# Patient Record
Sex: MALE | Race: WHITE | Employment: OTHER | URBAN - NONMETROPOLITAN AREA
[De-identification: names, ages, dates, MRNs, and addresses within clinical notes are randomized per-mention and may not be internally consistent; named-entity substitution may affect disease eponyms.]

---

## 2017-02-06 ENCOUNTER — HOSPITAL ENCOUNTER (OUTPATIENT)
Dept: NON INVASIVE DIAGNOSTICS | Age: 67
Discharge: HOME | End: 2017-02-06
Payer: MEDICARE

## 2017-02-06 DIAGNOSIS — R07.9 CHEST PAIN, UNSPECIFIED TYPE: Primary | ICD-10-CM

## 2017-02-06 PROCEDURE — 93350 STRESS TTE ONLY: CPT

## 2017-07-25 ENCOUNTER — OFFICE VISIT (OUTPATIENT)
Dept: OTOLARYNGOLOGY | Facility: CLINIC | Age: 67
End: 2017-07-25

## 2017-07-25 VITALS
WEIGHT: 153 LBS | RESPIRATION RATE: 20 BRPM | HEART RATE: 74 BPM | BODY MASS INDEX: 25.49 KG/M2 | HEIGHT: 65 IN | DIASTOLIC BLOOD PRESSURE: 76 MMHG | TEMPERATURE: 98 F | SYSTOLIC BLOOD PRESSURE: 131 MMHG

## 2017-07-25 DIAGNOSIS — R04.0 RIGHT-SIDED EPISTAXIS: Primary | ICD-10-CM

## 2017-07-25 PROCEDURE — 99203 OFFICE O/P NEW LOW 30 MIN: CPT | Performed by: NURSE PRACTITIONER

## 2017-07-25 PROCEDURE — 31231 NASAL ENDOSCOPY DX: CPT | Performed by: NURSE PRACTITIONER

## 2017-07-25 RX ORDER — LOSARTAN POTASSIUM AND HYDROCHLOROTHIAZIDE 12.5; 5 MG/1; MG/1
1 TABLET ORAL DAILY
Refills: 3 | COMMUNITY
Start: 2017-05-09

## 2017-07-25 RX ORDER — GLYCOPYRROLATE 1 MG/1
TABLET ORAL
Refills: 0 | Status: ON HOLD | COMMUNITY
Start: 2017-05-09 | End: 2019-08-29

## 2017-07-25 NOTE — PROGRESS NOTES
PRIMARY CARE PROVIDER: Oneil Awad MD  REFERRING PROVIDER: No ref. provider found    Chief Complaint   Patient presents with   • Nose Bleed       Subjective   History of Present Illness:  Laureano Saunders is a  67 y.o. male who complains of epistaxis. The symptoms are localized to the right nasal cavity. The patient has had severe symptoms. The symptoms have been intermittant for the last 5 days. He states he has had approximately 5-6 episodes of epistaxis per day lasting anywhere from 10 minutes to an hour and a half.  He states the nosebleeds primarily run down the back of his throat.  The symptoms are aggravated by blowing nose, straining, bending over, and high blood pressure. The symptoms are improved by digital pressure.  He has not been taking his Hyzaar because he felt his blood pressure has been well controlled.  He was seen by his primary care doctor today who instructed him to restart his blood pressure medications.  He is also taking aspirin for history of stroke in 2004.  I spoke with JANUSZ Torre at Dr. Awad's office prior to the patient's arrival today.  She states his hemoglobin was 14 several days ago.  She has ordered another CBC today.    Review of Systems:  Review of Systems   Constitutional: Negative for chills and fever.   HENT: Positive for nosebleeds. Negative for congestion, ear discharge, ear pain, sore throat, tinnitus and voice change.    Musculoskeletal: Positive for back pain.   Neurological: Positive for weakness.   All other systems reviewed and are negative.      Past History:  Past Medical History:   Diagnosis Date   • Allergic rhinitis    • Ankylosis    • Michel's esophagus determined by endoscopy    • Bronchitis    • CVA (cerebral vascular accident)    • DDD (degenerative disc disease), lumbar    • GERD (gastroesophageal reflux disease)    • Hypercholesteremia    • Hypertension    • Radiculopathy, lumbar region    • Scalp psoriasis    • URI (upper respiratory  infection)    • Vertigo      Past Surgical History:   Procedure Laterality Date   • COLONOSCOPY     • ENDOSCOPY     • KNEE ARTHROSCOPY      right knee x 2   • NECK SURGERY     • ROTATOR CUFF REPAIR       Family History   Problem Relation Age of Onset   • Heart disease Mother    • Hypertension Mother    • Hypertension Father    • Heart disease Father    • Cancer Father    • Hypertension Sister    • Diabetes Sister    • Hypertension Brother    • Diabetes Paternal Grandmother      Social History   Substance Use Topics   • Smoking status: Former Smoker     Packs/day: 1.00     Years: 15.00     Types: Cigarettes     Quit date: 1994   • Smokeless tobacco: None   • Alcohol use No     Allergies:  Codeine; Influenza vaccines; Lipitor [atorvastatin]; Mycinaire nasal mist [sodium chloride]; and Penicillins    Current Outpatient Prescriptions:   •  aspirin 81 MG chewable tablet, Chew 81 mg Daily., Disp: , Rfl:   •  glycopyrrolate (ROBINUL) 1 MG tablet, TAKE ONE TABLET BY MOUTH TWICE DAILY, Disp: , Rfl: 0  •  losartan-hydrochlorothiazide (HYZAAR) 50-12.5 MG per tablet, TAKE ONE TABLET BY MOUTH DAILY, Disp: , Rfl: 3  •  NIFEdipine CC (ADALAT CC) 90 MG 24 hr tablet, Take 90 mg by mouth Daily., Disp: , Rfl:   •  omeprazole (PriLOSEC) 40 MG capsule, Take 40 mg by mouth Daily., Disp: , Rfl:   •  simvastatin (ZOCOR) 40 MG tablet, Take 40 mg by mouth Every Night., Disp: , Rfl:   •  mupirocin (BACTROBAN) 2 % ointment, Apply  topically 3 (Three) Times a Day for 14 days. Apply intranasally, Disp: 22 g, Rfl: 0      Objective     Vital Signs:  Temp:  [98 °F (36.7 °C)] 98 °F (36.7 °C)  Heart Rate:  [74] 74  Resp:  [20] 20  BP: (131)/(76) 131/76    Physical Exam:  Physical Exam  CONSTITUTIONAL: well nourished, well-developed, alert, oriented, in no acute distress   COMMUNICATION AND VOICE: able to communicate normally, normal voice quality  HEAD: normocephalic, no lesions, atraumatic, no tenderness, no masses   FACE: appearance normal, no  lesions, no tenderness, no deformities, facial motion symmetric  SALIVARY GLANDS: parotid glands with no tenderness, no swelling, no masses, submandibular glands with normal size, nontender  EYES: ocular motility normal, eyelids normal, orbits normal, no proptosis, conjunctiva normal , pupils equal, round   EARS:  Hearing: response to conversational voice normal bilaterally   External Ears: auricles without lesions  Otoscopic: tympanic membrane appearance normal, no lesions, no perforation, normal mobility, no fluid  NOSE:  External Nose: structure normal, no tenderness on palpation, no nasal discharge, no lesions, no evidence of trauma, nostrils patent   Intranasal Exam: nasal mucosa normal, vestibule within normal limits, inferior turbinate normal, nasal septum with mild leftward deviation, no evidence of epistaxis  ORAL:  Lips: upper and lower lips without lesion   Teeth: dentition within normal limits for age   Gums: gingivae healthy   Oral Mucosa: oral mucosa normal, no mucosal lesions   Floor of Mouth: Warthin’s duct patent, mucosa normal  Tongue: lingual mucosa normal without lesions, normal tongue mobility   Palate: soft and hard palates with normal mucosa and structure  Oropharynx: oropharyngeal mucosa normal, tonsils normal in appearance   NECK: neck appearance normal, no masses or tenderness  LYMPH NODES: no lymphadenopathy  CHEST/RESPIRATORY: respiratory effort normal, normal breath sounds   CARDIOVASCULAR: rate and rhythm normal, extremities without cyanosis or edema    NEUROLOGIC/PSYCHIATRIC: oriented to time, place and person, mood normal, affect appropriate, CN II-XII intact grossly      Assessment   Assessment:  1. Right-sided epistaxis        Plan   Plan:    New Medications Ordered This Visit   Medications   • mupirocin (BACTROBAN) 2 % ointment     Sig: Apply  topically 3 (Three) Times a Day for 14 days. Apply intranasally     Dispense:  22 g     Refill:  0     Saline nasal spray or saline gel to  nose three times a day and as needed. Use a bedside humidifier at night. Place Vasoline in nose in the morning and at night.  Use antibiotic ointment in the front of the nose twice a day for 2 weeks. Then, switch to vasoline in the nose twice a day to keep the lining moist.  NOSEBLEED INSTRUCTIONS: Use over the counter antibiotic ointment or Vasoline to anterior nares twice a day. Salt water spray or gel to nose every 2 hours and as needed. Hypertension control is important, keeping systolic pressures less than 140 is possible. If epistaxis present, hold all ASA or NSAIDs. Use Afrin or Neosynephrine spray if epistaxis returns, Hold direct pressure to the fleshy portion of the nose for five minutes. If epistaxis continues, repeat and hold pressure for another five minutes. If bleeding continues, call the office or go to the emergency room for evaluation.   Epistaxis handout given.    Return in about 3 months (around 10/25/2017), or if symptoms worsen or fail to improve, for Recheck.    My findings and recommendations were discussed and questions were answered.     Lilia Mera, APRN  07/25/17  5:25 PM

## 2017-07-25 NOTE — PROGRESS NOTES
Procedure   Procedures     Procedure Note      Preprocedure diagnosis: Epistaxis      Postprocedure diagnosis: Same      Procedure performed: Nasal endoscopy      Anesthesia: none      Details: After patient verification and verbal consent was obtained, the zero degree rigid nasal endoscope was passed into each nostril. The following is a summary of findings:      There is mild leftward septal deviation.  There are no areas of irritation or excoriation in the anterior nasal cavities.  There are no visible areas suspicious for causing epistaxis.   The remainder of the nasal cavities are healthy. There is no evidence of recurrent epistaxis.       The scope was withdrawn without difficulty without complication

## 2017-07-31 ENCOUNTER — OFFICE VISIT (OUTPATIENT)
Dept: OTOLARYNGOLOGY | Age: 67
End: 2017-07-31
Payer: MEDICARE

## 2017-07-31 VITALS
BODY MASS INDEX: 26.49 KG/M2 | SYSTOLIC BLOOD PRESSURE: 120 MMHG | RESPIRATION RATE: 20 BRPM | DIASTOLIC BLOOD PRESSURE: 64 MMHG | OXYGEN SATURATION: 97 % | HEIGHT: 65 IN | WEIGHT: 159 LBS | HEART RATE: 75 BPM

## 2017-07-31 DIAGNOSIS — R04.0 RIGHT-SIDED EPISTAXIS: Primary | ICD-10-CM

## 2017-07-31 PROCEDURE — 31231 NASAL ENDOSCOPY DX: CPT | Performed by: OTOLARYNGOLOGY

## 2017-07-31 RX ORDER — OMEPRAZOLE 40 MG/1
CAPSULE, DELAYED RELEASE ORAL
Refills: 2 | COMMUNITY
Start: 2017-07-28

## 2017-07-31 RX ORDER — LOSARTAN POTASSIUM AND HYDROCHLOROTHIAZIDE 12.5; 5 MG/1; MG/1
TABLET ORAL
Refills: 3 | COMMUNITY
Start: 2017-05-09

## 2017-07-31 RX ORDER — SIMVASTATIN 40 MG
TABLET ORAL
Refills: 2 | COMMUNITY
Start: 2017-05-03

## 2017-07-31 RX ORDER — NIFEDIPINE 90 MG/1
90 TABLET, FILM COATED, EXTENDED RELEASE ORAL
COMMUNITY

## 2017-07-31 RX ORDER — TRIAMCINOLONE ACETONIDE 55 UG/1
2 SPRAY, METERED NASAL DAILY
Status: ON HOLD | COMMUNITY
End: 2017-09-26

## 2017-07-31 RX ORDER — ASPIRIN 81 MG/1
81 TABLET, CHEWABLE ORAL
COMMUNITY

## 2017-09-26 ENCOUNTER — APPOINTMENT (OUTPATIENT)
Dept: CT IMAGING | Age: 67
End: 2017-09-26
Attending: FAMILY MEDICINE
Payer: MEDICARE

## 2017-09-26 ENCOUNTER — HOSPITAL ENCOUNTER (OUTPATIENT)
Age: 67
Setting detail: OBSERVATION
Discharge: HOME | End: 2017-09-27
Attending: FAMILY MEDICINE | Admitting: FAMILY MEDICINE
Payer: MEDICARE

## 2017-09-26 ENCOUNTER — APPOINTMENT (OUTPATIENT)
Dept: GENERAL RADIOLOGY | Age: 67
End: 2017-09-26
Attending: FAMILY MEDICINE
Payer: MEDICARE

## 2017-09-26 LAB
ALBUMIN SERPL-MCNC: 3.6 G/DL (ref 3.5–5.2)
ALP BLD-CCNC: 63 U/L (ref 40–130)
ALT SERPL-CCNC: 35 U/L (ref 5–41)
AMYLASE: 77 U/L (ref 28–100)
ANION GAP SERPL CALCULATED.3IONS-SCNC: 12 MMOL/L (ref 7–19)
AST SERPL-CCNC: 39 U/L (ref 5–40)
BACTERIA: NEGATIVE /HPF
BASOPHILS ABSOLUTE: 0 K/UL (ref 0–0.2)
BASOPHILS RELATIVE PERCENT: 0.3 % (ref 0–1)
BILIRUB SERPL-MCNC: 0.6 MG/DL (ref 0.2–1.2)
BILIRUBIN URINE: NEGATIVE
BLOOD, URINE: ABNORMAL
BUN BLDV-MCNC: 25 MG/DL (ref 8–23)
CALCIUM SERPL-MCNC: 8.2 MG/DL (ref 8.8–10.2)
CHLORIDE BLD-SCNC: 95 MMOL/L (ref 98–111)
CLARITY: CLEAR
CO2: 29 MMOL/L (ref 22–29)
COLOR: YELLOW
CREAT SERPL-MCNC: 2 MG/DL (ref 0.5–1.2)
EOSINOPHILS ABSOLUTE: 0 K/UL (ref 0–0.6)
EOSINOPHILS RELATIVE PERCENT: 0 % (ref 0–5)
EPITHELIAL CELLS, UA: 0 /HPF (ref 0–5)
GFR NON-AFRICAN AMERICAN: 34
GLUCOSE BLD-MCNC: 118 MG/DL (ref 74–109)
GLUCOSE URINE: NEGATIVE MG/DL
HCT VFR BLD CALC: 37.6 % (ref 42–52)
HEMOGLOBIN: 12.8 G/DL (ref 14–18)
HYALINE CASTS: 2 /HPF (ref 0–8)
INR BLD: 1.13 (ref 0.88–1.18)
KETONES, URINE: NEGATIVE MG/DL
LEUKOCYTE ESTERASE, URINE: NEGATIVE
LIPASE: 55 U/L (ref 13–60)
LYMPHOCYTES ABSOLUTE: 0.2 K/UL (ref 1.1–4.5)
LYMPHOCYTES RELATIVE PERCENT: 5 % (ref 20–40)
MCH RBC QN AUTO: 29.9 PG (ref 27–31)
MCHC RBC AUTO-ENTMCNC: 34 G/DL (ref 33–37)
MCV RBC AUTO: 87.9 FL (ref 80–94)
MONOCYTES ABSOLUTE: 0.3 K/UL (ref 0–0.9)
MONOCYTES RELATIVE PERCENT: 8.1 % (ref 0–10)
NEUTROPHILS ABSOLUTE: 3.1 K/UL (ref 1.5–7.5)
NEUTROPHILS RELATIVE PERCENT: 86 % (ref 50–65)
NITRITE, URINE: NEGATIVE
PDW BLD-RTO: 13 % (ref 11.5–14.5)
PH UA: 6
PLATELET # BLD: 102 K/UL (ref 130–400)
PMV BLD AUTO: 9.9 FL (ref 9.4–12.4)
POTASSIUM SERPL-SCNC: 3 MMOL/L (ref 3.5–5)
PROTEIN UA: ABNORMAL MG/DL
PROTHROMBIN TIME: 14.4 SEC (ref 12–14.6)
RBC # BLD: 4.28 M/UL (ref 4.7–6.1)
RBC UA: 1 /HPF (ref 0–4)
SODIUM BLD-SCNC: 136 MMOL/L (ref 136–145)
SPECIFIC GRAVITY UA: 1.01
TOTAL PROTEIN: 6.3 G/DL (ref 6.6–8.7)
UROBILINOGEN, URINE: 0.2 E.U./DL
WBC # BLD: 3.6 K/UL (ref 4.8–10.8)
WBC UA: 0 /HPF (ref 0–5)

## 2017-09-26 PROCEDURE — 96372 THER/PROPH/DIAG INJ SC/IM: CPT

## 2017-09-26 PROCEDURE — 80053 COMPREHEN METABOLIC PANEL: CPT

## 2017-09-26 PROCEDURE — 87040 BLOOD CULTURE FOR BACTERIA: CPT

## 2017-09-26 PROCEDURE — 6360000002 HC RX W HCPCS: Performed by: FAMILY MEDICINE

## 2017-09-26 PROCEDURE — G0378 HOSPITAL OBSERVATION PER HR: HCPCS

## 2017-09-26 PROCEDURE — 85610 PROTHROMBIN TIME: CPT

## 2017-09-26 PROCEDURE — 96365 THER/PROPH/DIAG IV INF INIT: CPT

## 2017-09-26 PROCEDURE — 6370000000 HC RX 637 (ALT 250 FOR IP): Performed by: FAMILY MEDICINE

## 2017-09-26 PROCEDURE — 83690 ASSAY OF LIPASE: CPT

## 2017-09-26 PROCEDURE — 36415 COLL VENOUS BLD VENIPUNCTURE: CPT

## 2017-09-26 PROCEDURE — 82150 ASSAY OF AMYLASE: CPT

## 2017-09-26 PROCEDURE — 85025 COMPLETE CBC W/AUTO DIFF WBC: CPT

## 2017-09-26 PROCEDURE — 81001 URINALYSIS AUTO W/SCOPE: CPT

## 2017-09-26 PROCEDURE — 2580000003 HC RX 258: Performed by: FAMILY MEDICINE

## 2017-09-26 PROCEDURE — 74176 CT ABD & PELVIS W/O CONTRAST: CPT

## 2017-09-26 PROCEDURE — 71020 XR CHEST STANDARD TWO VW: CPT

## 2017-09-26 RX ORDER — ONDANSETRON 2 MG/ML
4 INJECTION INTRAMUSCULAR; INTRAVENOUS EVERY 6 HOURS PRN
Status: DISCONTINUED | OUTPATIENT
Start: 2017-09-26 | End: 2017-09-27 | Stop reason: HOSPADM

## 2017-09-26 RX ORDER — POTASSIUM CHLORIDE 20 MEQ/1
40 TABLET, EXTENDED RELEASE ORAL PRN
Status: DISCONTINUED | OUTPATIENT
Start: 2017-09-26 | End: 2017-09-27 | Stop reason: HOSPADM

## 2017-09-26 RX ORDER — SODIUM CHLORIDE 0.9 % (FLUSH) 0.9 %
10 SYRINGE (ML) INJECTION PRN
Status: DISCONTINUED | OUTPATIENT
Start: 2017-09-26 | End: 2017-09-27 | Stop reason: HOSPADM

## 2017-09-26 RX ORDER — POTASSIUM CHLORIDE 20MEQ/15ML
40 LIQUID (ML) ORAL PRN
Status: DISCONTINUED | OUTPATIENT
Start: 2017-09-26 | End: 2017-09-27 | Stop reason: HOSPADM

## 2017-09-26 RX ORDER — ACETAMINOPHEN 325 MG/1
650 TABLET ORAL EVERY 4 HOURS PRN
Status: DISCONTINUED | OUTPATIENT
Start: 2017-09-26 | End: 2017-09-27 | Stop reason: HOSPADM

## 2017-09-26 RX ORDER — LEVOFLOXACIN 5 MG/ML
500 INJECTION, SOLUTION INTRAVENOUS EVERY 24 HOURS
Status: DISCONTINUED | OUTPATIENT
Start: 2017-09-26 | End: 2017-09-27 | Stop reason: HOSPADM

## 2017-09-26 RX ORDER — POTASSIUM CHLORIDE 7.45 MG/ML
10 INJECTION INTRAVENOUS PRN
Status: DISCONTINUED | OUTPATIENT
Start: 2017-09-26 | End: 2017-09-27 | Stop reason: HOSPADM

## 2017-09-26 RX ORDER — BISACODYL 10 MG
10 SUPPOSITORY, RECTAL RECTAL DAILY PRN
Status: DISCONTINUED | OUTPATIENT
Start: 2017-09-26 | End: 2017-09-27 | Stop reason: HOSPADM

## 2017-09-26 RX ORDER — SODIUM CHLORIDE 0.9 % (FLUSH) 0.9 %
10 SYRINGE (ML) INJECTION EVERY 12 HOURS SCHEDULED
Status: DISCONTINUED | OUTPATIENT
Start: 2017-09-26 | End: 2017-09-27 | Stop reason: HOSPADM

## 2017-09-26 RX ORDER — SODIUM CHLORIDE 9 MG/ML
INJECTION, SOLUTION INTRAVENOUS CONTINUOUS
Status: DISCONTINUED | OUTPATIENT
Start: 2017-09-26 | End: 2017-09-27 | Stop reason: HOSPADM

## 2017-09-26 RX ORDER — MAGNESIUM SULFATE 1 G/100ML
1 INJECTION INTRAVENOUS PRN
Status: DISCONTINUED | OUTPATIENT
Start: 2017-09-26 | End: 2017-09-27 | Stop reason: HOSPADM

## 2017-09-26 RX ADMIN — LEVOFLOXACIN 500 MG: 5 INJECTION, SOLUTION INTRAVENOUS at 15:39

## 2017-09-26 RX ADMIN — SODIUM CHLORIDE: 9 INJECTION, SOLUTION INTRAVENOUS at 15:35

## 2017-09-26 RX ADMIN — ENOXAPARIN SODIUM 40 MG: 40 INJECTION SUBCUTANEOUS at 15:39

## 2017-09-26 RX ADMIN — ACETAMINOPHEN 650 MG: 325 TABLET, FILM COATED ORAL at 14:56

## 2017-09-27 VITALS
HEART RATE: 76 BPM | HEIGHT: 65 IN | RESPIRATION RATE: 17 BRPM | DIASTOLIC BLOOD PRESSURE: 66 MMHG | OXYGEN SATURATION: 97 % | SYSTOLIC BLOOD PRESSURE: 108 MMHG | WEIGHT: 159.2 LBS | BODY MASS INDEX: 26.52 KG/M2 | TEMPERATURE: 98.7 F

## 2017-09-27 PROCEDURE — 96372 THER/PROPH/DIAG INJ SC/IM: CPT

## 2017-09-27 PROCEDURE — 96366 THER/PROPH/DIAG IV INF ADDON: CPT

## 2017-09-27 PROCEDURE — 6360000002 HC RX W HCPCS: Performed by: FAMILY MEDICINE

## 2017-09-27 PROCEDURE — 2580000003 HC RX 258: Performed by: FAMILY MEDICINE

## 2017-09-27 PROCEDURE — G8989 SELF CARE D/C STATUS: HCPCS

## 2017-09-27 PROCEDURE — G8988 SELF CARE GOAL STATUS: HCPCS

## 2017-09-27 PROCEDURE — 6370000000 HC RX 637 (ALT 250 FOR IP): Performed by: FAMILY MEDICINE

## 2017-09-27 PROCEDURE — G8987 SELF CARE CURRENT STATUS: HCPCS

## 2017-09-27 PROCEDURE — G0378 HOSPITAL OBSERVATION PER HR: HCPCS

## 2017-09-27 RX ADMIN — LEVOFLOXACIN 500 MG: 5 INJECTION, SOLUTION INTRAVENOUS at 14:59

## 2017-09-27 RX ADMIN — ACETAMINOPHEN 650 MG: 325 TABLET, FILM COATED ORAL at 04:39

## 2017-09-27 RX ADMIN — ENOXAPARIN SODIUM 40 MG: 40 INJECTION SUBCUTANEOUS at 14:59

## 2017-09-27 RX ADMIN — ACETAMINOPHEN 650 MG: 325 TABLET, FILM COATED ORAL at 16:28

## 2017-09-27 RX ADMIN — POTASSIUM CHLORIDE 40 MEQ: 20 TABLET, EXTENDED RELEASE ORAL at 08:08

## 2017-09-27 RX ADMIN — Medication 10 ML: at 10:16

## 2017-09-27 ASSESSMENT — ENCOUNTER SYMPTOMS
RESPIRATORY NEGATIVE: 1
NAUSEA: 1
COLOR CHANGE: 0
ABDOMINAL PAIN: 1
DIARRHEA: 1
CONSTIPATION: 0
ALLERGIC/IMMUNOLOGIC NEGATIVE: 1
RECTAL PAIN: 0
EYES NEGATIVE: 1
BLOOD IN STOOL: 0
ABDOMINAL DISTENTION: 1
VOMITING: 0

## 2017-09-27 ASSESSMENT — PAIN SCALES - GENERAL
PAINLEVEL_OUTOF10: 0
PAINLEVEL_OUTOF10: 5

## 2017-10-01 LAB
BLOOD CULTURE, ROUTINE: NORMAL
BLOOD CULTURE, ROUTINE: NORMAL

## 2019-08-16 ENCOUNTER — OFFICE VISIT (OUTPATIENT)
Dept: GASTROENTEROLOGY | Facility: CLINIC | Age: 69
End: 2019-08-16

## 2019-08-16 VITALS
OXYGEN SATURATION: 99 % | BODY MASS INDEX: 25.83 KG/M2 | DIASTOLIC BLOOD PRESSURE: 80 MMHG | WEIGHT: 155 LBS | SYSTOLIC BLOOD PRESSURE: 138 MMHG | TEMPERATURE: 98.5 F | HEART RATE: 77 BPM | HEIGHT: 65 IN

## 2019-08-16 DIAGNOSIS — Z80.0 FAMILY HISTORY OF COLON CANCER: ICD-10-CM

## 2019-08-16 DIAGNOSIS — K22.70 BARRETT'S ESOPHAGUS DETERMINED BY BIOPSY: ICD-10-CM

## 2019-08-16 DIAGNOSIS — K21.9 GASTROESOPHAGEAL REFLUX DISEASE, ESOPHAGITIS PRESENCE NOT SPECIFIED: Primary | ICD-10-CM

## 2019-08-16 PROCEDURE — 99213 OFFICE O/P EST LOW 20 MIN: CPT | Performed by: NURSE PRACTITIONER

## 2019-08-16 NOTE — H&P (VIEW-ONLY)
Chief Complaint   Patient presents with   • Colon Cancer Screening     colon-9/1/11 normal       PCP: Oneil Awad MD  REFER: Oneil Awad MD    Subjective     HPI    Laureano Saunders is a 69 y.o. male who presents to office for preventative maintenance.  There is not  a personal history of colon polyps.  There is not a history of colon cancer.  He does not have complaints of nausea/vomiting, change in bowels, weight loss, no BRBPR, no melena.  There is not a family history of colon cancer.  There is not a family history of colon polyps.  He last colonoscopy-2011 .  Bowels do move on regular basis.    History of Dos Santos's Esophagus determined by biopsy over 6 years ago. Coarse is constant. Long history of GERD related symptoms.  Currently maintained on Omeprazole daily  Denies heartburn, no indigestion, no dysphagia.  EGD 2015 reviewed.    Endoscopy (Dr Gruber) 2015-stable Dos Santos's Esophagus, negative biopsy   CScope (Dr Gruber) 2011-normal       Past Medical History:   Diagnosis Date   • Allergic rhinitis    • Ankylosis    • Dos Santos's esophagus determined by endoscopy    • Bronchitis    • CVA (cerebral vascular accident) (CMS/HCC)    • DDD (degenerative disc disease), lumbar    • GERD (gastroesophageal reflux disease)    • Hypercholesteremia    • Hypertension    • Radiculopathy, lumbar region    • Scalp psoriasis    • URI (upper respiratory infection)    • Vertigo      Past Surgical History:   Procedure Laterality Date   • COLONOSCOPY  09/01/2011    normal   • ENDOSCOPY  06/19/2015    stable dos santos esophagus   • KNEE ARTHROSCOPY      right knee x 2   • NECK SURGERY     • ROTATOR CUFF REPAIR       Outpatient Medications Marked as Taking for the 8/16/19 encounter (Office Visit) with Zoran Perez APRN   Medication Sig Dispense Refill   • aspirin 81 MG chewable tablet Chew 81 mg Daily.     • losartan-hydrochlorothiazide (HYZAAR) 50-12.5 MG per tablet TAKE ONE TABLET BY MOUTH DAILY  3   • NIFEdipine  CC (ADALAT CC) 90 MG 24 hr tablet Take 90 mg by mouth Daily.     • omeprazole (PriLOSEC) 40 MG capsule Take 40 mg by mouth Daily.     • simvastatin (ZOCOR) 40 MG tablet Take 40 mg by mouth Every Night.       Allergies   Allergen Reactions   • Codeine    • Influenza Vaccines    • Lipitor [Atorvastatin] Other (See Comments)   • Mycinaire Nasal Mist [Sodium Chloride]    • Penicillins Hives   • Terramycin [Oxytetracycline] Hives     Social History     Socioeconomic History   • Marital status:      Spouse name: Not on file   • Number of children: Not on file   • Years of education: Not on file   • Highest education level: Not on file   Tobacco Use   • Smoking status: Former Smoker     Packs/day: 1.00     Years: 15.00     Pack years: 15.00     Types: Cigarettes     Last attempt to quit:      Years since quittin.6   • Smokeless tobacco: Current User     Types: Chew   Substance and Sexual Activity   • Alcohol use: No   • Drug use: No   • Sexual activity: Defer     Family History   Problem Relation Age of Onset   • Heart disease Mother    • Hypertension Mother    • Hypertension Father    • Heart disease Father    • Cancer Father    • Hypertension Sister    • Diabetes Sister    • Hypertension Brother    • Diabetes Paternal Grandmother      Review of Systems   Constitutional: Negative for fatigue, fever and unexpected weight change.   HENT: Negative for hearing loss, sore throat and voice change.    Eyes: Negative for visual disturbance.   Respiratory: Negative for cough, shortness of breath and wheezing.    Cardiovascular: Negative for chest pain and palpitations.   Gastrointestinal: Negative for abdominal pain, blood in stool and vomiting.   Endocrine: Negative for polydipsia and polyuria.   Genitourinary: Negative for difficulty urinating, dysuria, hematuria and urgency.   Musculoskeletal: Negative for joint swelling and myalgias.   Skin: Negative for color change, rash and wound.   Neurological: Negative  for dizziness, tremors, seizures and syncope.   Hematological: Does not bruise/bleed easily.   Psychiatric/Behavioral: Negative for agitation and confusion. The patient is not nervous/anxious.      Objective   Vitals:    08/16/19 0938   BP: 138/80   Pulse: 77   Temp: 98.5 °F (36.9 °C)   SpO2: 99%     Physical Exam   Constitutional: He is oriented to person, place, and time. He appears well-developed and well-nourished. He is cooperative.   HENT:   Head: Normocephalic and atraumatic.   Eyes: Conjunctivae are normal. Pupils are equal, round, and reactive to light. No scleral icterus.   Neck: Normal range of motion. Neck supple. No JVD present. No thyroid mass and no thyromegaly present.   Cardiovascular: Normal rate, regular rhythm and normal heart sounds. Exam reveals no gallop and no friction rub.   No murmur heard.  Pulmonary/Chest: Effort normal and breath sounds normal. No accessory muscle usage. No respiratory distress. He has no wheezes. He has no rales.   Abdominal: Soft. Normal appearance and bowel sounds are normal. He exhibits no distension, no ascites and no mass. There is no hepatosplenomegaly. There is no tenderness. There is no rebound and no guarding.   Musculoskeletal: Normal range of motion. He exhibits no edema or tenderness.     Vascular Status -  His right foot exhibits normal foot vasculature  and no edema. His left foot exhibits normal foot vasculature  and no edema.  Lymphadenopathy:     He has no cervical adenopathy.   Neurological: He is alert and oriented to person, place, and time. He has normal strength. Gait normal.   Skin: Skin is warm, dry and intact. No rash noted.     Imaging Results (most recent)     None        Body mass index is 25.79 kg/m².    Assessment/Plan   Laureano was seen today for colon cancer screening.    Diagnoses and all orders for this visit:    Gastroesophageal reflux disease, esophagitis presence not specified    Michel's esophagus determined by biopsy  -     Case  Request; Standing  -     Implement Anesthesia Orders Day of Procedure; Standing  -     Obtain Informed Consent; Standing  -     Case Request    Family history of colon cancer  -     Case Request; Standing  -     Implement Anesthesia Orders Day of Procedure; Standing  -     Obtain Informed Consent; Standing  -     Case Request    Other orders  -     Cancel: polyethylene glycol (GoLYTELY) 236 g solution; Take 3,785 mL by mouth 1 (One) Time for 1 dose. Take as directed      ESOPHAGOGASTRODUODENOSCOPY WITH ANESTHESIA (N/A)   2. COLONOSCOPY WITH ANESTHESIA  clenpiq    Take PPI 30 min prior to breakfast     All risks, benefits, alternatives, and indications of colonoscopy procedure have been discussed with the patient. Risks to include perforation of the colon requiring possible surgery or colostomy, risk of bleeding from biopsies or removal of colon tissue, possibility of missing a colon polyp or cancer, or adverse drug reaction.  Benefits to include the diagnosis and management of disease of the colon and rectum. Alternatives to include barium enema, radiographic evaluation, lab testing or no intervention. He verbalizes understanding and agrees.     Patient's Body mass index is 25.79 kg/m². BMI is above normal parameters. Recommendations include: no follow up.      There are no Patient Instructions on file for this visit.

## 2019-08-27 ENCOUNTER — ANESTHESIA EVENT (OUTPATIENT)
Dept: GASTROENTEROLOGY | Facility: HOSPITAL | Age: 69
End: 2019-08-27

## 2019-08-27 ENCOUNTER — TELEPHONE (OUTPATIENT)
Dept: GASTROENTEROLOGY | Facility: CLINIC | Age: 69
End: 2019-08-27

## 2019-08-27 ENCOUNTER — ANESTHESIA (OUTPATIENT)
Dept: GASTROENTEROLOGY | Facility: HOSPITAL | Age: 69
End: 2019-08-27

## 2019-08-27 ENCOUNTER — HOSPITAL ENCOUNTER (OUTPATIENT)
Facility: HOSPITAL | Age: 69
Setting detail: HOSPITAL OUTPATIENT SURGERY
Discharge: HOME | End: 2019-08-27
Attending: INTERNAL MEDICINE | Admitting: INTERNAL MEDICINE

## 2019-08-27 VITALS
HEART RATE: 68 BPM | HEIGHT: 65 IN | BODY MASS INDEX: 25.99 KG/M2 | SYSTOLIC BLOOD PRESSURE: 130 MMHG | WEIGHT: 156 LBS | RESPIRATION RATE: 16 BRPM | TEMPERATURE: 97.8 F | DIASTOLIC BLOOD PRESSURE: 90 MMHG | OXYGEN SATURATION: 96 %

## 2019-08-27 DIAGNOSIS — Z80.0 FAMILY HISTORY OF COLON CANCER: ICD-10-CM

## 2019-08-27 PROCEDURE — 25010000002 PROPOFOL 10 MG/ML EMULSION: Performed by: NURSE ANESTHETIST, CERTIFIED REGISTERED

## 2019-08-27 PROCEDURE — 45385 COLONOSCOPY W/LESION REMOVAL: CPT | Performed by: INTERNAL MEDICINE

## 2019-08-27 PROCEDURE — 88305 TISSUE EXAM BY PATHOLOGIST: CPT | Performed by: INTERNAL MEDICINE

## 2019-08-27 RX ORDER — SODIUM CHLORIDE 0.9 % (FLUSH) 0.9 %
10 SYRINGE (ML) INJECTION AS NEEDED
Status: DISCONTINUED | OUTPATIENT
Start: 2019-08-27 | End: 2019-08-27 | Stop reason: HOSPADM

## 2019-08-27 RX ORDER — SODIUM CHLORIDE 9 MG/ML
500 INJECTION, SOLUTION INTRAVENOUS CONTINUOUS PRN
Status: DISCONTINUED | OUTPATIENT
Start: 2019-08-27 | End: 2019-08-27 | Stop reason: HOSPADM

## 2019-08-27 RX ORDER — PROPOFOL 10 MG/ML
VIAL (ML) INTRAVENOUS AS NEEDED
Status: DISCONTINUED | OUTPATIENT
Start: 2019-08-27 | End: 2019-08-27 | Stop reason: SURG

## 2019-08-27 RX ORDER — LIDOCAINE HYDROCHLORIDE 20 MG/ML
INJECTION, SOLUTION INFILTRATION; PERINEURAL AS NEEDED
Status: DISCONTINUED | OUTPATIENT
Start: 2019-08-27 | End: 2019-08-27 | Stop reason: SURG

## 2019-08-27 RX ADMIN — PROPOFOL 160 MG: 10 INJECTION, EMULSION INTRAVENOUS at 08:24

## 2019-08-27 RX ADMIN — SODIUM CHLORIDE 500 ML: 9 INJECTION, SOLUTION INTRAVENOUS at 07:26

## 2019-08-27 RX ADMIN — LIDOCAINE HYDROCHLORIDE 50 MG: 20 INJECTION, SOLUTION INFILTRATION; PERINEURAL at 08:24

## 2019-08-27 NOTE — ANESTHESIA POSTPROCEDURE EVALUATION
"Patient: Laureano Lai    Procedure Summary     Date:  08/27/19 Room / Location:  [facility] ENDOSCOPY 6 / [facility]    Anesthesia Start:  0820 Anesthesia Stop:  0836    Procedure:  COLONOSCOPY WITH ANESTHESIA (N/A ) Diagnosis:       Family history of colon cancer      (Family history of colon cancer [Z80.0])    Surgeon:  Reuben Gruber DO Provider:  Alejandra Long CRNA    Anesthesia Type:  MAC ASA Status:  2          Anesthesia Type: MAC  Last vitals  BP   138/84 (08/27/19 0840)   Temp   97.8 °F (36.6 °C) (08/27/19 0702)   Pulse   69 (08/27/19 0840)   Resp   16 (08/27/19 0840)     SpO2   96 % (08/27/19 0840)     Post Anesthesia Care and Evaluation    Patient location during evaluation: PACU  Patient participation: complete - patient participated  Level of consciousness: awake and alert  Pain management: adequate  Airway patency: patent  Anesthetic complications: No anesthetic complications    Cardiovascular status: acceptable  Respiratory status: acceptable  Hydration status: acceptable    Comments: Blood pressure 138/84, pulse 69, temperature 97.8 °F (36.6 °C), temperature source Temporal, resp. rate 16, height 165.1 cm (65\"), weight 70.8 kg (156 lb), SpO2 96 %.    Pt discharged from PACU based on michelle score >8      "

## 2019-08-27 NOTE — ANESTHESIA PREPROCEDURE EVALUATION
Anesthesia Evaluation     Patient summary reviewed   history of anesthetic complications: PONV  NPO Solid Status: > 8 hours             Airway   Mallampati: II  TM distance: >3 FB  Neck ROM: full  Dental      Pulmonary - negative pulmonary ROS   Cardiovascular   Exercise tolerance: excellent (>7 METS)    (+) hypertension, hyperlipidemia,       Neuro/Psych  (+) CVA,     GI/Hepatic/Renal/Endo    (+)  GERD,      Musculoskeletal     Abdominal    Substance History      OB/GYN          Other                        Anesthesia Plan    ASA 2     MAC     Anesthetic plan, all risks, benefits, and alternatives have been provided, discussed and informed consent has been obtained with: patient.

## 2019-08-28 LAB
CYTO UR: NORMAL
LAB AP CASE REPORT: NORMAL
LAB AP CLINICAL INFORMATION: NORMAL
PATH REPORT.FINAL DX SPEC: NORMAL
PATH REPORT.GROSS SPEC: NORMAL

## 2019-08-29 ENCOUNTER — ANESTHESIA EVENT (OUTPATIENT)
Dept: GASTROENTEROLOGY | Facility: HOSPITAL | Age: 69
End: 2019-08-29

## 2019-08-29 ENCOUNTER — ANESTHESIA (OUTPATIENT)
Dept: GASTROENTEROLOGY | Facility: HOSPITAL | Age: 69
End: 2019-08-29

## 2019-08-29 ENCOUNTER — TELEPHONE (OUTPATIENT)
Dept: GASTROENTEROLOGY | Facility: CLINIC | Age: 69
End: 2019-08-29

## 2019-08-29 ENCOUNTER — HOSPITAL ENCOUNTER (OUTPATIENT)
Facility: HOSPITAL | Age: 69
Setting detail: HOSPITAL OUTPATIENT SURGERY
Discharge: HOME | End: 2019-08-29
Attending: INTERNAL MEDICINE | Admitting: INTERNAL MEDICINE

## 2019-08-29 VITALS
BODY MASS INDEX: 25.99 KG/M2 | HEART RATE: 67 BPM | TEMPERATURE: 98.1 F | DIASTOLIC BLOOD PRESSURE: 74 MMHG | HEIGHT: 65 IN | SYSTOLIC BLOOD PRESSURE: 128 MMHG | RESPIRATION RATE: 18 BRPM | WEIGHT: 156 LBS | OXYGEN SATURATION: 96 %

## 2019-08-29 DIAGNOSIS — K22.70 BARRETT'S ESOPHAGUS DETERMINED BY BIOPSY: ICD-10-CM

## 2019-08-29 PROCEDURE — 43239 EGD BIOPSY SINGLE/MULTIPLE: CPT | Performed by: INTERNAL MEDICINE

## 2019-08-29 PROCEDURE — 88305 TISSUE EXAM BY PATHOLOGIST: CPT | Performed by: INTERNAL MEDICINE

## 2019-08-29 PROCEDURE — 25010000002 PROPOFOL 10 MG/ML EMULSION: Performed by: NURSE ANESTHETIST, CERTIFIED REGISTERED

## 2019-08-29 RX ORDER — SODIUM CHLORIDE 0.9 % (FLUSH) 0.9 %
3 SYRINGE (ML) INJECTION EVERY 12 HOURS SCHEDULED
Status: CANCELLED | OUTPATIENT
Start: 2019-08-29

## 2019-08-29 RX ORDER — SODIUM CHLORIDE 9 MG/ML
500 INJECTION, SOLUTION INTRAVENOUS CONTINUOUS PRN
Status: DISCONTINUED | OUTPATIENT
Start: 2019-08-29 | End: 2019-08-29 | Stop reason: HOSPADM

## 2019-08-29 RX ORDER — PROPOFOL 10 MG/ML
VIAL (ML) INTRAVENOUS AS NEEDED
Status: DISCONTINUED | OUTPATIENT
Start: 2019-08-29 | End: 2019-08-29 | Stop reason: SURG

## 2019-08-29 RX ORDER — SODIUM CHLORIDE 0.9 % (FLUSH) 0.9 %
10 SYRINGE (ML) INJECTION AS NEEDED
Status: DISCONTINUED | OUTPATIENT
Start: 2019-08-29 | End: 2019-08-29 | Stop reason: HOSPADM

## 2019-08-29 RX ORDER — LIDOCAINE HYDROCHLORIDE 20 MG/ML
INJECTION, SOLUTION INFILTRATION; PERINEURAL AS NEEDED
Status: DISCONTINUED | OUTPATIENT
Start: 2019-08-29 | End: 2019-08-29 | Stop reason: SURG

## 2019-08-29 RX ORDER — SODIUM CHLORIDE 0.9 % (FLUSH) 0.9 %
3-10 SYRINGE (ML) INJECTION AS NEEDED
Status: CANCELLED | OUTPATIENT
Start: 2019-08-29

## 2019-08-29 RX ORDER — SODIUM CHLORIDE 9 MG/ML
100 INJECTION, SOLUTION INTRAVENOUS CONTINUOUS
Status: CANCELLED | OUTPATIENT
Start: 2019-08-29

## 2019-08-29 RX ADMIN — LIDOCAINE HYDROCHLORIDE 100 MG: 20 INJECTION, SOLUTION INFILTRATION; PERINEURAL at 08:50

## 2019-08-29 RX ADMIN — SODIUM CHLORIDE 500 ML: 9 INJECTION, SOLUTION INTRAVENOUS at 07:41

## 2019-08-29 RX ADMIN — PROPOFOL 100 MG: 10 INJECTION, EMULSION INTRAVENOUS at 08:50

## 2019-08-29 NOTE — ANESTHESIA POSTPROCEDURE EVALUATION
Patient: Laureano Lai    Procedure Summary     Date:  08/29/19 Room / Location:  [facility] ENDOSCOPY 5 / [facility]    Anesthesia Start:  0849 Anesthesia Stop:  0858    Procedure:  ESOPHAGOGASTRODUODENOSCOPY WITH ANESTHESIA (N/A ) Diagnosis:       Michel's esophagus determined by biopsy      (Michel's esophagus determined by biopsy [K22.70])    Surgeon:  Reuben Gruber DO Provider:  Oleg Collazo MD    Anesthesia Type:  MAC ASA Status:  3          Anesthesia Type: MAC  Last vitals  BP   128/74 (08/29/19 0914)   Temp   98.1 °F (36.7 °C) (08/29/19 0720)   Pulse   67 (08/29/19 0914)   Resp   18 (08/29/19 0914)     SpO2   96 % (08/29/19 0914)     Post Anesthesia Care and Evaluation    Patient location during evaluation: PHASE II  Patient participation: complete - patient participated  Level of consciousness: awake and alert  Pain score: 0  Pain management: adequate  Airway patency: patent  Anesthetic complications: No anesthetic complications  PONV Status: none  Cardiovascular status: acceptable  Respiratory status: acceptable  Hydration status: acceptable  No anesthesia care post op

## 2019-08-29 NOTE — ANESTHESIA PREPROCEDURE EVALUATION
Anesthesia Evaluation     history of anesthetic complications: PONV  NPO Solid Status: > 8 hours  NPO Liquid Status: > 2 hours           Airway   Mallampati: II  TM distance: >3 FB  Neck ROM: full  Dental          Pulmonary - normal exam    breath sounds clear to auscultation  (-) asthma, recent URI, sleep apnea, not a smoker  Cardiovascular - normal exam  Exercise tolerance: good (4-7 METS)    Rhythm: regular  Rate: normal    (+) hypertension, hyperlipidemia,   (-) pacemaker, past MI, angina, cardiac stents, CABG      Neuro/Psych  (+) CVA (2004, no residual symptoms),     (-) seizures, TIA  GI/Hepatic/Renal/Endo    (+)  GERD,    (-) liver disease, no renal disease, diabetes, hypothyroidism, hyperthyroidism    ROS Comment: Michel's Esophagus    Musculoskeletal     Abdominal    Substance History      OB/GYN          Other                        Anesthesia Plan    ASA 3     MAC     intravenous induction   Anesthetic plan, all risks, benefits, and alternatives have been provided, discussed and informed consent has been obtained with: patient.

## 2019-08-30 LAB
CYTO UR: NORMAL
LAB AP CASE REPORT: NORMAL
PATH REPORT.FINAL DX SPEC: NORMAL
PATH REPORT.GROSS SPEC: NORMAL

## 2019-09-05 ENCOUNTER — TELEPHONE (OUTPATIENT)
Dept: GASTROENTEROLOGY | Facility: CLINIC | Age: 69
End: 2019-09-05

## 2019-09-05 NOTE — TELEPHONE ENCOUNTER
----- Message from Reuben Gruber DO sent at 8/30/2019  4:16 PM CDT -----  Regarding: esoph bx  Can u let him know his esoph bx were stable  egd in  3yrs      ----- Message -----  From: Lab, Background User  Sent: 8/30/2019   3:54 PM  To: Reuben Gruber DO      patient wanted to find out his results of his colon.

## 2019-09-05 NOTE — TELEPHONE ENCOUNTER
----- Message from Reuben Gruber DO sent at 8/30/2019  4:16 PM CDT -----  Regarding: esoph bx  Can u let him know his esoph bx were stable  egd in  3yrs      ----- Message -----  From: Lab, Background User  Sent: 8/30/2019   3:54 PM  To: Reuben Gruber DO      Called patient gave results.  3 year endo in chart

## 2020-06-12 ENCOUNTER — OFFICE VISIT (OUTPATIENT)
Dept: OTOLARYNGOLOGY | Facility: CLINIC | Age: 70
End: 2020-06-12

## 2020-06-12 VITALS
SYSTOLIC BLOOD PRESSURE: 155 MMHG | BODY MASS INDEX: 26.62 KG/M2 | DIASTOLIC BLOOD PRESSURE: 94 MMHG | HEIGHT: 65 IN | HEART RATE: 93 BPM | TEMPERATURE: 97.9 F | WEIGHT: 159.8 LBS

## 2020-06-12 DIAGNOSIS — H90.A21 SENSORINEURAL HEARING LOSS (SNHL) OF RIGHT EAR WITH RESTRICTED HEARING OF LEFT EAR: Primary | ICD-10-CM

## 2020-06-12 DIAGNOSIS — H93.13 TINNITUS OF BOTH EARS: ICD-10-CM

## 2020-06-12 DIAGNOSIS — H90.A32 MIXED CONDUCTIVE AND SENSORINEURAL HEARING LOSS OF LEFT EAR WITH RESTRICTED HEARING OF RIGHT EAR: ICD-10-CM

## 2020-06-12 DIAGNOSIS — H61.22 IMPACTED CERUMEN OF LEFT EAR: ICD-10-CM

## 2020-06-12 PROCEDURE — 69210 REMOVE IMPACTED EAR WAX UNI: CPT | Performed by: PHYSICIAN ASSISTANT

## 2020-06-12 PROCEDURE — 99203 OFFICE O/P NEW LOW 30 MIN: CPT | Performed by: PHYSICIAN ASSISTANT

## 2020-06-12 NOTE — PROGRESS NOTES
JANUSZ Wells     Chief Complaint   Patient presents with   • Hearing Loss     L ear - around March        HISTORY OF PRESENT ILLNESS:     Laureano Lai is a  70 y.o.  male who complains of ear fullness, ear pressure and decreased hearing. The symptoms are localized to the left ear. The patient has had moderate to severe symptoms. The symptoms have been relatively constant for the last several months. The symptoms are aggravated by  no identifiable factors. The symptoms are improved by no identifiable factors.          Review of Systems   Constitutional: Negative for activity change, appetite change, chills, diaphoresis, fatigue, fever and unexpected weight change.   HENT: Positive for hearing loss and tinnitus. Negative for congestion, ear discharge, ear pain, facial swelling, mouth sores, nosebleeds, postnasal drip, rhinorrhea, sinus pressure, sneezing, sore throat, trouble swallowing and voice change.    Eyes: Negative for pain, discharge, redness, itching and visual disturbance.   Respiratory: Negative for apnea, cough, choking, chest tightness, shortness of breath, wheezing and stridor.    Gastrointestinal: Negative for nausea and vomiting.   Endocrine: Negative for cold intolerance and heat intolerance.   Musculoskeletal: Negative for arthralgias, back pain, gait problem, neck pain and neck stiffness.   Skin: Negative for rash.   Allergic/Immunologic: Negative for environmental allergies and food allergies.   Neurological: Negative for dizziness, tremors, seizures, syncope, facial asymmetry, speech difficulty, weakness, light-headedness, numbness and headaches.   Hematological: Negative for adenopathy. Does not bruise/bleed easily.   Psychiatric/Behavioral: Negative for behavioral problems, sleep disturbance and suicidal ideas. The patient is not nervous/anxious and is not hyperactive.    :    Past History:  Past Medical History:   Diagnosis Date   • Allergic rhinitis    • Ankylosis    •  Dos Santos's esophagus determined by endoscopy    • Bronchitis    • CVA (cerebral vascular accident) (CMS/[facility])    • DDD (degenerative disc disease), lumbar    • GERD (gastroesophageal reflux disease)    • Hypercholesteremia    • Hypertension    • PONV (postoperative nausea and vomiting)    • Radiculopathy, lumbar region    • Scalp psoriasis    • URI (upper respiratory infection)    • Vertigo      Past Surgical History:   Procedure Laterality Date   • COLONOSCOPY  2011    normal   • COLONOSCOPY N/A 2019    Procedure: COLONOSCOPY WITH ANESTHESIA;  Surgeon: Reuben Gruber DO;  Location: [facility] ENDOSCOPY;  Service: Gastroenterology   • ENDOSCOPY  2015    stable dos santos esophagus   • ENDOSCOPY N/A 2019    Procedure: ESOPHAGOGASTRODUODENOSCOPY WITH ANESTHESIA;  Surgeon: Reuben Gruber DO;  Location: [facility] ENDOSCOPY;  Service: Gastroenterology   • EYE SURGERY     • KNEE ARTHROSCOPY      right knee x 2   • NECK SURGERY     • ROTATOR CUFF REPAIR       Family History   Problem Relation Age of Onset   • Heart disease Mother    • Hypertension Mother    • Hypertension Father    • Heart disease Father    • Cancer Father    • Hypertension Sister    • Diabetes Sister    • Hypertension Brother    • Diabetes Paternal Grandmother      Social History     Tobacco Use   • Smoking status: Former Smoker     Packs/day: 1.00     Years: 15.00     Pack years: 15.00     Types: Cigarettes     Last attempt to quit:      Years since quittin.4   • Smokeless tobacco: Current User     Types: Chew   Substance Use Topics   • Alcohol use: No   • Drug use: No     Outpatient Medications Marked as Taking for the 20 encounter (Office Visit) with Richar Bee PA   Medication Sig Dispense Refill   • aspirin 81 MG chewable tablet Chew 81 mg Daily.     • losartan-hydrochlorothiazide (HYZAAR) 50-12.5 MG per tablet TAKE ONE TABLET BY MOUTH DAILY  3   • omeprazole (PriLOSEC) 40 MG capsule Take 40 mg by mouth  Daily.     • simvastatin (ZOCOR) 40 MG tablet Take 40 mg by mouth Every Night.       Allergies:  Penicillins; Terramycin [oxytetracycline]; Codeine; Influenza vaccines; Lipitor [atorvastatin]; and Mycinaire nasal mist [sodium chloride]          Vital Signs:   Vitals:    06/12/20 1318   BP: 155/94   Pulse: 93   Temp: 97.9 °F (36.6 °C)         EXAMINATION:   CONSTITUTIONAL: well nourished, alert, oriented, in no acute distress     COMMUNICATION AND VOICE: able to communicate normally, normal voice quality    HEAD: normocephalic, no lesions, atraumatic, no tenderness, no masses     FACE: appearance normal, no lesions, no tenderness, no deformities, facial motion symmetric    SALIVARY GLANDS: parotid glands with no tenderness, no swelling, no masses, submandibular glands with normal size, nontender    EYES: ocular motility normal, eyelids normal, orbits normal, no proptosis, conjunctiva normal , pupils equal, round     EARS:  Hearing: response to conversational voice normal bilaterally   External Ears: auricles without lesions  Otoscopic: cerumen removed from left ear canal for exam, see procedure note; tympanic membrane appearance normal, no lesions, no perforation, normal mobility, no fluid    NOSE:  External Nose: structure normal, no tenderness on palpation, no nasal discharge, no lesions, no evidence of trauma, nostrils patent   Intranasal Exam: nasal mucosa normal, vestibule within normal limits, inferior turbinate normal, nasal septum midline     ORAL:  Lips: upper and lower lips without lesion   Teeth: dentition within normal limits for age   Gums: gingivae healthy   Oral Mucosa: oral mucosa normal, no mucosal lesions   Floor of Mouth: Warthin’s duct patent, mucosa normal  Tongue: lingual mucosa normal without lesions, normal tongue mobility   Palate: soft and hard palates with normal mucosa and structure  Oropharynx: oropharyngeal mucosa normal    NECK: neck appearance normal, no mass,  noted without erythema  or tenderness    THYROID: no overt thyromegaly, no tenderness, nodules or mass present on palpation, position midline     LYMPH NODES: no lymphadenopathy    CHEST/RESPIRATORY: respiratory effort normal, normal breath sounds     CARDIOVASCULAR: rate and rhythm normal, extremities without cyanosis or edema      NEUROLOGIC/PSYCHIATRIC: oriented to time, place and person, mood normal, affect appropriate, CN II-XII intact grossly    RESULTS REVIEW:    I have reviewed the patients old records in the chart.       Assessment    Diagnosis Plan   1. Sensorineural hearing loss (SNHL) of right ear with restricted hearing of left ear     2. 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 Tinnitus of both ears     4. Impacted cerumen of left ear         Plan    Patient Instructions   Cerumen/fungal accumulation removed from left ear and ACHS powder placed, recheck in 1 week.               Return in about 1 week (around 6/19/2020) for Recheck left ear.    JANUSZ Wells  06/12/20  15:23

## 2020-06-12 NOTE — PROGRESS NOTES
Procedure Note    Pre-operative Diagnosis: Cerumen impaction/left    Post-operative Diagnosis: same    Anesthesia: none    Procedure:  Binocular ear microscopy with cerumen removal    Procedure Details:    The patient was placed supine on the procedure table. Using a speculum, the ear was examined with a microscope. Cerumen and fungal accumulation removed from left ear with suction, ACHS powder placed in left ear canal.    Condition:  Stable.  Patient tolerated procedure well.    Complications:  None

## 2020-06-19 ENCOUNTER — OFFICE VISIT (OUTPATIENT)
Dept: OTOLARYNGOLOGY | Facility: CLINIC | Age: 70
End: 2020-06-19

## 2020-06-19 VITALS
WEIGHT: 159 LBS | HEIGHT: 65 IN | SYSTOLIC BLOOD PRESSURE: 155 MMHG | DIASTOLIC BLOOD PRESSURE: 95 MMHG | BODY MASS INDEX: 26.49 KG/M2 | TEMPERATURE: 98.3 F | HEART RATE: 84 BPM

## 2020-06-19 DIAGNOSIS — H90.A32 MIXED CONDUCTIVE AND SENSORINEURAL HEARING LOSS OF LEFT EAR WITH RESTRICTED HEARING OF RIGHT EAR: Primary | ICD-10-CM

## 2020-06-19 DIAGNOSIS — H90.A21 SENSORINEURAL HEARING LOSS (SNHL) OF RIGHT EAR WITH RESTRICTED HEARING OF LEFT EAR: ICD-10-CM

## 2020-06-19 DIAGNOSIS — H93.13 TINNITUS OF BOTH EARS: ICD-10-CM

## 2020-06-19 PROCEDURE — 99213 OFFICE O/P EST LOW 20 MIN: CPT | Performed by: PHYSICIAN ASSISTANT

## 2020-06-19 NOTE — PROGRESS NOTES
JANUSZ Wells     Chief Complaint   Patient presents with   • Ear Problem        HISTORY OF PRESENT ILLNESS:     Laureano Lai is a  70 y.o.  male who presents for follow-up evaluation of complaints of ear fullness, ear pressure and decreased hearing. The symptoms are localized to the left ear. The patient has had improved symptoms. The symptoms have been decreasing in amount for the last several days. The symptoms are aggravated by  no identifiable factors. The symptoms are improved by ear cleaning and ACHS powder.          Review of Systems   Constitutional: Negative for activity change, appetite change, chills, diaphoresis, fatigue, fever and unexpected weight change.   HENT: Positive for hearing loss and tinnitus. Negative for congestion, ear discharge, ear pain, facial swelling, mouth sores, nosebleeds, postnasal drip, rhinorrhea, sinus pressure, sneezing, sore throat, trouble swallowing and voice change.    Eyes: Negative for pain, discharge, redness, itching and visual disturbance.   Respiratory: Negative for apnea, cough, choking, chest tightness, shortness of breath, wheezing and stridor.    Gastrointestinal: Negative for nausea and vomiting.   Endocrine: Negative for cold intolerance and heat intolerance.   Musculoskeletal: Negative for arthralgias, back pain, gait problem, neck pain and neck stiffness.   Skin: Negative for rash.   Allergic/Immunologic: Negative for environmental allergies and food allergies.   Neurological: Negative for dizziness, tremors, seizures, syncope, facial asymmetry, speech difficulty, weakness, light-headedness, numbness and headaches.   Hematological: Negative for adenopathy. Does not bruise/bleed easily.   Psychiatric/Behavioral: Negative for behavioral problems, sleep disturbance and suicidal ideas. The patient is not nervous/anxious and is not hyperactive.    :    Past History:  Past Medical History:   Diagnosis Date   • Allergic rhinitis    • Ankylosis    •  Dos Santos's esophagus determined by endoscopy    • Bronchitis    • CVA (cerebral vascular accident) (CMS/[facility])    • DDD (degenerative disc disease), lumbar    • GERD (gastroesophageal reflux disease)    • Hypercholesteremia    • Hypertension    • PONV (postoperative nausea and vomiting)    • Radiculopathy, lumbar region    • Scalp psoriasis    • URI (upper respiratory infection)    • Vertigo      Past Surgical History:   Procedure Laterality Date   • COLONOSCOPY  2011    normal   • COLONOSCOPY N/A 2019    Procedure: COLONOSCOPY WITH ANESTHESIA;  Surgeon: Reuben Gruber DO;  Location: [facility] ENDOSCOPY;  Service: Gastroenterology   • ENDOSCOPY  2015    stable dos santos esophagus   • ENDOSCOPY N/A 2019    Procedure: ESOPHAGOGASTRODUODENOSCOPY WITH ANESTHESIA;  Surgeon: Reuben Gruber DO;  Location: [facility] ENDOSCOPY;  Service: Gastroenterology   • EYE SURGERY     • KNEE ARTHROSCOPY      right knee x 2   • NECK SURGERY     • ROTATOR CUFF REPAIR       Family History   Problem Relation Age of Onset   • Heart disease Mother    • Hypertension Mother    • Hypertension Father    • Heart disease Father    • Cancer Father    • Hypertension Sister    • Diabetes Sister    • Hypertension Brother    • Diabetes Paternal Grandmother      Social History     Tobacco Use   • Smoking status: Former Smoker     Packs/day: 1.00     Years: 15.00     Pack years: 15.00     Types: Cigarettes     Last attempt to quit:      Years since quittin.4   • Smokeless tobacco: Current User     Types: Chew   Substance Use Topics   • Alcohol use: No   • Drug use: No     Outpatient Medications Marked as Taking for the 20 encounter (Office Visit) with Richar Bee PA   Medication Sig Dispense Refill   • aspirin 81 MG chewable tablet Chew 81 mg Daily.     • losartan-hydrochlorothiazide (HYZAAR) 50-12.5 MG per tablet TAKE ONE TABLET BY MOUTH DAILY  3   • omeprazole (PriLOSEC) 40 MG capsule Take 40 mg by mouth  Daily.     • simvastatin (ZOCOR) 40 MG tablet Take 40 mg by mouth Every Night.       Allergies:  Penicillins; Terramycin [oxytetracycline]; Codeine; Influenza vaccines; Lipitor [atorvastatin]; and Mycinaire nasal mist [sodium chloride]          Vital Signs:   Vitals:    06/19/20 0943   BP: 155/95   Pulse: 84   Temp: 98.3 °F (36.8 °C)         EXAMINATION:   CONSTITUTIONAL: well nourished, alert, oriented, in no acute distress     COMMUNICATION AND VOICE: able to communicate normally, normal voice quality    HEAD: normocephalic, no lesions, atraumatic, no tenderness, no masses     FACE: appearance normal, no lesions, no tenderness, no deformities, facial motion symmetric    EYES: ocular motility normal, eyelids normal, orbits normal, no proptosis, conjunctiva normal , pupils equal, round     EARS:  Hearing: response to conversational voice normal bilaterally   External Ears: auricles without lesions  Otoscopic: bilateral tympanic membrane appearance normal, no lesions, no perforation, normal mobility, no fluid    NOSE:  External Nose: structure normal, no tenderness on palpation, no nasal discharge, no lesions, no evidence of trauma, nostrils patent      ORAL:  Lips: upper and lower lips without lesion     NECK: neck appearance normal    CHEST/RESPIRATORY: respiratory effort normal, normal breath sounds     CARDIOVASCULAR: rate and rhythm normal, extremities without cyanosis or edema      NEUROLOGIC/PSYCHIATRIC: oriented to time, place and person, mood normal, affect appropriate, CN II-XII intact grossly    RESULTS REVIEW:    I have reviewed the patients old records in the chart.       Assessment    Diagnosis Plan   1. 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 Sensorineural hearing loss (SNHL) of right ear with restricted hearing of left ear     3. Tinnitus of both ears         Plan    Patient Instructions   Will recheck in 3 months, if symptoms develop  call for sooner appointment.               Return in about 3 months (around 9/19/2020) for Recheck ears.    JANUSZ Wells  06/19/20  09:50

## 2020-09-21 ENCOUNTER — OFFICE VISIT (OUTPATIENT)
Dept: OTOLARYNGOLOGY | Facility: CLINIC | Age: 70
End: 2020-09-21

## 2020-09-21 VITALS
HEART RATE: 78 BPM | WEIGHT: 159.4 LBS | BODY MASS INDEX: 26.56 KG/M2 | HEIGHT: 65 IN | SYSTOLIC BLOOD PRESSURE: 157 MMHG | DIASTOLIC BLOOD PRESSURE: 77 MMHG | TEMPERATURE: 98.2 F

## 2020-09-21 DIAGNOSIS — H90.A21 SENSORINEURAL HEARING LOSS (SNHL) OF RIGHT EAR WITH RESTRICTED HEARING OF LEFT EAR: ICD-10-CM

## 2020-09-21 DIAGNOSIS — H90.A32 MIXED CONDUCTIVE AND SENSORINEURAL HEARING LOSS OF LEFT EAR WITH RESTRICTED HEARING OF RIGHT EAR: Primary | ICD-10-CM

## 2020-09-21 DIAGNOSIS — H93.13 TINNITUS OF BOTH EARS: ICD-10-CM

## 2020-09-21 PROCEDURE — 99213 OFFICE O/P EST LOW 20 MIN: CPT | Performed by: PHYSICIAN ASSISTANT

## 2020-09-21 RX ORDER — NIFEDIPINE 90 MG/1
90 TABLET, EXTENDED RELEASE ORAL DAILY
COMMUNITY
Start: 2020-09-01

## 2020-09-21 NOTE — PROGRESS NOTES
JANUSZ Wells     Chief Complaint   Patient presents with   • Follow-up        HISTORY OF PRESENT ILLNESS:     Laureano Lai is a  70 y.o.  male who presents for follow-up evaluation of complaints of ear fullness, ear pressure and decreased hearing. The symptoms are localized to the left ear. The patient has had resolved symptoms. The symptoms have been resolved for the last several months. The symptoms are aggravated by  no identifiable factors. The symptoms are resolved by ear cleaning and ACHS powder.          Review of Systems   Constitutional: Negative for activity change, appetite change, chills, diaphoresis, fatigue, fever and unexpected weight change.   HENT: Positive for hearing loss and tinnitus. Negative for congestion, ear discharge, ear pain, facial swelling, mouth sores, nosebleeds, postnasal drip, rhinorrhea, sinus pressure, sneezing, sore throat, trouble swallowing and voice change.         History of otitis externa and cerumen impaction on the left   Eyes: Negative for pain, discharge, redness, itching and visual disturbance.   Respiratory: Negative for apnea, cough, choking, chest tightness, shortness of breath, wheezing and stridor.    Gastrointestinal: Negative for nausea and vomiting.   Endocrine: Negative for cold intolerance and heat intolerance.   Musculoskeletal: Negative for arthralgias, back pain, gait problem, neck pain and neck stiffness.   Skin: Negative for rash.   Allergic/Immunologic: Negative for environmental allergies and food allergies.   Neurological: Negative for dizziness, tremors, seizures, syncope, facial asymmetry, speech difficulty, weakness, light-headedness, numbness and headaches.   Hematological: Negative for adenopathy. Does not bruise/bleed easily.   Psychiatric/Behavioral: Negative for behavioral problems, sleep disturbance and suicidal ideas. The patient is not nervous/anxious and is not hyperactive.    :    Past History:  Past Medical History:    Diagnosis Date   • Allergic rhinitis    • Ankylosis    • Dos Santos's esophagus determined by endoscopy    • Bronchitis    • CVA (cerebral vascular accident) (CMS/[facility])    • DDD (degenerative disc disease), lumbar    • GERD (gastroesophageal reflux disease)    • Hypercholesteremia    • Hypertension    • PONV (postoperative nausea and vomiting)    • Radiculopathy, lumbar region    • Scalp psoriasis    • URI (upper respiratory infection)    • Vertigo      Past Surgical History:   Procedure Laterality Date   • COLONOSCOPY  2011    normal   • COLONOSCOPY N/A 2019    Procedure: COLONOSCOPY WITH ANESTHESIA;  Surgeon: Reuben Gruber DO;  Location: [facility] ENDOSCOPY;  Service: Gastroenterology   • ENDOSCOPY  2015    stable dos santos esophagus   • ENDOSCOPY N/A 2019    Procedure: ESOPHAGOGASTRODUODENOSCOPY WITH ANESTHESIA;  Surgeon: Reuben Gruber DO;  Location: [facility] ENDOSCOPY;  Service: Gastroenterology   • EYE SURGERY     • KNEE ARTHROSCOPY      right knee x 2   • NECK SURGERY     • ROTATOR CUFF REPAIR       Family History   Problem Relation Age of Onset   • Heart disease Mother    • Hypertension Mother    • Hypertension Father    • Heart disease Father    • Cancer Father    • Hypertension Sister    • Diabetes Sister    • Hypertension Brother    • Diabetes Paternal Grandmother      Social History     Tobacco Use   • Smoking status: Former Smoker     Packs/day: 1.00     Years: 15.00     Pack years: 15.00     Types: Cigarettes     Quit date:      Years since quittin.7   • Smokeless tobacco: Current User     Types: Chew   Substance Use Topics   • Alcohol use: No   • Drug use: No     Outpatient Medications Marked as Taking for the 20 encounter (Office Visit) with Richar Bee PA   Medication Sig Dispense Refill   • aspirin 81 MG chewable tablet Chew 81 mg Daily.     • losartan-hydrochlorothiazide (HYZAAR) 50-12.5 MG per tablet TAKE ONE TABLET BY MOUTH DAILY  3   • NIFEdipine  XL (PROCARDIA XL) 90 MG 24 hr tablet Take 90 mg by mouth Daily.     • omeprazole (PriLOSEC) 40 MG capsule Take 40 mg by mouth Daily.     • simvastatin (ZOCOR) 40 MG tablet Take 40 mg by mouth Every Night.       Allergies:  Penicillins, Terramycin [oxytetracycline], Codeine, Influenza vaccines, Lipitor [atorvastatin], and Mycinaire nasal mist [sodium chloride]          Vital Signs:   Vitals:    09/21/20 0943   BP: 157/77   Pulse: 78   Temp: 98.2 °F (36.8 °C)         EXAMINATION:   Physical Exam  CONSTITUTIONAL: well nourished, alert, oriented, in no acute distress      COMMUNICATION AND VOICE: able to communicate normally, normal voice quality     HEAD: normocephalic, no lesions, atraumatic, no tenderness, no masses      FACE: appearance normal, no lesions, no tenderness, no deformities, facial motion symmetric     EYES: ocular motility normal, eyelids normal, orbits normal, no proptosis, conjunctiva normal , pupils equal, round      EARS:  Hearing: response to conversational voice normal bilaterally   External Ears: auricles without lesions  Otoscopic: tympanic membrane appearance normal, no lesions, no perforation, normal mobility, no fluid     NOSE:  External Nose: structure normal, no tenderness on palpation, no nasal discharge, no lesions, no evidence of trauma, nostrils patent   Intranasal Exam: nasal mucosa normal, vestibule within normal limits, inferior turbinate normal, nasal septum midline      ORAL:  Lips: upper and lower lips without lesion   Teeth: dentition within normal limits for age   Gums: gingivae healthy   Oral Mucosa: oral mucosa normal, no mucosal lesions   Floor of Mouth: Warthin’s duct patent, mucosa normal  Tongue: lingual mucosa normal without lesions, normal tongue mobility   Palate: soft and hard palates with normal mucosa and structure  Oropharynx: oropharyngeal mucosa normal     NECK: neck appearance normal     CHEST/RESPIRATORY: respiratory effort normaL     CARDIOVASCULAR: extremities  without cyanosis or edema       NEUROLOGIC/PSYCHIATRIC: oriented to time, place and person, mood normal, affect appropriate, CN II-XII intact grossly      RESULTS REVIEW:    I have reviewed the patients old records in the chart.       Assessment    Diagnosis Plan   1. 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 Sensorineural hearing loss (SNHL) of right ear with restricted hearing of left ear     3. Tinnitus of both ears         Plan    Patient Instructions   Call for follow-up appointment if symptoms return/develop.               Return if symptoms worsen or fail to improve.    JANUSZ Wells  09/21/20  10:02 CDT

## 2020-11-03 ENCOUNTER — TRANSCRIBE ORDERS (OUTPATIENT)
Dept: ADMINISTRATIVE | Facility: HOSPITAL | Age: 70
End: 2020-11-03

## 2020-11-03 DIAGNOSIS — M51.37 OTHER INTERVERTEBRAL DISC DEGENERATION, LUMBOSACRAL REGION: Primary | ICD-10-CM

## 2020-11-03 DIAGNOSIS — M54.16 LUMBAR RADICULOPATHY, CHRONIC: ICD-10-CM

## 2020-11-05 ENCOUNTER — HOSPITAL ENCOUNTER (OUTPATIENT)
Dept: MRI IMAGING | Facility: HOSPITAL | Age: 70
Discharge: HOME | End: 2020-11-05
Admitting: FAMILY MEDICINE

## 2020-11-05 DIAGNOSIS — M54.16 LUMBAR RADICULOPATHY, CHRONIC: ICD-10-CM

## 2020-11-05 DIAGNOSIS — M51.37 OTHER INTERVERTEBRAL DISC DEGENERATION, LUMBOSACRAL REGION: ICD-10-CM

## 2020-11-05 PROCEDURE — 72148 MRI LUMBAR SPINE W/O DYE: CPT

## 2020-11-10 RX ORDER — MEPERIDINE HYDROCHLORIDE 50 MG/ML
INJECTION INTRAMUSCULAR; INTRAVENOUS; SUBCUTANEOUS
Status: DISPENSED
Start: 2020-11-10 | End: 2020-11-11

## 2021-02-01 ENCOUNTER — HOSPITAL ENCOUNTER (OUTPATIENT)
Dept: GENERAL RADIOLOGY | Facility: HOSPITAL | Age: 71
Discharge: HOME | End: 2021-02-01

## 2021-02-01 ENCOUNTER — LAB (OUTPATIENT)
Dept: LAB | Facility: HOSPITAL | Age: 71
End: 2021-02-01

## 2021-02-01 ENCOUNTER — OFFICE VISIT (OUTPATIENT)
Dept: NEUROSURGERY | Facility: CLINIC | Age: 71
End: 2021-02-01

## 2021-02-01 VITALS — HEIGHT: 65 IN | BODY MASS INDEX: 26.66 KG/M2 | WEIGHT: 160 LBS

## 2021-02-01 DIAGNOSIS — M50.30 DEGENERATION OF CERVICAL INTERVERTEBRAL DISC: ICD-10-CM

## 2021-02-01 DIAGNOSIS — E66.3 OVERWEIGHT (BMI 25.0-29.9): ICD-10-CM

## 2021-02-01 DIAGNOSIS — M51.36 DEGENERATION OF LUMBAR INTERVERTEBRAL DISC: Primary | ICD-10-CM

## 2021-02-01 DIAGNOSIS — M51.36 DEGENERATION OF LUMBAR INTERVERTEBRAL DISC: ICD-10-CM

## 2021-02-01 DIAGNOSIS — Z72.0 CHEWING TOBACCO USE: ICD-10-CM

## 2021-02-01 PROBLEM — M51.369 DEGENERATION OF LUMBAR INTERVERTEBRAL DISC: Status: ACTIVE | Noted: 2021-02-01

## 2021-02-01 LAB — AST SERPL-CCNC: 22 U/L (ref 1–40)

## 2021-02-01 PROCEDURE — 86225 DNA ANTIBODY NATIVE: CPT

## 2021-02-01 PROCEDURE — 82550 ASSAY OF CK (CPK): CPT

## 2021-02-01 PROCEDURE — 84450 TRANSFERASE (AST) (SGOT): CPT

## 2021-02-01 PROCEDURE — 86038 ANTINUCLEAR ANTIBODIES: CPT

## 2021-02-01 PROCEDURE — 86235 NUCLEAR ANTIGEN ANTIBODY: CPT

## 2021-02-01 PROCEDURE — 36415 COLL VENOUS BLD VENIPUNCTURE: CPT

## 2021-02-01 PROCEDURE — 99204 OFFICE O/P NEW MOD 45 MIN: CPT | Performed by: NEUROLOGICAL SURGERY

## 2021-02-01 PROCEDURE — 83615 LACTATE (LD) (LDH) ENZYME: CPT

## 2021-02-01 PROCEDURE — 82085 ASSAY OF ALDOLASE: CPT

## 2021-02-01 PROCEDURE — 72040 X-RAY EXAM NECK SPINE 2-3 VW: CPT

## 2021-02-01 PROCEDURE — 86431 RHEUMATOID FACTOR QUANT: CPT

## 2021-02-01 PROCEDURE — 82784 ASSAY IGA/IGD/IGG/IGM EACH: CPT

## 2021-02-01 RX ORDER — DICLOFENAC SODIUM 75 MG/1
75 TABLET, DELAYED RELEASE ORAL 2 TIMES DAILY
Qty: 60 TABLET | Refills: 3 | Status: SHIPPED | OUTPATIENT
Start: 2021-02-01 | End: 2021-03-03

## 2021-02-01 NOTE — PROGRESS NOTES
Primary Care Provider: Oneil Awad MD    Chief Complaint:   Chief Complaint   Patient presents with   • Back Pain     Chronic low back pain x 10+ years, recent worsening over past 8-12 months. Minimal leg pain, occasional episodes of pain shooting into B buttock. No prior conservative care. Had steroid injection with PCP today, pain may be slightly improved. Recently had to rehome his hunting dogs as he is not able to walk (due to pain) to hunt them.       History of Present Illness  Laureano Lai is a 70 y.o. male is being seen for consultation today at the request of Oneil Awad MD    Laureano symptom originally began 10 to 12 years ago he has had a gradual progressive worsening over the last year.    2004 had a stroke and is currently on aspirin 81 mg    2012 he underwent anterior cervical fusion with Dr. Spring in which she had a C2-C5 anterior cervical discectomy and fusion.  He was told at this time that he would be back for both his neck and low back.    June 2019 the patient had a significant worsening his low back pain.  He went to see a chiropractor in [facility].  After several visits he found that this improved his mobility quite significantly.  However this is now returned.  He had an MRI performed which was suggestive of some foraminal stenosis.  He saw his primary care physician as of this morning which she received a cortisol injection and he is beginning to have some significant improvement from this.    Of note the patient had to get rid of his scrotal dogs due to inability to walk them.  Additionally the patient had a son was involved in a motor vehicle accident became addicted to narcotic medications and therefore refuses any form of opioids.    Currently Laureano's pain is located in low back pain  Severity: 12/10 before the cortisol shot from his primary care physician.  He has had some improvement since then  Percentage pain: 90% back pain and 10% buttock  pain  Radiation: Radiates into his bilateral buttocks right greater than left.  This is been going approximately 2 years and comes and goes.  This is worsened with rolling over.  Numbness: Occasional numbness and tingling in his toes but no consistent numbness.  Fine Motor Skills: Decreased fine motor skills in his bilateral hands.  He feels as if he has hand weakness.  He has more severe joint pain in his right hand and has some difficulty making fist in the morning, but this improves throughout the day.  Gait: Painful antalgic gait with some kyphosis.  He also notices a slight limp.  He can only walk approximately quarter of a mile on level ground and only 100 yards uphill.  Bowel and Bladder Function: None    Exacerbating factors: Turning over in bed, tying his shoes, or anything that involves bending or twisting at the hip  Ameliorating factors: Remaining more or less motionless    Alternative therapies: Previously tried chiropractic.  Some improvement from steroid shot.  Refuses narcotics due to his history of signs of addiction, has not tried injections or other prescription nonsteroidal anti-inflammatories    Oswestry Disability Index Lumbar = 62%   Score   Pain Intensity Moderate pain-2   Personal Care I can look after myself but it is slow and painful-2   Lifting I can not carry anything-5   Walking Pain prevents > 1/4 mile-2   Sitting Pain prevents sitting > 10 min-4   Standing Pain limits standing to < 10 min-4   Sleeping Can only sleep < 4 hrs-3   Sex Life (if applicable) Not applicable-0   Social Life No social life because of pain-5   Traveling Pain restricts to <30 min-4   (Evan et al, 1980)    SCORE INTERPRETATION OF THE OSWESTRY LBP DISABILITY QUESTIONNAIRE   60-80% Crippled Back pain impinges on all aspects of these patients' lives both at home and at work. Positive intervention is required.     Review of Systems   Constitutional: Positive for activity change and chills.   HENT: Positive for  dental problem and hearing loss.    Eyes: Positive for itching.   Respiratory: Negative.    Cardiovascular: Negative.    Gastrointestinal: Negative.    Endocrine: Negative.    Genitourinary: Negative.    Musculoskeletal: Positive for back pain, gait problem, joint swelling, neck pain and neck stiffness.   Skin: Negative.    Allergic/Immunologic: Negative.    Hematological: Bruises/bleeds easily.   Psychiatric/Behavioral: Positive for behavioral problems, confusion, decreased concentration, sleep disturbance and suicidal ideas. The patient is nervous/anxious.        Past Medical History:   Diagnosis Date   • Allergic rhinitis    • Ankylosis    • CVA (cerebral vascular accident) (CMS/HCC)    • DDD (degenerative disc disease), lumbar    • GERD (gastroesophageal reflux disease)    • Hypercholesteremia    • Hypertension    • Radiculopathy, lumbar region    • Scalp psoriasis    • Vertigo        Past Surgical History:   Procedure Laterality Date   • COLONOSCOPY  09/01/2011    normal   • COLONOSCOPY N/A 8/27/2019    Procedure: COLONOSCOPY WITH ANESTHESIA;  Surgeon: Reuben Gruber DO;  Location: [facility] ENDOSCOPY;  Service: Gastroenterology   • ENDOSCOPY  06/19/2015    stable dos santos esophagus   • ENDOSCOPY N/A 8/29/2019    Procedure: ESOPHAGOGASTRODUODENOSCOPY WITH ANESTHESIA;  Surgeon: Reuben Gruber DO;  Location: [facility] ENDOSCOPY;  Service: Gastroenterology   • EYE SURGERY     • KNEE ARTHROSCOPY      right knee x 2   • NECK SURGERY     • ROTATOR CUFF REPAIR         Family History: family history includes Cancer in his father; Diabetes in his paternal grandmother and sister; Heart disease in his father and mother; Hypertension in his brother, father, mother, and sister.    Social History:  reports that he has quit smoking. His smoking use included cigarettes. His smokeless tobacco use includes chew. He reports that he does not drink alcohol or use drugs.    Medications:    Current Outpatient Medications:   •   aspirin 81 MG chewable tablet, Chew 81 mg Daily., Disp: , Rfl:   •  losartan-hydrochlorothiazide (HYZAAR) 50-12.5 MG per tablet, Take 1 tablet by mouth Daily., Disp: , Rfl: 3  •  NIFEdipine XL (PROCARDIA XL) 90 MG 24 hr tablet, Take 90 mg by mouth Daily., Disp: , Rfl:   •  omeprazole (PriLOSEC) 40 MG capsule, Take 40 mg by mouth Daily., Disp: , Rfl:   •  simvastatin (ZOCOR) 40 MG tablet, Take 40 mg by mouth Every Night., Disp: , Rfl:   •  diclofenac (VOLTAREN) 75 MG EC tablet, Take 1 tablet by mouth 2 (Two) Times a Day for 30 days., Disp: 60 tablet, Rfl: 3    Allergies:  Penicillins, Terramycin [oxytetracycline], Codeine, Influenza vaccines, Lipitor [atorvastatin], and Mycinaire nasal mist [sodium chloride]    Objective   Physical Exam  Constitutional:       Appearance: He is well-developed.   HENT:      Head: Normocephalic and atraumatic.   Eyes:      Extraocular Movements: EOM normal.      Conjunctiva/sclera: Conjunctivae normal.      Pupils: Pupils are equal, round, and reactive to light.      Funduscopic exam:     Right eye: No hemorrhage, exudate or papilledema.         Left eye: No hemorrhage, exudate or papilledema.   Neck:      Musculoskeletal: Normal range of motion and neck supple.   Cardiovascular:      Pulses:           Dorsalis pedis pulses are 2+ on the right side and 2+ on the left side.        Posterior tibial pulses are 2+ on the right side and 2+ on the left side.   Pulmonary:      Effort: Pulmonary effort is normal. No respiratory distress.   Skin:     General: Skin is warm.      Findings: No erythema or rash.   Neurological:      Mental Status: He is oriented to person, place, and time.      Coordination: Finger-Nose-Finger Test and Heel to Shin Test normal.      Gait: Tandem walk normal.      Deep Tendon Reflexes:      Reflex Scores:       Tricep reflexes are 1+ on the right side and 1+ on the left side.       Bicep reflexes are 1+ on the right side and 1+ on the left side.       Brachioradialis  reflexes are 1+ on the right side and 1+ on the left side.       Patellar reflexes are 3+ on the right side and 3+ on the left side.       Achilles reflexes are 2+ on the right side and 2+ on the left side.  Psychiatric:         Speech: Speech normal.       Neurologic Exam     Mental Status   Oriented to person, place, and time.   Registration: recalls 3 of 3 objects. Recall at 5 minutes: recalls 3 of 3 objects.   Attention: normal. Concentration: normal.   Speech: speech is normal   Level of consciousness: alert  Knowledge: consistent with education.     Cranial Nerves     CN II   Visual acuity: normal    CN III, IV, VI   Pupils are equal, round, and reactive to light.  Extraocular motions are normal.   Diplopia: none    CN V   Facial sensation intact.   Right corneal reflex: normal  Left corneal reflex: normal    CN VII   Right facial weakness: none  Left facial weakness: none    CN VIII   Hearing: intact    CN IX, X   Palate: symmetric  Right gag reflex: normal  Left gag reflex: normal    CN XI   Right trapezius strength: normal  Left trapezius strength: normal    CN XII   Tongue deviation: none    Motor Exam   Right arm tone: increased  Left arm tone: increased  Right arm pronator drift: absent  Left arm pronator drift: absent  Right leg tone: increased  Left leg tone: increased    Strength   Right deltoid: 5/5  Left deltoid: 5/5  Right biceps: 5/5  Left biceps: 5/5  Right triceps: 5/5  Left triceps: 5/5  Right interossei: 5/5  Left interossei: 5/5  Right iliopsoas: 5/5  Left iliopsoas: 5/5  Right quadriceps: 5/5  Left quadriceps: 5/5  Right anterior tibial: 5/5  Left anterior tibial: 5/5  Right gastroc: 5/5  Left gastroc: 5/5Right EHL 5/5   Left EHL 5/5     Sensory Exam   Right arm light touch: decreased from wrist  Left arm light touch: decreased from wrist  Right leg light touch: decreased from toes  Left leg light touch: decreased from toes  Proprioception normal.     Gait, Coordination, and Reflexes      Gait  Gait: spastic and wide-based    Coordination   Finger to nose coordination: normal  Heel to shin coordination: normal  Tandem walking coordination: normal    Reflexes   Right brachioradialis: 1+  Left brachioradialis: 1+  Right biceps: 1+  Left biceps: 1+  Right triceps: 1+  Left triceps: 1+  Right patellar: 3+  Left patellar: 3+  Right achilles: 2+  Left achilles: 2+  Right : 0  Left : 0  Right plantar: upgoing  Left plantar: upgoing  Right Storey: absent  Left Storey: absent  Right ankle clonus: absent  Left ankle clonus: absent      Gait:  Able to heel and toe walk without difficulty    Back exam:   No point tenderness over spine   No point tenderness over SI joint right and left   No pain with later loading of hip    Hip exam:   Negative RUY right and left    Negative straight leg Raise bilaterally.    Male  strength (pounds)  AGE Right Hand RH Norms Left Hand LH Norms   20-24  121+20.6  104+21.8   25-29  120+23.0  110+16.2   30-34  121+22.4  110+21.7   35-39  119+24  113+21.7   40-44  117+20.7  112+18.7   45-49  110+23.0  101+22.8   50-54  113+18.1  102+17   55-59  101+26.7  83+23.4   60-64  90+20.4  77+20.3   65-69 79 91+20.6 98 76.8+19.8   70-74  75+21.5  65+18.1   75+  66+21.0  55+17.0   (RUDOLPH Bender et al; Hand Dynometer: Effects of trials and sessions.  Perpetual and Motor Skills 61:195-8, 1985)  Key  * = Dominant hand  > = Intervention        Imaging: (independent review and interpretation)  No radiology results for the last 30 days.    EXAM: MRI LUMBAR SPINE WO CONTRAST- - 11/5/2020 10:40 AM CST     HISTORY: M51.37; M51.37-Other intervertebral disc degeneration,  lumbosacral region; M54.16-Radiculopathy, lumbar region       COMPARISON: No existing relevant imaging studies available.      TECHNIQUE: Routine MR of the lumbar spine was performed without  intravenous contrast.     FINDINGS:  Five nonrib-bearing, lumbar-type vertebral bodies.  Normal vertebral  body alignment.  Normal  vertebral body height. No evidence of  compression fracture.  Bone marrow signal within normal limits.  Normal  intervertebral disc heights.  Conus terminates at L1, within normal  limits. Normal appearance of the distal thoracic cord and cauda equina  nerve roots.      L1-2: No disc bulge or spinal canal stenosis. Mild bilateral foraminal  stenosis  L2-3: Minimal disc bulge. No spinal canal stenosis. Mild bilateral  foraminal stenosis.  L3-4: Minimal disc bulge. No spinal canal stenosis. Mild right and  moderate left foraminal stenosis. Facet hypertrophy.  L4-5: Minimal disc bulge with superimposed central disc protrusion. No  spinal canal stenosis. Mild bilateral foraminal stenosis. Facet  hypertrophy.  L5-S1: Minimal disc bulge. No spinal canal stenosis. Severe right and no  left foraminal stenosis. Facet hypertrophy.     Extraspinal soft tissues within normal limits.        IMPRESSION:  1. No significant spinal canal stenosis.  2. Greatest foraminal stenosis appears severe at right L5-S1. Other  level by level findings as above.     This report was finalized on 11/05/2020 13:12 by Dr Karen Webster MD.        Noncontrast MRI of the lumbar spine is reviewed.  No evidence of significant central stenosis.  Disc height preserved.  Bridging osteophytes are noted anterior at all levels of the lumbar spine.  Report reviewed and suggestive of severe L5/S1 foraminal stenosis right greater than left.  Patient's complaints are more significant for left greater than right    ASSESSMENT and PLAN  Laureano Lai is a 70 y.o. male with a significant comorbidity of ankylosis, CVA, degenerative joint disease, hypertension, vertigo, and prior history of C2-C5 anterior cervical discectomy and fusion by Dr. Spring 2012. He presents with a new problem of worsening mechanical low back pain. RALPH = 62.  Physical exam findings of decreased range of motion in most joints in his back.  Reproduction of his pain with palpation of his  bilateral SI joints.  His imaging shows no clinically significant central or foraminal stenosis of bridging osteophytes throughout all level of his lumbar spine.    Cervical pain  Decreased fine motor function in his right greater than left hand  Status post C2-5 ACDF (Arrendal '12)  My general impression that this is most likely due to worsening arthropathy in his hands.  However he does maintain  strength in his right hand that are lower than expected compared to his left.  Given his prior history of anterior cervical discectomy and fusion we will obtain cervical x-rays today.  Currently at this time I see no evidence of myelopathy.      And mechanical axial Lumbar Pain:    Define his back pain without significant radiculopathy or weakness.  This can include referred pain radiating down posterior thighs without decent below the knees.     Differential diagnosis: Acute (<6 weeks) most typically trauma related, compression fracture, myofascial pain, drug induced myalgias (statins, Neulasta, or phosphodiesterase type V inhibitors such as tadalafil). Subacute (6 weeks - 3 months)  Infectious discitis, vertebral osteomyelitis, spinal epidural abscess, spinal tumor (intradural or extradural), multiple myeloma, sacroiliitis. Chronic (>3 months)  lumbar stenosis, spondylolisthesis with mechanical instability, degenerative facet arthropathy, coronal or sagittal spinal malalignment, failed back syndrome after surgery, flat back syndrome. Spondyloarthropathies including ankylosis spondylitis (HLA-B27), Paget's disease.  Fibromyalgia or other rheumatological disease. Malingering, conversion disorder and secondary gain are diagnoses of exclusion.    Laureano's signs and symptoms are most concerning for ankylosing spondylitis versus facet mediated arthropathy given his synptoms of back pain worsened with movement and signs of intact neurological exam.  Furthermore his imaging shows no evidence of convincing central or  foraminal stenosis but evidence of bridging osteophytes on his MRI.      Laureano has no radiographic pathology to explain his pain syndrome. In the absence of pain in a dermatomal distribution, true neurological deficit, spinal instability, or sagittal or coronal spinal imbalance the indication for spine surgery are few.  Several large studies have shown that spinal surgery for isolated axial back pain without radiculopathy has no significant effect on objective outcomes measures. This discussed this in detail with Laureano and he voiced understanding    Lumbar Injections  In patients with degenerative disease of the lumbar spine with chronic low back pain without radiculopathy ...  1. Epidural steroid injections (ESIs) are an option for the short-term relief (Level III evidence; C).  2. Caudal ESIs are an option for decreasing low-back pain > 6 weeks,  (Level III evidence; C).  3. Lumbar Facet Injections are NOT suggested for the treatment of facet-mediated pain (single Level II study and single Level III study; B).  4. Lumbar medial nerve blocks are suggested for the short-term relief of facet-mediated pain (single Level II study and single Level III study; B).  5. Lumbar medial nerve ablation is suggested for the short-term (3- to 6-month) relief of facet-mediated pain (4 Level II studies)  6. Trigger point injections (TPIs) performed as dry needling, with anesthetics alone or with steroids, are NOT recommended because a long-lasting benefit has not been demonstrated (Level II evidence; B).  7. Diagnostic facet blocks by the double-injection technique with an improvement threshold of 80% are an option for predicting a favorable response to facet medial nerve ablation (single Level II study).    TREATMENT RECOMMENDATIONS ...  Lumbar x-ray with flexion-extension to evaluate for instability.  PT/OT, prescription provided to patient.  We will refer this to [facility].  Particularly patient would appreciate assistive devices  with placing socks or shoes  Chiropractic as tolerated, referral to [facility]  Nonsteroidal anti-inflammatories, begin with diclofenac 75 mg twice daily  Referral to pain management, epidural injection ([facility]).  Of note patient does not desire narcotics given the history with his son.    Inflammatory workup: HLA-B27, Rheumatoid Factor, CCP, ABIMBOLA, anti-ds DNA, Anti-Porter, antiphospholipid antibodies, Anti-SSA, anti-SSB.  Myopathy evaluation: CK, aldolase, SGOT, SGPT, LDH and consider EMG and biopsy.  Particularly given statin use.    Obesity Counseling  We discussed Laureano's current weight and the effect on his spine, including premature dis degeneration and increased anterior force on the lumbar spine resulting in worsening facet arthropathy.  Laureano has a BMI 25.0-29.9        Classification: overweight.  We spent 5 minutes in weight management counseling including discussing current weight, current diet, and exercise patterns.  Additional we set goals for weight reduction.  Therefore above normal parameters. Recommendations include: educational material and exercise counseling.             Diagnoses and all orders for this visit:    1. Degeneration of lumbar intervertebral disc (Primary)  -     diclofenac (VOLTAREN) 75 MG EC tablet; Take 1 tablet by mouth 2 (Two) Times a Day for 30 days.  Dispense: 60 tablet; Refill: 3  -     Ambulatory Referral to Physical Therapy Evaluate and treat (3 days a week x 4-6 weeks); Stretching, Strengthening, ROM; Full weight bearing  -     Ambulatory Referral to Occupational Therapy  -     Ambulatory Referral to Pain Management  -     Aldolase; Future  -     ABIMBOLA; Future  -     Anti-DNA Antibody, Double-stranded; Future  -     Anti-Smith Antibody; Future  -     AST; Future  -     CK; Future  -     IgA; Future  -     HLA-B27 Antigen; Future  -     Lactate Dehydrogenase; Future  -     RHEUMATOID FACTOR; Future  -     Sjogrens Syndrome-A Extractable Nuclear Antibody; Future  -      Sjogrens Syndrome-B Extractable Nuclear Antibody; Future    2. Degeneration of cervical intervertebral disc  -     diclofenac (VOLTAREN) 75 MG EC tablet; Take 1 tablet by mouth 2 (Two) Times a Day for 30 days.  Dispense: 60 tablet; Refill: 3  -     Ambulatory Referral to Physical Therapy Evaluate and treat (3 days a week x 4-6 weeks); Stretching, Strengthening, ROM; Full weight bearing  -     Ambulatory Referral to Occupational Therapy  -     Ambulatory Referral to Pain Management  -     Aldolase; Future  -     ABIMBOLA; Future  -     Anti-DNA Antibody, Double-stranded; Future  -     Anti-Smith Antibody; Future  -     AST; Future  -     CK; Future  -     IgA; Future  -     HLA-B27 Antigen; Future  -     Lactate Dehydrogenase; Future  -     RHEUMATOID FACTOR; Future  -     Sjogrens Syndrome-A Extractable Nuclear Antibody; Future  -     Sjogrens Syndrome-B Extractable Nuclear Antibody; Future  -     XR Spine Cervical 2 View; Future    3. Overweight (BMI 25.0-29.9)    4. Chewing tobacco use    Other orders  -     Cancel: Antiphospholipid Syndrome; Future        Return for 6-8 weeks/after PT with [facility], with [facility] on day Dr. Bolivar in office.    Thank you for this Consultation and the opportunity to participate in Elmer's care.    Sincerely,  Alli Bolivar MD    Level of Risk: Moderate due to: undiagnosed new problem  MDM: Moderate Complexity  (Mod = 00390, High = 94699)

## 2021-02-01 NOTE — PATIENT INSTRUCTIONS
"PATIENT TO CONTINUE TO FOLLOW UP WITH HIS/HER PRIMARY CARE PROVIDER FOR YEARLY PHYSICAL EXAMS TO ENSURE COMPLETE HEALTH MAINTENANCE    DASH Eating Plan  DASH stands for \"Dietary Approaches to Stop Hypertension.\" The DASH eating plan is a healthy eating plan that has been shown to reduce high blood pressure (hypertension). It may also reduce your risk for type 2 diabetes, heart disease, and stroke. The DASH eating plan may also help with weight loss.  What are tips for following this plan?    General guidelines  · Avoid eating more than 2,300 mg (milligrams) of salt (sodium) a day. If you have hypertension, you may need to reduce your sodium intake to 1,500 mg a day.  · Limit alcohol intake to no more than 1 drink a day for nonpregnant women and 2 drinks a day for men. One drink equals 12 oz of beer, 5 oz of wine, or 1½ oz of hard liquor.  · Work with your health care provider to maintain a healthy body weight or to lose weight. Ask what an ideal weight is for you.  · Get at least 30 minutes of exercise that causes your heart to beat faster (aerobic exercise) most days of the week. Activities may include walking, swimming, or biking.  · Work with your health care provider or diet and nutrition specialist (dietitian) to adjust your eating plan to your individual calorie needs.  Reading food labels    · Check food labels for the amount of sodium per serving. Choose foods with less than 5 percent of the Daily Value of sodium. Generally, foods with less than 300 mg of sodium per serving fit into this eating plan.  · To find whole grains, look for the word \"whole\" as the first word in the ingredient list.  Shopping  · Buy products labeled as \"low-sodium\" or \"no salt added.\"  · Buy fresh foods. Avoid canned foods and premade or frozen meals.  Cooking  · Avoid adding salt when cooking. Use salt-free seasonings or herbs instead of table salt or sea salt. Check with your health care provider or pharmacist before using salt " substitutes.  · Do not acharya foods. Cook foods using healthy methods such as baking, boiling, grilling, and broiling instead.  · Cook with heart-healthy oils, such as olive, canola, soybean, or sunflower oil.  Meal planning  · Eat a balanced diet that includes:  ? 5 or more servings of fruits and vegetables each day. At each meal, try to fill half of your plate with fruits and vegetables.  ? Up to 6-8 servings of whole grains each day.  ? Less than 6 oz of lean meat, poultry, or fish each day. A 3-oz serving of meat is about the same size as a deck of cards. One egg equals 1 oz.  ? 2 servings of low-fat dairy each day.  ? A serving of nuts, seeds, or beans 5 times each week.  ? Heart-healthy fats. Healthy fats called Omega-3 fatty acids are found in foods such as flaxseeds and coldwater fish, like sardines, salmon, and mackerel.  · Limit how much you eat of the following:  ? Canned or prepackaged foods.  ? Food that is high in trans fat, such as fried foods.  ? Food that is high in saturated fat, such as fatty meat.  ? Sweets, desserts, sugary drinks, and other foods with added sugar.  ? Full-fat dairy products.  · Do not salt foods before eating.  · Try to eat at least 2 vegetarian meals each week.  · Eat more home-cooked food and less restaurant, buffet, and fast food.  · When eating at a restaurant, ask that your food be prepared with less salt or no salt, if possible.  What foods are recommended?  The items listed may not be a complete list. Talk with your dietitian about what dietary choices are best for you.  Grains  Whole-grain or whole-wheat bread. Whole-grain or whole-wheat pasta. Brown rice. Oatmeal. Quinoa. Bulgur. Whole-grain and low-sodium cereals. Leona bread. Low-fat, low-sodium crackers. Whole-wheat flour tortillas.  Vegetables  Fresh or frozen vegetables (raw, steamed, roasted, or grilled). Low-sodium or reduced-sodium tomato and vegetable juice. Low-sodium or reduced-sodium tomato sauce and tomato  paste. Low-sodium or reduced-sodium canned vegetables.  Fruits  All fresh, dried, or frozen fruit. Canned fruit in natural juice (without added sugar).  Meat and other protein foods  Skinless chicken or turkey. Ground chicken or turkey. Pork with fat trimmed off. Fish and seafood. Egg whites. Dried beans, peas, or lentils. Unsalted nuts, nut butters, and seeds. Unsalted canned beans. Lean cuts of beef with fat trimmed off. Low-sodium, lean deli meat.  Dairy  Low-fat (1%) or fat-free (skim) milk. Fat-free, low-fat, or reduced-fat cheeses. Nonfat, low-sodium ricotta or cottage cheese. Low-fat or nonfat yogurt. Low-fat, low-sodium cheese.  Fats and oils  Soft margarine without trans fats. Vegetable oil. Low-fat, reduced-fat, or light mayonnaise and salad dressings (reduced-sodium). Canola, safflower, olive, soybean, and sunflower oils. Avocado.  Seasoning and other foods  Herbs. Spices. Seasoning mixes without salt. Unsalted popcorn and pretzels. Fat-free sweets.  What foods are not recommended?  The items listed may not be a complete list. Talk with your dietitian about what dietary choices are best for you.  Grains  Baked goods made with fat, such as croissants, muffins, or some breads. Dry pasta or rice meal packs.  Vegetables  Creamed or fried vegetables. Vegetables in a cheese sauce. Regular canned vegetables (not low-sodium or reduced-sodium). Regular canned tomato sauce and paste (not low-sodium or reduced-sodium). Regular tomato and vegetable juice (not low-sodium or reduced-sodium). Pickles. Olives.  Fruits  Canned fruit in a light or heavy syrup. Fried fruit. Fruit in cream or butter sauce.  Meat and other protein foods  Fatty cuts of meat. Ribs. Fried meat. Nye. Sausage. Bologna and other processed lunch meats. Salami. Fatback. Hotdogs. Bratwurst. Salted nuts and seeds. Canned beans with added salt. Canned or smoked fish. Whole eggs or egg yolks. Chicken or turkey with skin.  Dairy  Whole or 2% milk,  cream, and half-and-half. Whole or full-fat cream cheese. Whole-fat or sweetened yogurt. Full-fat cheese. Nondairy creamers. Whipped toppings. Processed cheese and cheese spreads.  Fats and oils  Butter. Stick margarine. Lard. Shortening. Ghee. Nye fat. Tropical oils, such as coconut, palm kernel, or palm oil.  Seasoning and other foods  Salted popcorn and pretzels. Onion salt, garlic salt, seasoned salt, table salt, and sea salt. Worcestershire sauce. Tartar sauce. Barbecue sauce. Teriyaki sauce. Soy sauce, including reduced-sodium. Steak sauce. Canned and packaged gravies. Fish sauce. Oyster sauce. Cocktail sauce. Horseradish that you find on the shelf. Ketchup. Mustard. Meat flavorings and tenderizers. Bouillon cubes. Hot sauce and Tabasco sauce. Premade or packaged marinades. Premade or packaged taco seasonings. Relishes. Regular salad dressings.  Where to find more information:  · National Heart, Lung, and Blood [place]: www.nhlbi.nih.gov  · American Heart Association: www.heart.org  Summary  · The DASH eating plan is a healthy eating plan that has been shown to reduce high blood pressure (hypertension). It may also reduce your risk for type 2 diabetes, heart disease, and stroke.  · With the DASH eating plan, you should limit salt (sodium) intake to 2,300 mg a day. If you have hypertension, you may need to reduce your sodium intake to 1,500 mg a day.  · When on the DASH eating plan, aim to eat more fresh fruits and vegetables, whole grains, lean proteins, low-fat dairy, and heart-healthy fats.  · Work with your health care provider or diet and nutrition specialist (dietitian) to adjust your eating plan to your individual calorie needs.  This information is not intended to replace advice given to you by your health care provider. Make sure you discuss any questions you have with your health care provider.  Document Revised: 11/30/2018 Document Reviewed: 12/11/2017  Elsevier Patient Education © 2020 Elsevier  Inc[employer]      Steps to Quit Smoking  Smoking tobacco is the leading cause of preventable death. It can affect almost every organ in the body. Smoking puts you and those around you at risk for developing many serious chronic diseases. Quitting smoking can be difficult, but it is one of the best things that you can do for your health. It is never too late to quit.  How do I get ready to quit?  When you decide to quit smoking, create a plan to help you succeed. Before you quit:  · Pick a date to quit. Set a date within the next 2 weeks to give you time to prepare.  · Write down the reasons why you are quitting. Keep this list in places where you will see it often.  · Tell your family, friends, and co-workers that you are quitting. Support from your loved ones can make quitting easier.  · Talk with your health care provider about your options for quitting smoking.  · Find out what treatment options are covered by your health insurance.  · Identify people, places, things, and activities that make you want to smoke (triggers). Avoid them.  What first steps can I take to quit smoking?  · Throw away all cigarettes at home, at work, and in your car.  · Throw away smoking accessories, such as ashtrays and lighters.  · Clean your car. Make sure to empty the ashtray.  · Clean your home, including curtains and carpets.  What strategies can I use to quit smoking?  Talk with your health care provider about combining strategies, such as taking medicines while you are also receiving in-person counseling. Using these two strategies together makes you more likely to succeed in quitting than if you used either strategy on its own.  · If you are pregnant or breastfeeding, talk with your health care provider about finding counseling or other support strategies to quit smoking. Do not take medicine to help you quit smoking unless your health care provider tells you to do so.  To quit smoking:  Quit right away  · Quit smoking completely,  instead of gradually reducing how much you smoke over a period of time. Research shows that stopping smoking right away is more successful than gradually quitting.  · Attend in-person counseling to help you build problem-solving skills. You are more likely to succeed in quitting if you attend counseling sessions regularly. Even short sessions of 10 minutes can be effective.  Take medicine  You may take medicines to help you quit smoking. Some medicines require a prescription and some you can purchase over-the-counter. Medicines may have nicotine in them to replace the nicotine in cigarettes. Medicines may:  · Help to stop cravings.  · Help to relieve withdrawal symptoms.  Your health care provider may recommend:  · Nicotine patches, gum, or lozenges.  · Nicotine inhalers or sprays.  · Non-nicotine medicine that is taken by mouth.  Find resources  Find resources and support systems that can help you to quit smoking and remain smoke-free after you quit. These resources are most helpful when you use them often. They include:  · Online chats with a counselor.  · Telephone quitlines.  · Printed self-help materials.  · Support groups or group counseling.  · Text messaging programs.  · Mobile phone apps or applications. Use apps that can help you stick to your quit plan by providing reminders, tips, and encouragement. There are many free apps for mobile devices as well as websites. Examples include Quit Guide from the CDC and smokefree.gov  What things can I do to make it easier to quit?    · Reach out to your family and friends for support and encouragement. Call telephone quitlines (1-550-QUIT-NOW), reach out to support groups, or work with a counselor for support.  · Ask people who smoke to avoid smoking around you.  · Avoid places that trigger you to smoke, such as bars, parties, or smoke-break areas at work.  · Spend time with people who do not smoke.  · Lessen the stress in your life. Stress can be a smoking trigger  for some people. To lessen stress, try:  ? Exercising regularly.  ? Doing deep-breathing exercises.  ? Doing yoga.  ? Meditating.  ? Performing a body scan. This involves closing your eyes, scanning your body from head to toe, and noticing which parts of your body are particularly tense. Try to relax the muscles in those areas.  How will I feel when I quit smoking?  Day 1 to 3 weeks  Within the first 24 hours of quitting smoking, you may start to feel withdrawal symptoms. These symptoms are usually most noticeable 2-3 days after quitting, but they usually do not last for more than 2-3 weeks. You may experience these symptoms:  · Mood swings.  · Restlessness, anxiety, or irritability.  · Trouble concentrating.  · Dizziness.  · Strong cravings for sugary foods and nicotine.  · Mild weight gain.  · Constipation.  · Nausea.  · Coughing or a sore throat.  · Changes in how the medicines that you take for unrelated issues work in your body.  · Depression.  · Trouble sleeping (insomnia).  Week 3 and afterward  After the first 2-3 weeks of quitting, you may start to notice more positive results, such as:  · Improved sense of smell and taste.  · Decreased coughing and sore throat.  · Slower heart rate.  · Lower blood pressure.  · Clearer skin.  · The ability to breathe more easily.  · Fewer sick days.  Quitting smoking can be very challenging. Do not get discouraged if you are not successful the first time. Some people need to make many attempts to quit before they achieve long-term success. Do your best to stick to your quit plan, and talk with your health care provider if you have any questions or concerns.  Summary  · Smoking tobacco is the leading cause of preventable death. Quitting smoking is one of the best things that you can do for your health.  · When you decide to quit smoking, create a plan to help you succeed.  · Quit smoking right away, not slowly over a period of time.  · When you start quitting, seek help from  your health care provider, family, or friends.  This information is not intended to replace advice given to you by your health care provider. Make sure you discuss any questions you have with your health care provider.  Document Revised: 09/11/2020 Document Reviewed: 03/07/2020  Elsevier Patient Education © 2020 [employer]vier Inc.

## 2021-02-02 ENCOUNTER — TELEPHONE (OUTPATIENT)
Dept: NEUROSURGERY | Facility: CLINIC | Age: 71
End: 2021-02-02

## 2021-02-02 LAB
ALDOLASE SERPL-CCNC: 4 U/L (ref 3.3–10.3)
ANA SER QL: NEGATIVE
CHROMATIN AB SERPL-ACNC: <10 IU/ML (ref 0–14)
CK SERPL-CCNC: 106 U/L (ref 20–200)
DSDNA AB SER-ACNC: 3 IU/ML (ref 0–9)
ENA SM AB SER-ACNC: <0.2 AI (ref 0–0.9)
ENA SS-A AB SER-ACNC: <0.2 AI (ref 0–0.9)
ENA SS-B AB SER-ACNC: <0.2 AI (ref 0–0.9)
IGA1 MFR SER: 134 MG/DL (ref 70–400)
LDH SERPL-CCNC: 176 U/L (ref 135–225)

## 2021-02-05 LAB — HLA-B27 QL NAA+PROBE: NEGATIVE

## 2021-02-09 ENCOUNTER — TELEPHONE (OUTPATIENT)
Dept: NEUROSURGERY | Facility: CLINIC | Age: 71
End: 2021-02-09

## 2021-02-09 NOTE — TELEPHONE ENCOUNTER
----- Message from Alli Bolivar MD sent at 2/8/2021  7:55 AM CST -----  Let him know his rheumatology panel is negative.

## 2021-03-15 ENCOUNTER — OFFICE VISIT (OUTPATIENT)
Dept: NEUROSURGERY | Facility: CLINIC | Age: 71
End: 2021-03-15

## 2021-03-15 VITALS — WEIGHT: 167.4 LBS | BODY MASS INDEX: 27.86 KG/M2

## 2021-03-15 DIAGNOSIS — M50.30 DEGENERATION OF CERVICAL INTERVERTEBRAL DISC: ICD-10-CM

## 2021-03-15 DIAGNOSIS — M51.36 DEGENERATION OF LUMBAR INTERVERTEBRAL DISC: Primary | ICD-10-CM

## 2021-03-15 DIAGNOSIS — E66.3 OVERWEIGHT (BMI 25.0-29.9): ICD-10-CM

## 2021-03-15 DIAGNOSIS — Z72.0 CHEWING TOBACCO USE: ICD-10-CM

## 2021-03-15 PROCEDURE — 99213 OFFICE O/P EST LOW 20 MIN: CPT | Performed by: NURSE PRACTITIONER

## 2021-03-15 RX ORDER — DICLOFENAC SODIUM 75 MG/1
75 TABLET, DELAYED RELEASE ORAL 2 TIMES DAILY
COMMUNITY
Start: 2021-03-06 | End: 2021-04-12

## 2021-03-15 NOTE — PATIENT INSTRUCTIONS
Tobacco Use Disorder  Tobacco use disorder (TUD) occurs when a person craves, seeks, and uses tobacco, regardless of the consequences. This disorder can cause problems with mental and physical health. It can affect your ability to have healthy relationships, and it can keep you from meeting your responsibilities at work, home, or school.  Tobacco may be:  · Smoked as a cigarette or cigar.  · Inhaled using e-cigarettes.  · Smoked in a pipe or hookah.  · Chewed as smokeless tobacco.  · Inhaled into the nostrils as snuff.  Tobacco products contain a dangerous chemical called nicotine, which is very addictive. Nicotine triggers hormones that make the body feel stimulated and works on areas of the brain that make you feel good. These effects can make it hard for people to quit nicotine.  Tobacco contains many other unsafe chemicals that can damage almost every organ in the body. Smoking tobacco also puts others in danger due to fire risk and possible health problems caused by breathing in secondhand smoke.  What are the signs or symptoms?  Symptoms of TUD may include:  · Being unable to slow down or stop your tobacco use.  · Spending an abnormal amount of time getting or using tobacco.  · Craving tobacco. Cravings may last for up to 6 months after quitting.  · Tobacco use that:  ? Interferes with your work, school, or home life.  ? Interferes with your personal and social relationships.  ? Makes you give up activities that you once enjoyed or found important.  · Using tobacco even though you know that it is:  ? Dangerous or bad for your health or someone else's health.  ? Causing problems in your life.  · Needing more and more of the substance to get the same effect (developing tolerance).  · Experiencing unpleasant symptoms if you do not use the substance (withdrawal). Withdrawal symptoms may include:  ? Depressed, anxious, or irritable mood.  ? Difficulty concentrating.  ? Increased appetite.  ? Restlessness or trouble  sleeping.  · Using the substance to avoid withdrawal.  How is this diagnosed?  This condition may be diagnosed based on:  · Your current and past tobacco use. Your health care provider may ask questions about how your tobacco use affects your life.  · A physical exam.  You may be diagnosed with TUD if you have at least two symptoms within a 12-month period.  How is this treated?  This condition is treated by stopping tobacco use. Many people are unable to quit on their own and need help. Treatment may include:  · Nicotine replacement therapy (NRT). NRT provides nicotine without the other harmful chemicals in tobacco. NRT gradually lowers the dosage of nicotine in the body and reduces withdrawal symptoms. NRT is available as:  ? Over-the-counter gums, lozenges, and skin patches.  ? Prescription mouth inhalers and nasal sprays.  · Medicine that acts on the brain to reduce cravings and withdrawal symptoms.  · A type of talk therapy that examines your triggers for tobacco use, how to avoid them, and how to cope with cravings (behavioral therapy).  · Hypnosis. This may help with withdrawal symptoms.  · Joining a support group for others coping with TUD.  The best treatment for TUD is usually a combination of medicine, talk therapy, and support groups. Recovery can be a long process. Many people start using tobacco again after stopping (relapse). If you relapse, it does not mean that treatment will not work.  Follow these instructions at home:    Lifestyle  · Do not use any products that contain nicotine or tobacco, such as cigarettes and e-cigarettes.  · Avoid things that trigger tobacco use as much as you can. Triggers include people and situations that usually cause you to use tobacco.  · Avoid drinks that contain caffeine, including coffee. These may worsen some withdrawal symptoms.  · Find ways to manage stress. Wanting to smoke may cause stress, and stress can make you want to smoke. Relaxation techniques such as  deep breathing, meditation, and yoga may help.  · Attend support groups as needed. These groups are an important part of long-term recovery for many people.  General instructions  · Take over-the-counter and prescription medicines only as told by your health care provider.  · Check with your health care provider before taking any new prescription or over-the-counter medicines.  · Decide on a friend, family member, or smoking quit-line (such as 1-800-QUIT-NOW in the [place] that you can call or text when you feel the urge to smoke or when you need help coping with cravings.  · Keep all follow-up visits as told by your health care provider and therapist. This is important.  Contact a health care provider if:  · You are not able to take your medicines as prescribed.  · Your symptoms get worse, even with treatment.  Summary  · Tobacco use disorder (TUD) occurs when a person craves, seeks, and uses tobacco regardless of the consequences.  · This condition may be diagnosed based on your current and past tobacco use and a physical exam.  · Many people are unable to quit on their own and need help. Recovery can be a long process.  · The most effective treatment for TUD is usually a combination of medicine, talk therapy, and support groups.  This information is not intended to replace advice given to you by your health care provider. Make sure you discuss any questions you have with your health care provider.  Document Revised: 12/05/2018 Document Reviewed: 12/05/2018  Elsevier Patient Education © 2020 Elsevier Inc.      Advance Care Planning and Advance Directives     You make decisions on a daily basis - decisions about where you want to live, your career, your home, your life. Perhaps one of the most important decisions you face is your choice for future medical care. Take time to talk with your family and your healthcare team and start planning today.  Advance Care Planning is a process that can help you:  · Understand  possible future healthcare decisions in light of your own experiences  · Reflect on those decision in light of your goals and values  · Discuss your decisions with those closest to you and the healthcare professionals that care for you  · Make a plan by creating a document that reflects your wishes    Surrogate Decision Maker  In the event of a medical emergency, which has left you unable to communicate or to make your own decisions, you would need someone to make decisions for you.  It is important to discuss your preferences for medical treatment with this person while you are in good health.     Qualities of a surrogate decision maker:  • Willing to take on this role and responsibility  • Knows what you want for future medical care  • Willing to follow your wishes even if they don't agree with them  • Able to make difficult medical decisions under stressful circumstances    Advance Directives  These are legal documents you can create that will guide your healthcare team and decision maker(s) when needed. These documents can be stored in the electronic medical record.    · Living Will - a legal document to guide your care if you have a terminal condition or a serious illness and are unable to communicate. States vary by statute in document names/types, but most forms may include one or more of the following:        -  Directions regarding life-prolonging treatments        -  Directions regarding artificially provided nutrition/hydration        -  Choosing a healthcare decision maker        -  Direction regarding organ/tissue donation    · Durable Power of  for Healthcare - this document names an -in-fact to make medical decisions for you, but it may also allow this person to make personal and financial decisions for you. Please seek the advice of an  if you need this type of document.    **Advance Directives are not required and no one may discriminate against you if you do not sign  one.    Medical Orders  Many states allow specific forms/orders signed by your physician to record your wishes for medical treatment in your current state of health. This form, signed in personal communication with your physician, addresses resuscitation and other medical interventions that you may or may not want.      For more information or to schedule a time with a [facility] Advance Care Planning Facilitator contact: [email or web address] or call [phone] and someone will contact you directly.

## 2021-03-15 NOTE — PROGRESS NOTES
"Chief complaint:   Chief Complaint   Patient presents with   • Back Pain     Subjective     HPI: Interval History: Laureano Lai is a 70 y.o.  male who presents today for follow-up of lumbar back pain.  Mr. Saunders has done extremely well since we last saw him.  With a combination of physical therapy, injections which she received from his PCP, Dr. Awad, and use of diclofenac and gabapentin his lumbar back pain has resolved.  He denies lower extremity radicular pain, weakness, numbness, or tingling.  He additionally denies fevers, chills, night sweats, unexplained weight loss, saddle anesthesia, or bowel bladder dysfunction.  Overall, Mr. Saunders states, \"I'm doing great!\".  He rates the severity of his symptoms 0/10.  No additional concerns at this time.    Oswestry Disability Index = 8%   Score   Pain Intensity No pain-0   Personal Care Look after myself without pain-0   Lifting Only very light weights-4   Walking Walk any distance-0   Sitting Sit as long as I like-0   Standing Stand as long as I like-0   Sleeping Sleep is not disturbed-0   Sex Life (if applicable) My sex life is normal-1   Social Life Social life is normal-0   Traveling Travel without pain-0   (Evan et al, 1980)    SCORE INTERPRETATION OF THE OSWESTRY LBP DISABILITY QUESTIONNAIRE     0-20% Minimal disability.  Can cope with most ADLs. Usually no treatment is needed, apart from advice on lifting, sitting, posture, physical fitness, and diet.  In this group, some patients have particular difficulty with sitting and this may be important if their occupation is sedentary (, , etc.).    ROS  Review of Systems   Constitutional: Negative.    HENT: Negative.    Eyes: Negative.    Respiratory: Negative.    Cardiovascular: Negative.    Gastrointestinal: Negative.    Endocrine: Negative.    Genitourinary: Negative.    Musculoskeletal: Negative for back pain.   Skin: Negative.    Allergic/Immunologic: Negative.    Neurological: " Negative.    Hematological: Negative.    Psychiatric/Behavioral: Negative.    All other systems reviewed and are negative.    PFSH:  Past Medical History:   Diagnosis Date   • Allergic rhinitis    • Ankylosis    • CVA (cerebral vascular accident) (CMS/HCC)    • DDD (degenerative disc disease), lumbar    • GERD (gastroesophageal reflux disease)    • Hypercholesteremia    • Hypertension    • Radiculopathy, lumbar region    • Scalp psoriasis    • Vertigo      Past Surgical History:   Procedure Laterality Date   • COLONOSCOPY  09/01/2011    normal   • COLONOSCOPY N/A 8/27/2019    Procedure: COLONOSCOPY WITH ANESTHESIA;  Surgeon: Reuben Gruber DO;  Location: [facility] ENDOSCOPY;  Service: Gastroenterology   • ENDOSCOPY  06/19/2015    stable dos santos esophagus   • ENDOSCOPY N/A 8/29/2019    Procedure: ESOPHAGOGASTRODUODENOSCOPY WITH ANESTHESIA;  Surgeon: Reuben Gruber DO;  Location: [facility] ENDOSCOPY;  Service: Gastroenterology   • EYE SURGERY     • KNEE ARTHROSCOPY      right knee x 2   • NECK SURGERY     • ROTATOR CUFF REPAIR       Objective      Current Outpatient Medications   Medication Sig Dispense Refill   • aspirin 81 MG chewable tablet Chew 81 mg Daily.     • diclofenac (VOLTAREN) 75 MG EC tablet Take 75 mg by mouth 2 (Two) Times a Day.     • losartan-hydrochlorothiazide (HYZAAR) 50-12.5 MG per tablet Take 1 tablet by mouth Daily.  3   • NIFEdipine XL (PROCARDIA XL) 90 MG 24 hr tablet Take 90 mg by mouth Daily.     • omeprazole (PriLOSEC) 40 MG capsule Take 40 mg by mouth Daily.     • simvastatin (ZOCOR) 40 MG tablet Take 40 mg by mouth Every Night.       No current facility-administered medications for this visit.     Vital Signs  Wt 75.9 kg (167 lb 6.4 oz)   BMI 27.86 kg/m²   Physical Exam  Vitals and nursing note reviewed.   Constitutional:       General: He is not in acute distress.     Appearance: Normal appearance. He is well-developed, well-groomed and overweight. He is not ill-appearing,  toxic-appearing or diaphoretic.      Comments: BMI 27.86   HENT:      Head: Normocephalic and atraumatic.      Right Ear: Hearing normal.      Left Ear: Hearing normal.   Eyes:      Extraocular Movements: EOM normal.      Conjunctiva/sclera: Conjunctivae normal.      Pupils: Pupils are equal, round, and reactive to light.   Neck:      Trachea: Trachea normal.   Cardiovascular:      Rate and Rhythm: Normal rate and regular rhythm.   Pulmonary:      Effort: Pulmonary effort is normal. No tachypnea, bradypnea, accessory muscle usage or respiratory distress.   Abdominal:      Palpations: Abdomen is soft.   Musculoskeletal:      Cervical back: Full passive range of motion without pain and neck supple.   Skin:     General: Skin is warm and dry.   Neurological:      Mental Status: He is alert and oriented to person, place, and time.      GCS: GCS eye subscore is 4. GCS verbal subscore is 5. GCS motor subscore is 6.      Gait: Gait is intact.      Deep Tendon Reflexes:      Reflex Scores:       Tricep reflexes are 1+ on the right side and 1+ on the left side.       Bicep reflexes are 1+ on the right side and 1+ on the left side.       Brachioradialis reflexes are 1+ on the right side and 1+ on the left side.       Patellar reflexes are 2+ on the right side and 2+ on the left side.       Achilles reflexes are 2+ on the right side and 2+ on the left side.  Psychiatric:         Speech: Speech normal.         Behavior: Behavior normal. Behavior is cooperative.       Neurologic Exam     Mental Status   Oriented to person, place, and time.   Attention: normal. Concentration: normal.   Speech: speech is normal   Level of consciousness: alert    Cranial Nerves     CN II   Visual fields full to confrontation.     CN III, IV, VI   Pupils are equal, round, and reactive to light.  Extraocular motions are normal.     CN V   Facial sensation intact.     CN VII   Facial expression full, symmetric.     CN VIII   CN VIII normal.     CN IX,  X   CN IX normal.     CN XI   CN XI normal.     Motor Exam   Right arm tone: normal  Left arm tone: normal  Right leg tone: normal  Left leg tone: normal    Strength   Right deltoid: 5/5  Left deltoid: 5/5  Right biceps: 5/5  Left biceps: 5/5  Right triceps: 5/5  Left triceps: 5/5  Right wrist extension: 5/5  Left wrist extension: 5/5  Right iliopsoas: 5/5  Left iliopsoas: 5/5  Right quadriceps: 5/5  Left quadriceps: 5/5  Right anterior tibial: 5/5  Left anterior tibial: 5/5  Right gastroc: 5/5  Left gastroc: 5/5  Right EHL 5/5  Left EHL 5/5       Sensory Exam   Right arm light touch: normal  Left arm light touch: normal  Right leg light touch: normal  Left leg light touch: normal    Gait, Coordination, and Reflexes     Gait  Gait: normal    Tremor   Resting tremor: absent  Intention tremor: absent  Action tremor: absent    Reflexes   Right brachioradialis: 1+  Left brachioradialis: 1+  Right biceps: 1+  Left biceps: 1+  Right triceps: 1+  Left triceps: 1+  Right patellar: 2+  Left patellar: 2+  Right achilles: 2+  Left achilles: 2+  Right Storey: absent  Left Storey: absent  Right ankle clonus: absent  Left ankle clonus: absent  Right pendular knee jerk: absent  Left pendular knee jerk: absent   (12 bullet pts)    Male  strength (pounds)  AGE Right Hand RH Norms Left Hand LH Norms   20-24  121+20.6  104+21.8   25-29  120+23.0  110+16.2   30-34  121+22.4  110+21.7   35-39  119+24  113+21.7   40-44  117+20.7  112+18.7   45-49  110+23.0  101+22.8   50-54  113+18.1  102+17   55-59  101+26.7  83+23.4   60-64  90+20.4  77+20.3   65-69 79, 90 91+20.6 98, 89 76.8+19.8   70-74  75+21.5  65+18.1   75+  66+21.0  55+17.0   (RUDOLPH Bender et al; Hand Dynometer: Effects of trials and sessions.  Perpetual and Motor Skills 61:195-8, 1985)  * = Dominant hand  > = Intervention    Results Review:        Noncontrast MRI of the lumbar spine is reviewed.  No evidence of significant central stenosis.  Disc height preserved.  Bridging  osteophytes are noted anterior at all levels of the lumbar spine.  Report reviewed and suggestive of severe L5/S1 foraminal stenosis right greater than left.  Patient's complaints are more significant for left greater than right     Assessment/Plan: Laureano Lai is a 70 y.o. male with a significant comorbidity of ankylosis, CVA, degenerative joint disease, hypertension, vertigo, and prior history of C2-C5 anterior cervical discectomy and fusion by Dr. Spring 2012.  He presents today for follow-up of mechanical low back pain.  RALPH: 62, 8.   Physical exam findings of improved right  strength, currently within age-matched controls, normal lower extremity reflexes, and a normal gait.  His  prior imaging shows no clinically significant central or foraminal stenosis of bridging osteophytes throughout all level of his lumbar spine.    Recommendations:  Status post C2-5 ACDF (Saw '12)  Mr. Saunders currently denies neck or upper extremity radicular pain, weakness, numbness, or tingling.  His right  strength is improved from his previous encounter from 79-90, and currently within age-matched controls.  X-rays of the cervical spine obtained on 2/1/2021 show a stable appearing ACDF from C3-C6, multilevel facet arthropathy without signs of adjacent segment disease.  Currently, no evidence of myelopathy or pathologic reflexes.    Mechanical axial Lumbar Pain:    Define his back pain without significant radiculopathy or weakness.  This can include referred pain radiating down posterior thighs without decent below the knees.      Differential diagnosis: Acute (<6 weeks) most typically trauma related, compression fracture, myofascial pain, drug induced myalgias (statins, Neulasta, or phosphodiesterase type V inhibitors such as tadalafil). Subacute (6 weeks - 3 months):  Infectious discitis, vertebral osteomyelitis, spinal epidural abscess, spinal tumor (intradural or extradural), multiple myeloma, sacroiliitis.  Chronic (>3 months):  lumbar stenosis, spondylolisthesis with mechanical instability, degenerative facet arthropathy, coronal or sagittal spinal malalignment, failed back syndrome after surgery, flat back syndrome. Spondyloarthropathies including ankylosis spondylitis (HLA-B27), Paget's disease.  Fibromyalgia or other rheumatological disease. Malingering, conversion disorder and secondary gain are diagnoses of exclusion.     Per Dr. Bolivar's previous note:  Laureano's signs and symptoms are most concerning for ankylosing spondylitis versus facet mediated arthropathy given his symptoms of back pain worsened with movement and signs of intact neurological exam.  Furthermore his imaging shows no evidence of convincing central or foraminal stenosis but evidence of bridging osteophytes on his MRI.      Mr. Saunders has done extremely well since we last saw him.  He currently denies lumbar back or lower extremity radicular pain, weakness, numbness, or tingling.  Per his most recent counter, his rheumatology panel was normal.  Additionally, per Dr. Bolivar, his MRI showed degenerative changes without evidence of convincing central or foraminal stenosis.  Given his resolution of lumbar back pain, I recommended Mr. Saunders either return in 6 months for reassessment or to call to return for any new or additional concerns.  Mr. Saunders has elected to call to return as needed for new or worsening complaints of weakness, paresthesias, gait disturbances, or any additional concerns.  Mr. Saunders agrees with this plan of care.    Tobacco abuse  The patient understands the many dangers of continuing to use tobacco. Despite this, Mr. Saunders states quitting is not an immediate priority at this time and declines to discuss tobacco cessation.  I reminded the patient that if quitting becomes an increased priority to contact us for help with quitting.       Overweight: 25.0-29.9kg/m2   Body mass index is 27.86 kg/m².  Information on the DASH diet  provided in the AVS.  We will continue to provided diet and exercise information with the goal of weight loss at each scheduled appointment.     Diagnoses and all orders for this visit:    1. Degeneration of lumbar intervertebral disc (Primary)    2. Degeneration of cervical intervertebral disc    3. Chewing tobacco use    4. Overweight (BMI 25.0-29.9)      Return if symptoms worsen or fail to improve.    Level of Risk: Low due to:  one stable chronic illnesss  MDM: Low  (Mod = 38759, High = 32743)    Thank you, for allowing me to continue to participate in the care of this patient.    Sincerely,  ALY Gleason

## 2021-04-12 RX ORDER — DICLOFENAC SODIUM 75 MG/1
TABLET, DELAYED RELEASE ORAL
Qty: 60 TABLET | Refills: 3 | Status: SHIPPED | OUTPATIENT
Start: 2021-04-12 | End: 2021-08-18

## 2021-08-18 DIAGNOSIS — M51.36 DEGENERATION OF LUMBAR INTERVERTEBRAL DISC: Primary | ICD-10-CM

## 2021-08-18 DIAGNOSIS — M50.30 DEGENERATION OF CERVICAL INTERVERTEBRAL DISC: ICD-10-CM

## 2021-08-18 RX ORDER — DICLOFENAC SODIUM 75 MG/1
TABLET, DELAYED RELEASE ORAL
Qty: 60 TABLET | Refills: 0 | Status: SHIPPED | OUTPATIENT
Start: 2021-08-18

## 2022-08-26 NOTE — PROGRESS NOTES
Chief Complaint   Patient presents with   • Endoscopy     8-29-19 endo dos santos;s       PCP: Oneil Awad MD  REFER: No ref. provider found    Subjective     HPI    Laureano Lai is a 72 y.o. male who presents with known history of  Dos Santos's Esophagus diagnosed over 8 years ago and GERD.   Heartburn and indigestion described as stable.   Experiences heartburn during night occasionally.   He is maintained on Omeprazole daily (at ).  Last EGD from 2019 .  He does not have complaints of :N/V, no changes in bowels, no wt loss, no BRBPR.  No melena.  No abdominal pain.   No dysphagia.        PREVIOUS PROCEDURES:    UPPER GI ENDOSCOPY (08/29/2019 08:09)  Tissue Pathology Exam (08/29/2019 08:53)-positive intestinal metaplasia, no dysplasia    ENDOSCOPY, INT (06/19/2015 00:00)    COLONOSCOPY (08/27/2019 08:21)  Tissue Pathology Exam (08/29/2019 08:53)- (5 years)    SCANNED - COLONOSCOPY (09/01/2011 00:00)        Past Medical History:   Diagnosis Date   • Allergic rhinitis    • Ankylosis    • CVA (cerebral vascular accident) (HCC)    • DDD (degenerative disc disease), lumbar    • GERD (gastroesophageal reflux disease)    • Hypercholesteremia    • Hypertension    • Radiculopathy, lumbar region    • Scalp psoriasis    • Vertigo      Past Surgical History:   Procedure Laterality Date   • COLONOSCOPY  09/01/2011    normal   • COLONOSCOPY N/A 8/27/2019    Procedure: COLONOSCOPY WITH ANESTHESIA;  Surgeon: Reuben Gruber DO;  Location: [facility] ENDOSCOPY;  Service: Gastroenterology   • ENDOSCOPY  06/19/2015    stable dos santos esophagus   • ENDOSCOPY N/A 8/29/2019    Procedure: ESOPHAGOGASTRODUODENOSCOPY WITH ANESTHESIA;  Surgeon: Reuben Gruber DO;  Location: [facility] ENDOSCOPY;  Service: Gastroenterology   • EYE SURGERY     • KNEE ARTHROSCOPY      right knee x 2   • NECK SURGERY     • ROTATOR CUFF REPAIR       Outpatient Medications Marked as Taking for the 8/29/22 encounter (Office Visit) with Zoran Perez  "ALY Sheffield   Medication Sig Dispense Refill   • aspirin 81 MG chewable tablet Chew 81 mg Daily.     • diclofenac (VOLTAREN) 75 MG EC tablet TAKE ONE TABLET BY MOUTH TWICE DAILY 60 tablet 0   • losartan-hydrochlorothiazide (HYZAAR) 50-12.5 MG per tablet Take 1 tablet by mouth Daily.  3   • NIFEdipine XL (PROCARDIA XL) 90 MG 24 hr tablet Take 90 mg by mouth Daily.     • omeprazole (PriLOSEC) 40 MG capsule Take 40 mg by mouth Daily.     • simvastatin (ZOCOR) 40 MG tablet Take 40 mg by mouth Every Night.       Allergies   Allergen Reactions   • Penicillins Hives   • Terramycin [Oxytetracycline] Hives   • Codeine Irritability   • Influenza Vaccines GI Intolerance   • Lipitor [Atorvastatin] Myalgia   • Mycinaire Nasal Mist [Sodium Chloride] Unknown - Low Severity     Social History     Socioeconomic History   • Marital status:    Tobacco Use   • Smoking status: Former Smoker     Types: Cigarettes   • Smokeless tobacco: Current User     Types: Chew   Vaping Use   • Vaping Use: Never used   Substance and Sexual Activity   • Alcohol use: No   • Drug use: No   • Sexual activity: Defer     Review of Systems   Constitutional: Negative for unexpected weight change.   Respiratory: Negative for shortness of breath.    Cardiovascular: Negative for chest pain.   Gastrointestinal: Negative for abdominal pain and anal bleeding.     Objective   Vitals:    08/29/22 0844   BP: 124/80   Pulse: 70   Temp: 98 °F (36.7 °C)   SpO2: 98%   Weight: 73 kg (161 lb)   Height: 165.1 cm (65\")     Physical Exam  Constitutional:       Appearance: Normal appearance. He is well-developed.   Eyes:      General: No scleral icterus.  Cardiovascular:      Rate and Rhythm: Regular rhythm.      Heart sounds: Normal heart sounds. No murmur heard.  Pulmonary:      Effort: Pulmonary effort is normal. No accessory muscle usage.      Breath sounds: Normal breath sounds.   Abdominal:      General: Bowel sounds are normal. There is no distension.      " "Palpations: Abdomen is soft. There is no mass.      Tenderness: There is no abdominal tenderness. There is no guarding or rebound.   Skin:     General: Skin is warm and dry.      Coloration: Skin is not jaundiced.   Neurological:      Mental Status: He is alert.   Psychiatric:         Behavior: Behavior is cooperative.       Imaging Results (Most Recent)     None        Body mass index is 26.79 kg/m².  Assessment & Plan   Diagnoses and all orders for this visit:    1. Gastroesophageal reflux disease, unspecified whether esophagitis present (Primary)  -     Case Request; Standing  -     Case Request    2. Michel's esophagus determined by biopsy        ESOPHAGOGASTRODUODENOSCOPY WITH ANESTHESIA (N/A)    Recommend he consistently take PPI medication prior to evening meal.  Also recommend no eating or drinking within 2-3 hours of lying down.  EGD in 4 weeks to allow Laureano Lai to consistently take PPI prior to evening  Meal.  If biopsy are unremarkable he can resume PPI medication as he has been taking (in evening).  However, if there are signs of acid reflux per EGD or biopsy would recommend changing timing of PPI to morning.   I explained we recommend PPI be taken prior to eating or drinking in the morning, since Laureano Lai reports he has taken his PPI in the evening for \"as long as he can remember\" we will leave medication as is until EGD assessment      Take PPI 30 min prior to evening meal  Decrease caffeine, nicotine, etoh- all contribute to acid reflux  Do not eat 2-3 hours within lying down, elevated HOB 4-6 inches      Laureano Lai is tolerating omeprazole therapy without difficulty.  Symptoms are controlled with current PPI medication and dose.  I recommend he continue omeprazole at current dose.     Colonoscopy is up to date, procedure can be performed earlier if clinically indicated     The risks of the endoscopy were discussed in detail.  We discussed the risks of perforation (one out of " [id number], riskier with dilation), bleeding (one out of 500), and the rare risks of infection, adverse reaction to anesthesia, respiratory failure, cardiac failure including MI and adverse reaction to medications, etc.  We discussed consequences that could occur if a risk were to develop such as the need for hospitalization, blood transfusion, surgical intervention, medications, pain and disability and death.  Alternatives include not doing anything, or pursuing an UGI series which only offers a diagnosis with potential less accuracy compared to EGD.  The patient verbalizes understanding and agrees to proceed.  Precautions are currently being put in place due to COVID-19.  I have explained to Laureano Lai they will be required to undergo COVID testing prior to their EGD.  Laureano Lai verbalized understanding and was willing to proceed.         Zoran Perez, APRN  08/29/22      Gastroesophageal Reflux Disease, Adult    Gastroesophageal reflux disease (GERD) happens when acid from your stomach flows up into the esophagus. When acid comes in contact with the esophagus, the acid causes soreness (inflammation) in the esophagus. Over time, GERD may create small holes (ulcers) in the lining of the esophagus.  CAUSES    · Increased body weight. This puts pressure on the stomach, making acid rise from the stomach into the esophagus.  · Smoking. This increases acid production in the stomach.  · Drinking alcohol. This causes decreased pressure in the lower esophageal sphincter (valve or ring of muscle between the esophagus and stomach), allowing acid from the stomach into the esophagus.  · Late evening meals and a full stomach. This increases pressure and acid production in the stomach.  · A malformed lower esophageal sphincter.  Sometimes, no cause is found.  SYMPTOMS    · Burning pain in the lower part of the mid-chest behind the breastbone and in the mid-stomach area. This may occur twice a week or more  often.  · Trouble swallowing.  · Sore throat.  · Dry cough.  · Asthma-like symptoms including chest tightness, shortness of breath, or wheezing.  DIAGNOSIS    Your caregiver may be able to diagnose GERD based on your symptoms. In some cases, X-rays and other tests may be done to check for complications or to check the condition of your stomach and esophagus.  TREATMENT    Your caregiver may recommend over-the-counter or prescription medicines to help decrease acid production. Ask your caregiver before starting or adding any new medicines.    HOME CARE INSTRUCTIONS    · Change the factors that you can control. Ask your caregiver for guidance concerning weight loss, quitting smoking, and alcohol consumption.  · Avoid foods and drinks that make your symptoms worse, such as:  ¨ Caffeine or alcoholic drinks.  ¨ Chocolate.  ¨ Peppermint or mint flavorings.  ¨ Garlic and onions.  ¨ Spicy foods.  ¨ Citrus fruits, such as oranges, paulino, or limes.  ¨ Tomato-based foods such as sauce, chili, salsa, and pizza.  ¨ Fried and fatty foods.  · Avoid lying down for the 3 hours prior to your bedtime or prior to taking a nap.  · Eat small, frequent meals instead of large meals.  · Wear loose-fitting clothing. Do not wear anything tight around your waist that causes pressure on your stomach.  · Raise the head of your bed 6 to 8 inches with wood blocks to help you sleep. Extra pillows will not help.  · Only take over-the-counter or prescription medicines for pain, discomfort, or fever as directed by your caregiver.  · Do not take aspirin, ibuprofen, or other nonsteroidal anti-inflammatory drugs (NSAIDs).  SEEK IMMEDIATE MEDICAL CARE IF:    · You have pain in your arms, neck, jaw, teeth, or back.  · Your pain increases or changes in intensity or duration.  · You develop nausea, vomiting, or sweating (diaphoresis).  · You develop shortness of breath, or you faint.  · Your vomit is green, yellow, black, or looks like coffee grounds or  blood.  · Your stool is red, bloody, or black.  These symptoms could be signs of other problems, such as heart disease, gastric bleeding, or esophageal bleeding.  MAKE SURE YOU:    · Understand these instructions.  · Will watch your condition.  · Will get help right away if you are not doing well or get worse.     This information is not intended to replace advice given to you by your health care provider. Make sure you discuss any questions you have with your health care provider.     Document Released: 09/27/2006 Document Revised: 01/08/2016 Document Reviewed: 04/13/2016  [employer] Interactive Patient Education ©2016 [employer]

## 2022-08-29 ENCOUNTER — OFFICE VISIT (OUTPATIENT)
Dept: GASTROENTEROLOGY | Facility: CLINIC | Age: 72
End: 2022-08-29

## 2022-08-29 VITALS
DIASTOLIC BLOOD PRESSURE: 80 MMHG | WEIGHT: 161 LBS | HEART RATE: 70 BPM | BODY MASS INDEX: 26.82 KG/M2 | HEIGHT: 65 IN | SYSTOLIC BLOOD PRESSURE: 124 MMHG | TEMPERATURE: 98 F | OXYGEN SATURATION: 98 %

## 2022-08-29 DIAGNOSIS — K22.70 BARRETT'S ESOPHAGUS DETERMINED BY BIOPSY: ICD-10-CM

## 2022-08-29 DIAGNOSIS — K21.9 GASTROESOPHAGEAL REFLUX DISEASE, UNSPECIFIED WHETHER ESOPHAGITIS PRESENT: Primary | ICD-10-CM

## 2022-08-29 PROCEDURE — 99214 OFFICE O/P EST MOD 30 MIN: CPT | Performed by: NURSE PRACTITIONER

## 2022-08-29 NOTE — PATIENT INSTRUCTIONS
Gastroesophageal Reflux Disease, Adult    Gastroesophageal reflux disease (GERD) happens when acid from your stomach flows up into the esophagus. When acid comes in contact with the esophagus, the acid causes soreness (inflammation) in the esophagus. Over time, GERD may create small holes (ulcers) in the lining of the esophagus.  CAUSES    Increased body weight. This puts pressure on the stomach, making acid rise from the stomach into the esophagus.  Smoking. This increases acid production in the stomach.  Drinking alcohol. This causes decreased pressure in the lower esophageal sphincter (valve or ring of muscle between the esophagus and stomach), allowing acid from the stomach into the esophagus.  Late evening meals and a full stomach. This increases pressure and acid production in the stomach.  A malformed lower esophageal sphincter.  Sometimes, no cause is found.  SYMPTOMS    Burning pain in the lower part of the mid-chest behind the breastbone and in the mid-stomach area. This may occur twice a week or more often.  Trouble swallowing.  Sore throat.  Dry cough.  Asthma-like symptoms including chest tightness, shortness of breath, or wheezing.  DIAGNOSIS    Your caregiver may be able to diagnose GERD based on your symptoms. In some cases, X-rays and other tests may be done to check for complications or to check the condition of your stomach and esophagus.  TREATMENT    Your caregiver may recommend over-the-counter or prescription medicines to help decrease acid production. Ask your caregiver before starting or adding any new medicines.    HOME CARE INSTRUCTIONS    Change the factors that you can control. Ask your caregiver for guidance concerning weight loss, quitting smoking, and alcohol consumption.  Avoid foods and drinks that make your symptoms worse, such as:  Caffeine or alcoholic drinks.  Chocolate.  Peppermint or mint flavorings.  Garlic and onions.  Spicy foods.  Citrus fruits, such as oranges, paulino, or  limes.  Tomato-based foods such as sauce, chili, salsa, and pizza.  Fried and fatty foods.  Avoid lying down for the 3 hours prior to your bedtime or prior to taking a nap.  Eat small, frequent meals instead of large meals.  Wear loose-fitting clothing. Do not wear anything tight around your waist that causes pressure on your stomach.  Raise the head of your bed 6 to 8 inches with wood blocks to help you sleep. Extra pillows will not help.  Only take over-the-counter or prescription medicines for pain, discomfort, or fever as directed by your caregiver.  Do not take aspirin, ibuprofen, or other nonsteroidal anti-inflammatory drugs (NSAIDs).  SEEK IMMEDIATE MEDICAL CARE IF:    You have pain in your arms, neck, jaw, teeth, or back.  Your pain increases or changes in intensity or duration.  You develop nausea, vomiting, or sweating (diaphoresis).  You develop shortness of breath, or you faint.  Your vomit is green, yellow, black, or looks like coffee grounds or blood.  Your stool is red, bloody, or black.  These symptoms could be signs of other problems, such as heart disease, gastric bleeding, or esophageal bleeding.  MAKE SURE YOU:    Understand these instructions.  Will watch your condition.  Will get help right away if you are not doing well or get worse.     This information is not intended to replace advice given to you by your health care provider. Make sure you discuss any questions you have with your health care provider.     Document Released: 09/27/2006 Document Revised: 01/08/2016 Document Reviewed: 04/13/2016  [employer] Interactive Patient Education ©2016 [employer]

## 2022-08-30 PROBLEM — K21.9 GASTROESOPHAGEAL REFLUX DISEASE: Status: ACTIVE | Noted: 2022-08-30

## 2022-09-26 ENCOUNTER — ANESTHESIA (OUTPATIENT)
Dept: GASTROENTEROLOGY | Facility: HOSPITAL | Age: 72
End: 2022-09-26

## 2022-09-26 ENCOUNTER — ANESTHESIA EVENT (OUTPATIENT)
Dept: GASTROENTEROLOGY | Facility: HOSPITAL | Age: 72
End: 2022-09-26

## 2022-09-26 ENCOUNTER — HOSPITAL ENCOUNTER (OUTPATIENT)
Facility: HOSPITAL | Age: 72
Setting detail: HOSPITAL OUTPATIENT SURGERY
Discharge: HOME | End: 2022-09-26
Attending: INTERNAL MEDICINE | Admitting: INTERNAL MEDICINE

## 2022-09-26 VITALS
TEMPERATURE: 97.5 F | BODY MASS INDEX: 26.66 KG/M2 | DIASTOLIC BLOOD PRESSURE: 88 MMHG | RESPIRATION RATE: 16 BRPM | HEART RATE: 69 BPM | WEIGHT: 160 LBS | OXYGEN SATURATION: 95 % | SYSTOLIC BLOOD PRESSURE: 138 MMHG | HEIGHT: 65 IN

## 2022-09-26 DIAGNOSIS — K21.9 GASTROESOPHAGEAL REFLUX DISEASE, UNSPECIFIED WHETHER ESOPHAGITIS PRESENT: ICD-10-CM

## 2022-09-26 PROCEDURE — 88305 TISSUE EXAM BY PATHOLOGIST: CPT | Performed by: INTERNAL MEDICINE

## 2022-09-26 PROCEDURE — 25010000002 PROPOFOL 10 MG/ML EMULSION: Performed by: NURSE ANESTHETIST, CERTIFIED REGISTERED

## 2022-09-26 PROCEDURE — 43239 EGD BIOPSY SINGLE/MULTIPLE: CPT | Performed by: INTERNAL MEDICINE

## 2022-09-26 RX ORDER — SODIUM CHLORIDE 0.9 % (FLUSH) 0.9 %
10 SYRINGE (ML) INJECTION AS NEEDED
Status: DISCONTINUED | OUTPATIENT
Start: 2022-09-26 | End: 2022-09-26 | Stop reason: HOSPADM

## 2022-09-26 RX ORDER — LIDOCAINE HYDROCHLORIDE 20 MG/ML
INJECTION, SOLUTION EPIDURAL; INFILTRATION; INTRACAUDAL; PERINEURAL AS NEEDED
Status: DISCONTINUED | OUTPATIENT
Start: 2022-09-26 | End: 2022-09-26 | Stop reason: SURG

## 2022-09-26 RX ORDER — PROPOFOL 10 MG/ML
VIAL (ML) INTRAVENOUS AS NEEDED
Status: DISCONTINUED | OUTPATIENT
Start: 2022-09-26 | End: 2022-09-26 | Stop reason: SURG

## 2022-09-26 RX ORDER — SODIUM CHLORIDE 9 MG/ML
500 INJECTION, SOLUTION INTRAVENOUS CONTINUOUS PRN
Status: DISCONTINUED | OUTPATIENT
Start: 2022-09-26 | End: 2022-09-26 | Stop reason: HOSPADM

## 2022-09-26 RX ORDER — LIDOCAINE HYDROCHLORIDE 10 MG/ML
0.5 INJECTION, SOLUTION EPIDURAL; INFILTRATION; INTRACAUDAL; PERINEURAL ONCE AS NEEDED
Status: CANCELLED | OUTPATIENT
Start: 2022-09-26

## 2022-09-26 RX ADMIN — PROPOFOL 100 MG: 10 INJECTION, EMULSION INTRAVENOUS at 07:38

## 2022-09-26 RX ADMIN — LIDOCAINE HYDROCHLORIDE 100 MG: 20 INJECTION, SOLUTION EPIDURAL; INFILTRATION; INTRACAUDAL; PERINEURAL at 07:38

## 2022-09-26 RX ADMIN — SODIUM CHLORIDE 500 ML: 9 INJECTION, SOLUTION INTRAVENOUS at 07:16

## 2022-09-26 NOTE — ANESTHESIA POSTPROCEDURE EVALUATION
Patient: Laureano Howard    Procedure Summary     Date: 09/26/22 Room / Location: [facility] ENDOSCOPY 4 / [facility] ENDOSCOPY    Anesthesia Start: 0737 Anesthesia Stop: 0746    Procedure: ESOPHAGOGASTRODUODENOSCOPY WITH ANESTHESIA (N/A ) Diagnosis:       Gastroesophageal reflux disease, unspecified whether esophagitis present      (Gastroesophageal reflux disease, unspecified whether esophagitis present [K21.9])    Surgeons: Reuben Gruber DO Provider: Augustin Still CRNA    Anesthesia Type: MAC ASA Status: 2          Anesthesia Type: MAC    Vitals  Vitals Value Taken Time   BP     Temp     Pulse 70 09/26/22 0746   Resp     SpO2 93 % 09/26/22 0746   Vitals shown include unvalidated device data.        Post Anesthesia Care and Evaluation    Patient location during evaluation: PHASE II  Patient participation: complete - patient participated  Level of consciousness: awake and alert  Pain score: 0  Pain management: adequate    Airway patency: patent  Anesthetic complications: No anesthetic complications  PONV Status: none  Cardiovascular status: acceptable and stable  Respiratory status: acceptable  Hydration status: acceptable

## 2022-09-26 NOTE — ANESTHESIA PREPROCEDURE EVALUATION
Anesthesia Evaluation     Patient summary reviewed   history of anesthetic complications: PONV    NPO Liquid Status: > 8 hours           Airway   Mallampati: I  TM distance: >3 FB  Neck ROM: full  No difficulty expected  Dental - normal exam     Pulmonary - normal exam   (-) pneumonia  Cardiovascular - normal exam  Exercise tolerance: poor (<4 METS)    (+) hypertension well controlled 2 medications or greater, hyperlipidemia,       Neuro/Psych  (+) CVA, dizziness/light headedness, syncope, numbness,    GI/Hepatic/Renal/Endo    (+)  GERD well controlled,      Musculoskeletal     Abdominal  - normal exam   Substance History - negative use     OB/GYN          Other   arthritis,                      Anesthesia Plan    ASA 2     MAC     intravenous induction     Anesthetic plan, risks, benefits, and alternatives have been provided, discussed and informed consent has been obtained with: patient.        CODE STATUS:

## 2022-09-27 ENCOUNTER — TELEPHONE (OUTPATIENT)
Dept: GASTROENTEROLOGY | Facility: CLINIC | Age: 72
End: 2022-09-27

## 2022-09-27 LAB
CYTO UR: NORMAL
LAB AP CASE REPORT: NORMAL
Lab: NORMAL
PATH REPORT.FINAL DX SPEC: NORMAL
PATH REPORT.GROSS SPEC: NORMAL

## 2022-09-28 ENCOUNTER — TELEPHONE (OUTPATIENT)
Dept: GASTROENTEROLOGY | Facility: CLINIC | Age: 72
End: 2022-09-28

## 2022-09-28 NOTE — TELEPHONE ENCOUNTER
----- Message from Reuben Gruber DO sent at 9/28/2022 12:22 PM CDT -----  Please send letter stating biopsy for Michel's esophagus was stable, EGD recommended for 3 years    Thank you        ----- Message -----  From: Lab, Background User  Sent: 9/27/2022   2:00 PM CDT  To: Reuben Gruber DO

## 2023-02-16 ENCOUNTER — TRANSCRIBE ORDERS (OUTPATIENT)
Dept: ADMINISTRATIVE | Facility: HOSPITAL | Age: 73
End: 2023-02-16
Payer: MEDICARE

## 2023-02-16 DIAGNOSIS — R07.2 PRECORDIAL PAIN: Primary | ICD-10-CM

## 2023-03-02 ENCOUNTER — HOSPITAL ENCOUNTER (OUTPATIENT)
Dept: CARDIOLOGY | Facility: HOSPITAL | Age: 73
Discharge: HOME | End: 2023-03-02
Admitting: FAMILY MEDICINE
Payer: MEDICARE

## 2023-03-02 VITALS
HEART RATE: 65 BPM | HEIGHT: 65 IN | DIASTOLIC BLOOD PRESSURE: 79 MMHG | SYSTOLIC BLOOD PRESSURE: 127 MMHG | BODY MASS INDEX: 26.66 KG/M2 | WEIGHT: 160 LBS

## 2023-03-02 DIAGNOSIS — R07.2 PRECORDIAL PAIN: ICD-10-CM

## 2023-03-02 PROCEDURE — 93352 ADMIN ECG CONTRAST AGENT: CPT | Performed by: INTERNAL MEDICINE

## 2023-03-02 PROCEDURE — 25510000001 PERFLUTREN 6.52 MG/ML SUSPENSION: Performed by: INTERNAL MEDICINE

## 2023-03-02 PROCEDURE — 93017 CV STRESS TEST TRACING ONLY: CPT

## 2023-03-02 PROCEDURE — 93350 STRESS TTE ONLY: CPT | Performed by: INTERNAL MEDICINE

## 2023-03-02 PROCEDURE — 93350 STRESS TTE ONLY: CPT

## 2023-03-02 PROCEDURE — 93018 CV STRESS TEST I&R ONLY: CPT | Performed by: INTERNAL MEDICINE

## 2023-03-02 RX ADMIN — PERFLUTREN 8.48 MG: 6.52 INJECTION, SUSPENSION INTRAVENOUS at 09:06

## 2023-03-10 LAB
BH CV STRESS BP STAGE 1: NORMAL
BH CV STRESS BP STAGE 2: NORMAL
BH CV STRESS BP STAGE 3: NORMAL
BH CV STRESS DURATION MIN STAGE 1: 3
BH CV STRESS DURATION MIN STAGE 2: 3
BH CV STRESS DURATION MIN STAGE 3: 0
BH CV STRESS DURATION SEC STAGE 1: 0
BH CV STRESS DURATION SEC STAGE 2: 0
BH CV STRESS DURATION SEC STAGE 3: 55
BH CV STRESS GRADE STAGE 1: 10
BH CV STRESS GRADE STAGE 2: 12
BH CV STRESS GRADE STAGE 3: 14
BH CV STRESS HR STAGE 1: 98
BH CV STRESS HR STAGE 2: 113
BH CV STRESS HR STAGE 3: 126
BH CV STRESS METS STAGE 1: 5
BH CV STRESS METS STAGE 2: 7.5
BH CV STRESS METS STAGE 3: 10
BH CV STRESS PROTOCOL 1: NORMAL
BH CV STRESS RECOVERY BP: NORMAL MMHG
BH CV STRESS RECOVERY HR: 74 BPM
BH CV STRESS SPEED STAGE 1: 1.7
BH CV STRESS SPEED STAGE 2: 2.5
BH CV STRESS SPEED STAGE 3: 3.4
BH CV STRESS STAGE 1: 1
BH CV STRESS STAGE 2: 2
BH CV STRESS STAGE 3: 3
MAXIMAL PREDICTED HEART RATE: 150 BPM
PERCENT MAX PREDICTED HR: 84 %
STRESS BASELINE BP: NORMAL MMHG
STRESS BASELINE HR: 61 BPM
STRESS PERCENT HR: 99 %
STRESS POST ESTIMATED WORKLOAD: 10 METS
STRESS POST EXERCISE DUR MIN: 6 MIN
STRESS POST EXERCISE DUR SEC: 55 SEC
STRESS POST PEAK BP: NORMAL MMHG
STRESS POST PEAK HR: 126 BPM
STRESS TARGET HR: 128 BPM

## 2023-10-27 ENCOUNTER — APPOINTMENT (OUTPATIENT)
Dept: CT IMAGING | Age: 73
End: 2023-10-27
Payer: MEDICARE

## 2023-10-27 ENCOUNTER — HOSPITAL ENCOUNTER (OUTPATIENT)
Age: 73
Setting detail: OBSERVATION
Discharge: HOME | End: 2023-10-30
Attending: EMERGENCY MEDICINE | Admitting: FAMILY MEDICINE
Payer: MEDICARE

## 2023-10-27 DIAGNOSIS — E16.2 HYPOGLYCEMIA: Primary | ICD-10-CM

## 2023-10-27 LAB
ALBUMIN SERPL-MCNC: 4.3 G/DL (ref 3.5–5.2)
ALP SERPL-CCNC: 100 U/L (ref 40–130)
ALT SERPL-CCNC: 22 U/L (ref 5–41)
ANION GAP SERPL CALCULATED.3IONS-SCNC: 14 MMOL/L (ref 7–19)
AST SERPL-CCNC: 19 U/L (ref 5–40)
B PARAP IS1001 DNA NPH QL NAA+NON-PROBE: NOT DETECTED
B PERT.PT PRMT NPH QL NAA+NON-PROBE: NOT DETECTED
BASOPHILS # BLD: 0 K/UL (ref 0–0.2)
BASOPHILS NFR BLD: 0.2 % (ref 0–1)
BILIRUB SERPL-MCNC: 0.4 MG/DL (ref 0.2–1.2)
BILIRUB UR QL STRIP: NEGATIVE
BUN SERPL-MCNC: 12 MG/DL (ref 8–23)
C PNEUM DNA NPH QL NAA+NON-PROBE: NOT DETECTED
CALCIUM SERPL-MCNC: 8.6 MG/DL (ref 8.8–10.2)
CHLORIDE SERPL-SCNC: 105 MMOL/L (ref 98–111)
CHP ED QC CHECK: YES
CHP ED QC CHECK: YES
CLARITY UR: ABNORMAL
CO2 SERPL-SCNC: 24 MMOL/L (ref 22–29)
COLOR UR: YELLOW
CREAT SERPL-MCNC: 1 MG/DL (ref 0.5–1.2)
EOSINOPHIL # BLD: 0 K/UL (ref 0–0.6)
EOSINOPHIL NFR BLD: 0.1 % (ref 0–5)
ERYTHROCYTE [DISTWIDTH] IN BLOOD BY AUTOMATED COUNT: 13.4 % (ref 11.5–14.5)
FLUAV RNA NPH QL NAA+NON-PROBE: NOT DETECTED
FLUBV RNA NPH QL NAA+NON-PROBE: NOT DETECTED
GLUCOSE BLD-MCNC: 39 MG/DL (ref 70–99)
GLUCOSE BLD-MCNC: 50 MG/DL (ref 70–99)
GLUCOSE BLD-MCNC: 56 MG/DL
GLUCOSE BLD-MCNC: 78 MG/DL (ref 70–99)
GLUCOSE BLD-MCNC: 78 MG/DL (ref 70–99)
GLUCOSE BLD-MCNC: 87 MG/DL
GLUCOSE BLD-MCNC: 87 MG/DL (ref 70–99)
GLUCOSE SERPL-MCNC: 86 MG/DL (ref 74–109)
GLUCOSE UR STRIP.AUTO-MCNC: NEGATIVE MG/DL
HADV DNA NPH QL NAA+NON-PROBE: NOT DETECTED
HCOV 229E RNA NPH QL NAA+NON-PROBE: NOT DETECTED
HCOV HKU1 RNA NPH QL NAA+NON-PROBE: NOT DETECTED
HCOV NL63 RNA NPH QL NAA+NON-PROBE: NOT DETECTED
HCOV OC43 RNA NPH QL NAA+NON-PROBE: NOT DETECTED
HCT VFR BLD AUTO: 45.3 % (ref 42–52)
HGB BLD-MCNC: 15.5 G/DL (ref 14–18)
HGB UR STRIP.AUTO-MCNC: NEGATIVE MG/L
HMPV RNA NPH QL NAA+NON-PROBE: NOT DETECTED
HPIV1 RNA NPH QL NAA+NON-PROBE: NOT DETECTED
HPIV2 RNA NPH QL NAA+NON-PROBE: NOT DETECTED
HPIV3 RNA NPH QL NAA+NON-PROBE: NOT DETECTED
HPIV4 RNA NPH QL NAA+NON-PROBE: NOT DETECTED
IMM GRANULOCYTES # BLD: 0 K/UL
KETONES UR STRIP.AUTO-MCNC: NEGATIVE MG/DL
LEUKOCYTE ESTERASE UR QL STRIP.AUTO: NEGATIVE
LYMPHOCYTES # BLD: 0.8 K/UL (ref 1.1–4.5)
LYMPHOCYTES NFR BLD: 10.1 % (ref 20–40)
M PNEUMO DNA NPH QL NAA+NON-PROBE: NOT DETECTED
MCH RBC QN AUTO: 30.6 PG (ref 27–31)
MCHC RBC AUTO-ENTMCNC: 34.2 G/DL (ref 33–37)
MCV RBC AUTO: 89.3 FL (ref 80–94)
MONOCYTES # BLD: 0.3 K/UL (ref 0–0.9)
MONOCYTES NFR BLD: 4 % (ref 0–10)
NEUTROPHILS # BLD: 7 K/UL (ref 1.5–7.5)
NEUTS SEG NFR BLD: 85.2 % (ref 50–65)
NITRITE UR QL STRIP.AUTO: NEGATIVE
PERFORMED ON: ABNORMAL
PERFORMED ON: ABNORMAL
PERFORMED ON: NORMAL
PH UR STRIP.AUTO: 6 [PH] (ref 5–8)
PLATELET # BLD AUTO: 217 K/UL (ref 130–400)
PMV BLD AUTO: 9.4 FL (ref 9.4–12.4)
POTASSIUM SERPL-SCNC: 3.4 MMOL/L (ref 3.5–5)
PROT SERPL-MCNC: 6.9 G/DL (ref 6.6–8.7)
PROT UR STRIP.AUTO-MCNC: NEGATIVE MG/DL
RBC # BLD AUTO: 5.07 M/UL (ref 4.7–6.1)
RSV RNA NPH QL NAA+NON-PROBE: NOT DETECTED
RV+EV RNA NPH QL NAA+NON-PROBE: NOT DETECTED
SARS-COV-2 RNA NPH QL NAA+NON-PROBE: NOT DETECTED
SODIUM SERPL-SCNC: 143 MMOL/L (ref 136–145)
SP GR UR STRIP.AUTO: 1.01 (ref 1–1.03)
TROPONIN T SERPL-MCNC: <0.01 NG/ML (ref 0–0.03)
UROBILINOGEN UR STRIP.AUTO-MCNC: 1 E.U./DL
WBC # BLD AUTO: 8.2 K/UL (ref 4.8–10.8)

## 2023-10-27 PROCEDURE — G0378 HOSPITAL OBSERVATION PER HR: HCPCS

## 2023-10-27 PROCEDURE — 2580000003 HC RX 258: Performed by: EMERGENCY MEDICINE

## 2023-10-27 PROCEDURE — 93005 ELECTROCARDIOGRAM TRACING: CPT | Performed by: EMERGENCY MEDICINE

## 2023-10-27 PROCEDURE — 96365 THER/PROPH/DIAG IV INF INIT: CPT

## 2023-10-27 PROCEDURE — 99285 EMERGENCY DEPT VISIT HI MDM: CPT

## 2023-10-27 PROCEDURE — 96360 HYDRATION IV INFUSION INIT: CPT

## 2023-10-27 PROCEDURE — 96372 THER/PROPH/DIAG INJ SC/IM: CPT

## 2023-10-27 PROCEDURE — 85025 COMPLETE CBC W/AUTO DIFF WBC: CPT

## 2023-10-27 PROCEDURE — 6360000002 HC RX W HCPCS: Performed by: EMERGENCY MEDICINE

## 2023-10-27 PROCEDURE — 80053 COMPREHEN METABOLIC PANEL: CPT

## 2023-10-27 PROCEDURE — 36415 COLL VENOUS BLD VENIPUNCTURE: CPT

## 2023-10-27 PROCEDURE — 0202U NFCT DS 22 TRGT SARS-COV-2: CPT

## 2023-10-27 PROCEDURE — 84484 ASSAY OF TROPONIN QUANT: CPT

## 2023-10-27 PROCEDURE — 70450 CT HEAD/BRAIN W/O DYE: CPT

## 2023-10-27 PROCEDURE — 96366 THER/PROPH/DIAG IV INF ADDON: CPT

## 2023-10-27 PROCEDURE — 81003 URINALYSIS AUTO W/O SCOPE: CPT

## 2023-10-27 PROCEDURE — 6370000000 HC RX 637 (ALT 250 FOR IP): Performed by: EMERGENCY MEDICINE

## 2023-10-27 PROCEDURE — 82962 GLUCOSE BLOOD TEST: CPT

## 2023-10-27 PROCEDURE — 96361 HYDRATE IV INFUSION ADD-ON: CPT

## 2023-10-27 RX ORDER — SODIUM CHLORIDE 9 MG/ML
INJECTION, SOLUTION INTRAVENOUS PRN
Status: DISCONTINUED | OUTPATIENT
Start: 2023-10-27 | End: 2023-10-30 | Stop reason: HOSPADM

## 2023-10-27 RX ORDER — SODIUM CHLORIDE 0.9 % (FLUSH) 0.9 %
5-40 SYRINGE (ML) INJECTION EVERY 12 HOURS SCHEDULED
Status: DISCONTINUED | OUTPATIENT
Start: 2023-10-27 | End: 2023-10-30 | Stop reason: HOSPADM

## 2023-10-27 RX ORDER — ONDANSETRON 2 MG/ML
4 INJECTION INTRAMUSCULAR; INTRAVENOUS EVERY 6 HOURS PRN
Status: DISCONTINUED | OUTPATIENT
Start: 2023-10-27 | End: 2023-10-30 | Stop reason: HOSPADM

## 2023-10-27 RX ORDER — DEXTROSE MONOHYDRATE 100 MG/ML
INJECTION, SOLUTION INTRAVENOUS CONTINUOUS
Status: DISCONTINUED | OUTPATIENT
Start: 2023-10-27 | End: 2023-10-30 | Stop reason: HOSPADM

## 2023-10-27 RX ORDER — SODIUM CHLORIDE 0.9 % (FLUSH) 0.9 %
5-40 SYRINGE (ML) INJECTION PRN
Status: DISCONTINUED | OUTPATIENT
Start: 2023-10-27 | End: 2023-10-30 | Stop reason: HOSPADM

## 2023-10-27 RX ORDER — DEXTROSE MONOHYDRATE 100 MG/ML
INJECTION, SOLUTION INTRAVENOUS CONTINUOUS
Status: DISCONTINUED | OUTPATIENT
Start: 2023-10-27 | End: 2023-10-27

## 2023-10-27 RX ORDER — POTASSIUM CHLORIDE 20 MEQ/1
20 TABLET, EXTENDED RELEASE ORAL ONCE
Status: COMPLETED | OUTPATIENT
Start: 2023-10-27 | End: 2023-10-27

## 2023-10-27 RX ORDER — ONDANSETRON 4 MG/1
4 TABLET, ORALLY DISINTEGRATING ORAL EVERY 8 HOURS PRN
Status: DISCONTINUED | OUTPATIENT
Start: 2023-10-27 | End: 2023-10-30 | Stop reason: HOSPADM

## 2023-10-27 RX ORDER — ACETAMINOPHEN 325 MG/1
650 TABLET ORAL EVERY 4 HOURS PRN
Status: DISCONTINUED | OUTPATIENT
Start: 2023-10-27 | End: 2023-10-30 | Stop reason: HOSPADM

## 2023-10-27 RX ORDER — ENOXAPARIN SODIUM 100 MG/ML
40 INJECTION SUBCUTANEOUS DAILY
Status: DISCONTINUED | OUTPATIENT
Start: 2023-10-27 | End: 2023-10-30 | Stop reason: HOSPADM

## 2023-10-27 RX ADMIN — DEXTROSE MONOHYDRATE: 10 INJECTION, SOLUTION INTRAVENOUS at 20:15

## 2023-10-27 RX ADMIN — POTASSIUM CHLORIDE 20 MEQ: 1500 TABLET, EXTENDED RELEASE ORAL at 15:13

## 2023-10-27 RX ADMIN — ENOXAPARIN SODIUM 40 MG: 100 INJECTION SUBCUTANEOUS at 22:28

## 2023-10-27 RX ADMIN — DEXTROSE MONOHYDRATE: 10 INJECTION, SOLUTION INTRAVENOUS at 17:41

## 2023-10-27 ASSESSMENT — PAIN - FUNCTIONAL ASSESSMENT: PAIN_FUNCTIONAL_ASSESSMENT: 0-10

## 2023-10-27 ASSESSMENT — PAIN SCALES - GENERAL
PAINLEVEL_OUTOF10: 0
PAINLEVEL_OUTOF10: 0

## 2023-10-27 ASSESSMENT — LIFESTYLE VARIABLES
HOW OFTEN DO YOU HAVE A DRINK CONTAINING ALCOHOL: NEVER
HOW MANY STANDARD DRINKS CONTAINING ALCOHOL DO YOU HAVE ON A TYPICAL DAY: PATIENT DOES NOT DRINK

## 2023-10-27 NOTE — ED NOTES
Accucheck 39 and 42 on second accucheck pt given two cartons of orange juice.  Physician notified     Favian Link RN  10/27/23 [id number]

## 2023-10-27 NOTE — ED NOTES
Pt given turkey sandwich, fruit cup, and pretzels. Pt A&Ox4.       [place], [place]  10/27/23 1722

## 2023-10-27 NOTE — ED PROVIDER NOTES
[address] [facility] EMERGENCY DEPT  eMERGENCY dEPARTMENT eNCOUnter      Pt Name: Kristel Carrizales  MRN: [id number]  [address] 1950  Date of evaluation: 10/27/2023  Provider: Moy Milan DO    CHIEF COMPLAINT       Chief Complaint   Patient presents with    Altered Mental Status     EMS reports new onset confusion this morning according to wife. Glucose 55 per EMS oral glucose was given and pt blood sugar was 87 after. Pt alert and oriented at this time. HISTORY OF PRESENT ILLNESS   (Location/Symptom, Timing/Onset,Context/Setting, Quality, Duration, Modifying Factors, Severity)  Note limiting factors. Kristel Carrizales is a 72 y.o. male who presents to the emergency department      The patient is a 28-year-old male who presents emergency department by EMS altered mental status. Wife provides some of the history. She says that the patient normally gets up very early in the morning. They have breakfast together around 6 AM.  She says this morning at 11 AM he was still sleeping. She went in to wake him. He got up and went to get his medications and seemed confused about his medications. Patient says he does recall the event. He says he felt confused about taking his medications. EMS was called and arrived his glucose was 55. They gave him oral glucose and symptoms resolved. Repeat blood sugar was 87. Patient feels well at this time. Denies history of hypoglycemia. He says about a week ago he was started on 2 new medications by his PCP Dr. Brandee Mcclain but he doesn't know what the medications are called or what they were treating. There are no other complaints/concerns at this time. NursingNotes were reviewed.     REVIEW OF SYSTEMS    (2-9 systems for level 4, 10 or more for level 5)     As per HPI         PAST MEDICALHISTORY     Past Medical History:   Diagnosis Date    Hypertension          SURGICAL HISTORY       Past Surgical History:   Procedure Laterality Date    EYE SURGERY Bilateral     KNEE SURGERY

## 2023-10-27 NOTE — ED NOTES
Called Dr. Natalya Mora office for Dr. Nicki Thomason Left message with answering service. Dr. Maryan Foley on call.          Cecelia Giang  10/27/23 0979

## 2023-10-28 LAB
GLUCOSE BLD-MCNC: 116 MG/DL (ref 70–99)
GLUCOSE BLD-MCNC: 133 MG/DL (ref 70–99)
GLUCOSE BLD-MCNC: 303 MG/DL (ref 70–99)
GLUCOSE BLD-MCNC: 45 MG/DL (ref 70–99)
GLUCOSE BLD-MCNC: 63 MG/DL (ref 70–99)
GLUCOSE BLD-MCNC: 65 MG/DL (ref 70–99)
GLUCOSE BLD-MCNC: 67 MG/DL (ref 70–99)
PERFORMED ON: ABNORMAL

## 2023-10-28 PROCEDURE — 96366 THER/PROPH/DIAG IV INF ADDON: CPT

## 2023-10-28 PROCEDURE — 2580000003 HC RX 258: Performed by: FAMILY MEDICINE

## 2023-10-28 PROCEDURE — 96361 HYDRATE IV INFUSION ADD-ON: CPT

## 2023-10-28 PROCEDURE — 82962 GLUCOSE BLOOD TEST: CPT

## 2023-10-28 PROCEDURE — 6370000000 HC RX 637 (ALT 250 FOR IP): Performed by: FAMILY MEDICINE

## 2023-10-28 PROCEDURE — G0378 HOSPITAL OBSERVATION PER HR: HCPCS

## 2023-10-28 PROCEDURE — 96372 THER/PROPH/DIAG INJ SC/IM: CPT

## 2023-10-28 PROCEDURE — 6360000002 HC RX W HCPCS: Performed by: EMERGENCY MEDICINE

## 2023-10-28 RX ORDER — NIFEDIPINE 30 MG/1
90 TABLET, EXTENDED RELEASE ORAL DAILY
Status: DISCONTINUED | OUTPATIENT
Start: 2023-10-28 | End: 2023-10-30 | Stop reason: HOSPADM

## 2023-10-28 RX ORDER — ASPIRIN 81 MG/1
81 TABLET, CHEWABLE ORAL DAILY
Status: DISCONTINUED | OUTPATIENT
Start: 2023-10-28 | End: 2023-10-30 | Stop reason: HOSPADM

## 2023-10-28 RX ORDER — HYDROCHLOROTHIAZIDE 25 MG/1
12.5 TABLET ORAL DAILY
Status: DISCONTINUED | OUTPATIENT
Start: 2023-10-28 | End: 2023-10-30 | Stop reason: HOSPADM

## 2023-10-28 RX ORDER — PANTOPRAZOLE SODIUM 40 MG/1
40 TABLET, DELAYED RELEASE ORAL
Status: DISCONTINUED | OUTPATIENT
Start: 2023-10-29 | End: 2023-10-30 | Stop reason: HOSPADM

## 2023-10-28 RX ORDER — NIFEDIPINE 90 MG/1
90 TABLET, FILM COATED, EXTENDED RELEASE ORAL DAILY
Status: DISCONTINUED | OUTPATIENT
Start: 2023-10-28 | End: 2023-10-28

## 2023-10-28 RX ORDER — LOSARTAN POTASSIUM 50 MG/1
50 TABLET ORAL DAILY
Status: DISCONTINUED | OUTPATIENT
Start: 2023-10-28 | End: 2023-10-30 | Stop reason: HOSPADM

## 2023-10-28 RX ORDER — GLYCOPYRROLATE 1 MG/1
1 TABLET ORAL 2 TIMES DAILY
Status: ON HOLD | COMMUNITY
End: 2023-10-30 | Stop reason: HOSPADM

## 2023-10-28 RX ORDER — MECLIZINE HCL 25MG 25 MG/1
25 TABLET, CHEWABLE ORAL 3 TIMES DAILY PRN
Status: ON HOLD | COMMUNITY
End: 2023-10-30 | Stop reason: HOSPADM

## 2023-10-28 RX ORDER — LOSARTAN POTASSIUM AND HYDROCHLOROTHIAZIDE 12.5; 5 MG/1; MG/1
1 TABLET ORAL DAILY
Status: DISCONTINUED | OUTPATIENT
Start: 2023-10-28 | End: 2023-10-28

## 2023-10-28 RX ADMIN — ENOXAPARIN SODIUM 40 MG: 100 INJECTION SUBCUTANEOUS at 11:05

## 2023-10-28 RX ADMIN — DEXTROSE MONOHYDRATE: 10 INJECTION, SOLUTION INTRAVENOUS at 11:52

## 2023-10-28 RX ADMIN — ASPIRIN 81 MG: 81 TABLET, CHEWABLE ORAL at 15:30

## 2023-10-28 RX ADMIN — HYDROCHLOROTHIAZIDE 12.5 MG: 25 TABLET ORAL at 15:30

## 2023-10-28 RX ADMIN — LOSARTAN POTASSIUM 50 MG: 50 TABLET, FILM COATED ORAL at 15:30

## 2023-10-28 RX ADMIN — NIFEDIPINE 90 MG: 30 TABLET, EXTENDED RELEASE ORAL at 15:30

## 2023-10-28 ASSESSMENT — ENCOUNTER SYMPTOMS
ABDOMINAL PAIN: 0
VOMITING: 0
COLOR CHANGE: 0
COUGH: 0
SHORTNESS OF BREATH: 0
NAUSEA: 0

## 2023-10-28 ASSESSMENT — PAIN SCALES - GENERAL: PAINLEVEL_OUTOF10: 0

## 2023-10-28 NOTE — H&P
General: Abdomen is flat. Bowel sounds are normal. There is no distension. Tenderness: There is no abdominal tenderness. Skin:     Capillary Refill: Capillary refill takes less than 2 seconds. Neurological:      General: No focal deficit present. Mental Status: He is alert. Psychiatric:         Mood and Affect: Mood normal.                 CBC:   Recent Labs     10/27/23  1249   WBC 8.2   HGB 15.5        BMP:    Recent Labs     10/27/23  1249 10/27/23  1603 10/27/23  1901     --   --    K 3.4*  --   --      --   --    CO2 24  --   --    BUN 12  --   --    CREATININE 1.0  --   --    GLUCOSE 86 56 87     Hepatic:   Recent Labs     10/27/23  1249   AST 19   ALT 22   BILITOT 0.4   ALKPHOS 100     Troponin T:   Recent Labs     10/27/23  1249   TROPONINI <0.01     Pro-BNP: No results for input(s): \"BNP\" in the last 72 hours. Lipids: No results for input(s): \"CHOL\", \"HDL\" in the last 72 hours. Invalid input(s): \"LDLCALCU\"  ABGs: No results found for: \"PHART\", \"PO2ART\", \"NIA9OEF\"  INR: No results for input(s): \"INR\" in the last 72 hours. -----------------------------------------------------------------  EKG:   Radiology:     CT HEAD WO CONTRAST    Result Date: 10/27/2023  EXAM:  CT HEAD WITHOUT CONTRAST. HISTORY:  Altered mental status. COMPARISON:  None available. TECHNIQUE:  Noncontrast CT images of the head were obtained. Axial reconstructions with sagittal and coronal reformats were provided. The submitted images are moderately limited by patient motion artifact. FINDINGS:  Prominence of the sulci and ventricles consistent with mild to moderate cortical volume loss. Old lacunar infarct/encephalomalacia measuring 1.2 cm extent at the level of the left basal ganglia through the caudate nucleus extending to a periventricular location with some ex vacuo dilatation of the left lateral ventricle.   Regions of decreased attenuation throughout the periventricular, deep and

## 2023-10-28 NOTE — ED NOTES
Pt said hypertension was the only medical diagnosis he has had when asked.      Nila Hernandez  10/27/23 8615

## 2023-10-29 LAB
ALBUMIN SERPL-MCNC: 3.5 G/DL (ref 3.5–5.2)
ALP SERPL-CCNC: 87 U/L (ref 40–130)
ALT SERPL-CCNC: 20 U/L (ref 5–41)
ANION GAP SERPL CALCULATED.3IONS-SCNC: 10 MMOL/L (ref 7–19)
AST SERPL-CCNC: 15 U/L (ref 5–40)
BASOPHILS # BLD: 0.1 K/UL (ref 0–0.2)
BASOPHILS NFR BLD: 1 % (ref 0–1)
BILIRUB SERPL-MCNC: 0.3 MG/DL (ref 0.2–1.2)
BUN SERPL-MCNC: 9 MG/DL (ref 8–23)
CALCIUM SERPL-MCNC: 8.4 MG/DL (ref 8.8–10.2)
CHLORIDE SERPL-SCNC: 108 MMOL/L (ref 98–111)
CO2 SERPL-SCNC: 25 MMOL/L (ref 22–29)
CREAT SERPL-MCNC: 1.2 MG/DL (ref 0.5–1.2)
EOSINOPHIL # BLD: 0.1 K/UL (ref 0–0.6)
EOSINOPHIL NFR BLD: 1.7 % (ref 0–5)
ERYTHROCYTE [DISTWIDTH] IN BLOOD BY AUTOMATED COUNT: 13.5 % (ref 11.5–14.5)
GLUCOSE BLD-MCNC: 115 MG/DL (ref 70–99)
GLUCOSE BLD-MCNC: 149 MG/DL (ref 70–99)
GLUCOSE BLD-MCNC: 85 MG/DL (ref 70–99)
GLUCOSE BLD-MCNC: 89 MG/DL (ref 70–99)
GLUCOSE BLD-MCNC: 93 MG/DL (ref 70–99)
GLUCOSE BLD-MCNC: 95 MG/DL (ref 70–99)
GLUCOSE BLD-MCNC: 95 MG/DL (ref 70–99)
GLUCOSE SERPL-MCNC: 104 MG/DL (ref 74–109)
HCT VFR BLD AUTO: 42.9 % (ref 42–52)
HGB BLD-MCNC: 14.2 G/DL (ref 14–18)
IMM GRANULOCYTES # BLD: 0 K/UL
LYMPHOCYTES # BLD: 1.2 K/UL (ref 1.1–4.5)
LYMPHOCYTES NFR BLD: 19.5 % (ref 20–40)
MCH RBC QN AUTO: 30 PG (ref 27–31)
MCHC RBC AUTO-ENTMCNC: 33.1 G/DL (ref 33–37)
MCV RBC AUTO: 90.5 FL (ref 80–94)
MONOCYTES # BLD: 0.5 K/UL (ref 0–0.9)
MONOCYTES NFR BLD: 9.1 % (ref 0–10)
NEUTROPHILS # BLD: 4.1 K/UL (ref 1.5–7.5)
NEUTS SEG NFR BLD: 68.4 % (ref 50–65)
PERFORMED ON: ABNORMAL
PERFORMED ON: ABNORMAL
PERFORMED ON: NORMAL
PLATELET # BLD AUTO: 167 K/UL (ref 130–400)
PMV BLD AUTO: 9.2 FL (ref 9.4–12.4)
POTASSIUM SERPL-SCNC: 3.6 MMOL/L (ref 3.5–5)
PROT SERPL-MCNC: 5.6 G/DL (ref 6.6–8.7)
RBC # BLD AUTO: 4.74 M/UL (ref 4.7–6.1)
SODIUM SERPL-SCNC: 143 MMOL/L (ref 136–145)
WBC # BLD AUTO: 6 K/UL (ref 4.8–10.8)

## 2023-10-29 PROCEDURE — 84681 ASSAY OF C-PEPTIDE: CPT

## 2023-10-29 PROCEDURE — 85025 COMPLETE CBC W/AUTO DIFF WBC: CPT

## 2023-10-29 PROCEDURE — 6370000000 HC RX 637 (ALT 250 FOR IP): Performed by: FAMILY MEDICINE

## 2023-10-29 PROCEDURE — 82962 GLUCOSE BLOOD TEST: CPT

## 2023-10-29 PROCEDURE — 36415 COLL VENOUS BLD VENIPUNCTURE: CPT

## 2023-10-29 PROCEDURE — 96372 THER/PROPH/DIAG INJ SC/IM: CPT

## 2023-10-29 PROCEDURE — G0378 HOSPITAL OBSERVATION PER HR: HCPCS

## 2023-10-29 PROCEDURE — 2580000003 HC RX 258: Performed by: EMERGENCY MEDICINE

## 2023-10-29 PROCEDURE — 80053 COMPREHEN METABOLIC PANEL: CPT

## 2023-10-29 PROCEDURE — 6360000002 HC RX W HCPCS: Performed by: EMERGENCY MEDICINE

## 2023-10-29 PROCEDURE — 84305 ASSAY OF SOMATOMEDIN: CPT

## 2023-10-29 RX ADMIN — ENOXAPARIN SODIUM 40 MG: 100 INJECTION SUBCUTANEOUS at 09:25

## 2023-10-29 RX ADMIN — SODIUM CHLORIDE, PRESERVATIVE FREE 10 ML: 5 INJECTION INTRAVENOUS at 09:29

## 2023-10-29 RX ADMIN — PANTOPRAZOLE SODIUM 40 MG: 40 TABLET, DELAYED RELEASE ORAL at 05:57

## 2023-10-29 RX ADMIN — NIFEDIPINE 90 MG: 30 TABLET, EXTENDED RELEASE ORAL at 09:25

## 2023-10-29 RX ADMIN — ASPIRIN 81 MG: 81 TABLET, CHEWABLE ORAL at 09:24

## 2023-10-29 RX ADMIN — LOSARTAN POTASSIUM 50 MG: 50 TABLET, FILM COATED ORAL at 09:25

## 2023-10-29 RX ADMIN — SODIUM CHLORIDE, PRESERVATIVE FREE 10 ML: 5 INJECTION INTRAVENOUS at 20:15

## 2023-10-29 RX ADMIN — HYDROCHLOROTHIAZIDE 12.5 MG: 25 TABLET ORAL at 09:24

## 2023-10-29 ASSESSMENT — ENCOUNTER SYMPTOMS
NAUSEA: 0
COUGH: 0
SHORTNESS OF BREATH: 0
VOMITING: 0
COLOR CHANGE: 0
ABDOMINAL PAIN: 0

## 2023-10-29 NOTE — PLAN OF CARE
Problem: Discharge Planning  Goal: Discharge to home or other facility with appropriate resources  10/29/2023 0006 by Paulina Godfrey LPN  Outcome: Progressing  Flowsheets (Taken 10/28/2023 2254)  Discharge to home or other facility with appropriate resources: Identify barriers to discharge with patient and caregiver  10/28/2023 1145 by Davina Scheuermann, LPN  Outcome: Progressing     Problem: Pain  Goal: Verbalizes/displays adequate comfort level or baseline comfort level  10/29/2023 0006 by Paulina Godfrey LPN  Outcome: Progressing  10/28/2023 1145 by Davina Scheuermann, LPN  Outcome: Progressing

## 2023-10-29 NOTE — PLAN OF CARE
Problem: Discharge Planning  Goal: Discharge to home or other facility with appropriate resources  10/29/2023 1106 by Reji Gupta RN  Outcome: Progressing  Flowsheets (Taken 10/29/2023 1045)  Discharge to home or other facility with appropriate resources: Identify barriers to discharge with patient and caregiver  10/29/2023 0006 by Ruby Yeung LPN  Outcome: Progressing  Flowsheets (Taken 10/28/2023 2254)  Discharge to home or other facility with appropriate resources: Identify barriers to discharge with patient and caregiver     Problem: Pain  Goal: Verbalizes/displays adequate comfort level or baseline comfort level  10/29/2023 1106 by Reji Gupta RN  Outcome: Progressing  10/29/2023 0006 by Ruby Yeung LPN  Outcome: Progressing

## 2023-10-30 VITALS
RESPIRATION RATE: 16 BRPM | OXYGEN SATURATION: 92 % | HEIGHT: 65 IN | SYSTOLIC BLOOD PRESSURE: 128 MMHG | HEART RATE: 75 BPM | TEMPERATURE: 98 F | DIASTOLIC BLOOD PRESSURE: 78 MMHG | WEIGHT: 161.7 LBS | BODY MASS INDEX: 26.94 KG/M2

## 2023-10-30 LAB
ALBUMIN SERPL-MCNC: 3.6 G/DL (ref 3.5–5.2)
ALP SERPL-CCNC: 87 U/L (ref 40–130)
ALT SERPL-CCNC: 18 U/L (ref 5–41)
ANION GAP SERPL CALCULATED.3IONS-SCNC: 10 MMOL/L (ref 7–19)
AST SERPL-CCNC: 13 U/L (ref 5–40)
BASOPHILS # BLD: 0.1 K/UL (ref 0–0.2)
BASOPHILS NFR BLD: 0.8 % (ref 0–1)
BILIRUB SERPL-MCNC: 0.3 MG/DL (ref 0.2–1.2)
BUN SERPL-MCNC: 18 MG/DL (ref 8–23)
CALCIUM SERPL-MCNC: 8.1 MG/DL (ref 8.8–10.2)
CHLORIDE SERPL-SCNC: 106 MMOL/L (ref 98–111)
CO2 SERPL-SCNC: 27 MMOL/L (ref 22–29)
CREAT SERPL-MCNC: 1.2 MG/DL (ref 0.5–1.2)
EKG P AXIS: 22 DEGREES
EKG P-R INTERVAL: 168 MS
EKG Q-T INTERVAL: 416 MS
EKG QRS DURATION: 76 MS
EKG QTC CALCULATION (BAZETT): 419 MS
EKG T AXIS: -6 DEGREES
EOSINOPHIL # BLD: 0.1 K/UL (ref 0–0.6)
EOSINOPHIL NFR BLD: 1.3 % (ref 0–5)
ERYTHROCYTE [DISTWIDTH] IN BLOOD BY AUTOMATED COUNT: 13.5 % (ref 11.5–14.5)
GLUCOSE BLD-MCNC: 107 MG/DL (ref 70–99)
GLUCOSE BLD-MCNC: 112 MG/DL (ref 70–99)
GLUCOSE BLD-MCNC: 141 MG/DL (ref 70–99)
GLUCOSE SERPL-MCNC: 147 MG/DL (ref 74–109)
HCT VFR BLD AUTO: 40.7 % (ref 42–52)
HGB BLD-MCNC: 13.5 G/DL (ref 14–18)
IMM GRANULOCYTES # BLD: 0 K/UL
LYMPHOCYTES # BLD: 1 K/UL (ref 1.1–4.5)
LYMPHOCYTES NFR BLD: 17.1 % (ref 20–40)
MAGNESIUM SERPL-MCNC: 2.2 MG/DL (ref 1.6–2.4)
MCH RBC QN AUTO: 30.2 PG (ref 27–31)
MCHC RBC AUTO-ENTMCNC: 33.2 G/DL (ref 33–37)
MCV RBC AUTO: 91.1 FL (ref 80–94)
MONOCYTES # BLD: 0.7 K/UL (ref 0–0.9)
MONOCYTES NFR BLD: 11 % (ref 0–10)
NEUTROPHILS # BLD: 4.2 K/UL (ref 1.5–7.5)
NEUTS SEG NFR BLD: 69.6 % (ref 50–65)
PERFORMED ON: ABNORMAL
PLATELET # BLD AUTO: 160 K/UL (ref 130–400)
PMV BLD AUTO: 9.6 FL (ref 9.4–12.4)
POTASSIUM SERPL-SCNC: 3.5 MMOL/L (ref 3.5–5)
PROT SERPL-MCNC: 5.6 G/DL (ref 6.6–8.7)
RBC # BLD AUTO: 4.47 M/UL (ref 4.7–6.1)
SODIUM SERPL-SCNC: 143 MMOL/L (ref 136–145)
WBC # BLD AUTO: 6.1 K/UL (ref 4.8–10.8)

## 2023-10-30 PROCEDURE — 85025 COMPLETE CBC W/AUTO DIFF WBC: CPT

## 2023-10-30 PROCEDURE — 6370000000 HC RX 637 (ALT 250 FOR IP): Performed by: FAMILY MEDICINE

## 2023-10-30 PROCEDURE — 94760 N-INVAS EAR/PLS OXIMETRY 1: CPT

## 2023-10-30 PROCEDURE — 36415 COLL VENOUS BLD VENIPUNCTURE: CPT

## 2023-10-30 PROCEDURE — 93010 ELECTROCARDIOGRAM REPORT: CPT | Performed by: INTERNAL MEDICINE

## 2023-10-30 PROCEDURE — 2580000003 HC RX 258: Performed by: EMERGENCY MEDICINE

## 2023-10-30 PROCEDURE — 80053 COMPREHEN METABOLIC PANEL: CPT

## 2023-10-30 PROCEDURE — 96372 THER/PROPH/DIAG INJ SC/IM: CPT

## 2023-10-30 PROCEDURE — 6360000002 HC RX W HCPCS: Performed by: EMERGENCY MEDICINE

## 2023-10-30 PROCEDURE — G0378 HOSPITAL OBSERVATION PER HR: HCPCS

## 2023-10-30 PROCEDURE — 82962 GLUCOSE BLOOD TEST: CPT

## 2023-10-30 PROCEDURE — 83735 ASSAY OF MAGNESIUM: CPT

## 2023-10-30 RX ADMIN — LOSARTAN POTASSIUM 50 MG: 50 TABLET, FILM COATED ORAL at 09:14

## 2023-10-30 RX ADMIN — HYDROCHLOROTHIAZIDE 12.5 MG: 25 TABLET ORAL at 09:14

## 2023-10-30 RX ADMIN — ASPIRIN 81 MG: 81 TABLET, CHEWABLE ORAL at 09:14

## 2023-10-30 RX ADMIN — NIFEDIPINE 90 MG: 30 TABLET, EXTENDED RELEASE ORAL at 09:14

## 2023-10-30 RX ADMIN — ENOXAPARIN SODIUM 40 MG: 100 INJECTION SUBCUTANEOUS at 09:14

## 2023-10-30 RX ADMIN — PANTOPRAZOLE SODIUM 40 MG: 40 TABLET, DELAYED RELEASE ORAL at 05:48

## 2023-10-30 RX ADMIN — SODIUM CHLORIDE, PRESERVATIVE FREE 10 ML: 5 INJECTION INTRAVENOUS at 09:14

## 2023-10-30 NOTE — PLAN OF CARE
Problem: Discharge Planning  Goal: Discharge to home or other facility with appropriate resources  Outcome: Progressing  Flowsheets (Taken 10/29/2023 2140)  Discharge to home or other facility with appropriate resources: Identify barriers to discharge with patient and caregiver     Problem: Pain  Goal: Verbalizes/displays adequate comfort level or baseline comfort level  Outcome: Progressing

## 2023-10-30 NOTE — DISCHARGE SUMMARY
Discharge Summary    Patient ID: Donald Greenberg  MRN: [id number]     Acct:  [de-identified]       Patient's PCP: Dilip Murray MD    Admit Date: 10/27/2023     Discharge Date:   10/30/2023      Admitting Physician: Nayan Jimenez MD    Discharge Physician: Dilip Murray MD     Active Discharge Diagnoses:  Hypoglycemia    Primary Problem  Hypoglycemia  [address] Problems    Diagnosis Date Noted    Hypoglycemia [E16.2] 10/27/2023       The patient was seen and examined on day of discharge and this discharge summary is in conjunction with the daily progress note from day of discharge. Code Status:  Full Code    Hospital Course: The patient is a 72 y.o. male with Altered mental status presented to the emergency department. Per ED chart review, EMS reported blood sugar of 55 on arrival.  Despite dextrose infusion and meals, BG continues to go down. On diabetic medications, no history of diabetes. Only new medications are from the office on 10/20/2023 of meclizine and glycopyrrolate when he presented with dizziness and diagnosed with BPPV. He reports not taking any of the meclizine. BG normal at that time. This has improved. Sugar remained stable and he remained asymptomatic throughout stay. Please see daily progress note for further details concerning their stay. The patient improved throughout their stay and reached maximum medical improvement on the day of discharge. The patient/family agree with the treatment plan as outlined above. All questions concerning their stay were answered prior to discharge. They understand the importance of follow up concerning any abnormal test results. Physical Exam  Vitals and nursing note reviewed. Constitutional:       Appearance: Normal appearance. HENT:      Head: Normocephalic and atraumatic. Mouth/Throat:      Mouth: Mucous membranes are moist.   Eyes:      Pupils: Pupils are equal, round, and reactive to light.

## 2023-10-31 LAB
C PEPTIDE SERPL-MCNC: 4.6 NG/ML (ref 1.1–4.4)
IGF-I SERPL-MCNC: 160 NG/ML (ref 26–245)
IGF-I Z-SCORE SERPL: 1

## 2024-01-19 ENCOUNTER — TRANSCRIBE ORDERS (OUTPATIENT)
Dept: ADMINISTRATIVE | Facility: HOSPITAL | Age: 74
End: 2024-01-19
Payer: MEDICARE

## 2024-01-19 DIAGNOSIS — R97.20 ELEVATED PSA: Primary | ICD-10-CM

## 2024-02-23 ENCOUNTER — HOSPITAL ENCOUNTER (OUTPATIENT)
Dept: MRI IMAGING | Facility: HOSPITAL | Age: 74
Discharge: HOME | End: 2024-02-23
Payer: MEDICARE

## 2024-02-23 DIAGNOSIS — R97.20 ELEVATED PSA: ICD-10-CM

## 2024-02-23 LAB — CREAT BLDA-MCNC: 1.2 MG/DL (ref 0.6–1.3)

## 2024-02-23 PROCEDURE — 0 GADOBENATE DIMEGLUMINE 529 MG/ML SOLUTION: Performed by: FAMILY MEDICINE

## 2024-02-23 PROCEDURE — A9577 INJ MULTIHANCE: HCPCS | Performed by: FAMILY MEDICINE

## 2024-02-23 PROCEDURE — 82565 ASSAY OF CREATININE: CPT

## 2024-02-23 PROCEDURE — 72197 MRI PELVIS W/O & W/DYE: CPT

## 2024-02-23 RX ADMIN — GADOBENATE DIMEGLUMINE 14 ML: 529 INJECTION, SOLUTION INTRAVENOUS at 08:53

## 2024-03-06 RX ORDER — DICLOFENAC SODIUM 75 MG/1
75 TABLET, DELAYED RELEASE ORAL 2 TIMES DAILY
COMMUNITY

## 2024-03-06 RX ORDER — TAMSULOSIN HYDROCHLORIDE 0.4 MG/1
0.4 CAPSULE ORAL DAILY
COMMUNITY
Start: 2024-01-18

## 2024-03-06 RX ORDER — LOSARTAN POTASSIUM 50 MG/1
50 TABLET ORAL DAILY
COMMUNITY
Start: 2024-01-18

## 2024-03-14 ENCOUNTER — OFFICE VISIT (OUTPATIENT)
Dept: UROLOGY | Age: 74
End: 2024-03-14

## 2024-03-14 ENCOUNTER — OFFICE VISIT (OUTPATIENT)
Dept: GASTROENTEROLOGY | Age: 74
End: 2024-03-14
Payer: MEDICARE

## 2024-03-14 VITALS — BODY MASS INDEX: 27.89 KG/M2 | TEMPERATURE: 97.9 F | HEIGHT: 65 IN | WEIGHT: 167.4 LBS

## 2024-03-14 VITALS
HEART RATE: 81 BPM | SYSTOLIC BLOOD PRESSURE: 130 MMHG | DIASTOLIC BLOOD PRESSURE: 80 MMHG | WEIGHT: 166 LBS | HEIGHT: 65 IN | BODY MASS INDEX: 27.66 KG/M2 | OXYGEN SATURATION: 96 %

## 2024-03-14 DIAGNOSIS — Z86.010 HISTORY OF COLON POLYPS: ICD-10-CM

## 2024-03-14 DIAGNOSIS — N13.8 BPH WITH OBSTRUCTION/LOWER URINARY TRACT SYMPTOMS: ICD-10-CM

## 2024-03-14 DIAGNOSIS — N40.1 BPH WITH OBSTRUCTION/LOWER URINARY TRACT SYMPTOMS: ICD-10-CM

## 2024-03-14 DIAGNOSIS — R97.20 ELEVATED PSA: Primary | ICD-10-CM

## 2024-03-14 DIAGNOSIS — K21.9 GASTROESOPHAGEAL REFLUX DISEASE, UNSPECIFIED WHETHER ESOPHAGITIS PRESENT: ICD-10-CM

## 2024-03-14 DIAGNOSIS — K22.70 BARRETT'S ESOPHAGUS WITHOUT DYSPLASIA: Primary | ICD-10-CM

## 2024-03-14 LAB
APPEARANCE FLUID: CLEAR
BILIRUBIN, POC: NORMAL
BLOOD URINE, POC: NORMAL
CLARITY, POC: CLEAR
COLOR, POC: YELLOW
GLUCOSE URINE, POC: NORMAL
KETONES, POC: NORMAL
LEUKOCYTE EST, POC: NORMAL
NITRITE, POC: NORMAL
PH, POC: 6
POST VOID RESIDUAL (PVR): NORMAL ML
PROTEIN, POC: NORMAL
SPECIFIC GRAVITY, POC: 1.03
UROBILINOGEN, POC: 0.2

## 2024-03-14 PROCEDURE — 4004F PT TOBACCO SCREEN RCVD TLK: CPT | Performed by: NURSE PRACTITIONER

## 2024-03-14 PROCEDURE — G8419 CALC BMI OUT NRM PARAM NOF/U: HCPCS | Performed by: NURSE PRACTITIONER

## 2024-03-14 PROCEDURE — 1124F ACP DISCUSS-NO DSCNMKR DOCD: CPT | Performed by: NURSE PRACTITIONER

## 2024-03-14 PROCEDURE — G8484 FLU IMMUNIZE NO ADMIN: HCPCS | Performed by: NURSE PRACTITIONER

## 2024-03-14 PROCEDURE — 99204 OFFICE O/P NEW MOD 45 MIN: CPT | Performed by: NURSE PRACTITIONER

## 2024-03-14 PROCEDURE — 3017F COLORECTAL CA SCREEN DOC REV: CPT | Performed by: NURSE PRACTITIONER

## 2024-03-14 PROCEDURE — G8427 DOCREV CUR MEDS BY ELIG CLIN: HCPCS | Performed by: NURSE PRACTITIONER

## 2024-03-14 RX ORDER — CIPROFLOXACIN 500 MG/1
500 TABLET, FILM COATED ORAL 2 TIMES DAILY
Qty: 8 TABLET | Refills: 0 | Status: SHIPPED | OUTPATIENT
Start: 2024-03-14

## 2024-03-14 ASSESSMENT — ENCOUNTER SYMPTOMS
BACK PAIN: 0
ABDOMINAL DISTENTION: 0
ABDOMINAL PAIN: 0
NAUSEA: 0
VOMITING: 0

## 2024-03-14 NOTE — PROGRESS NOTES
myself, but did discuss findings with the patient.    ASSESSMENT/PLAN  1. Elevated PSA  Patient presents for evaluation of PSA.  Appears this has been rising slowly over the last few years.  He does have a familial history of prostate cancer and some intermediate lesions on prostate MRI.  I discussed these findings with the patient.  I have discussed options of evaluation including continuing to monitor labs versus urine testing versus biopsy.  After discussing all the options in depth, patient wished to move forward with biopsy.  We again discussed the risk and benefits of the procedure.    - AK Measure, post-void residual, US, non-imaging  - POCT Urinalysis no Micro  - ciprofloxacin (CIPRO) 500 MG tablet; Take 1 tablet by mouth 2 times daily Begin taking the day before the biopsy is scheduled.  Dispense: 8 tablet; Refill: 0    2. BPH with obstruction/lower urinary tract symptoms  Lower urinary tract symptoms as described above.  Patient empties his bladder adequately.  He is currently maintained on Flomax.      Orders Placed This Encounter   Procedures    POCT Urinalysis no Micro    AK Measure, post-void residual, US, non-imaging     PVR- 56ml        Return for prostate biopsy.    An electronic signature was used to authenticate this note.    AKUA GEORGES - CNP    All information inputted into the note by the MA to include chief complaint, past medical history, past surgical history, medications, allergies, social and family history and review of systems has been reviewed and updated as needed by me.    EMR Dragon/transcription disclaimer: Much of this document is electronic transcription/translation of spoken language to printed text. The electronic translation of spoken language may be erroneous or, at times, nonsensical words or phrases may be inadvertently transcribed. Although I have reviewed the document for such errors, some may still exist.

## 2024-03-14 NOTE — PROGRESS NOTES
[Atorvastatin]     Penicillins     Influenza Vaccines Nausea And Vomiting and Other (See Comments)    Sodium Chloride Other (See Comments)           Objective:     /80 (Site: Left Upper Arm)   Pulse 81   Ht 1.651 m (5' 5\")   Wt 75.3 kg (166 lb)   SpO2 96%   BMI 27.62 kg/m²     Physical Exam  Vitals reviewed.   Constitutional:       General: He is not in acute distress.     Appearance: He is well-developed.   HENT:      Head: Normocephalic and atraumatic.      Right Ear: External ear normal.      Left Ear: External ear normal.      Nose: Nose normal.   Eyes:      General: No scleral icterus.        Right eye: No discharge.         Left eye: No discharge.      Conjunctiva/sclera: Conjunctivae normal.      Pupils: Pupils are equal, round, and reactive to light.   Cardiovascular:      Rate and Rhythm: Normal rate and regular rhythm.      Heart sounds: Normal heart sounds. No murmur heard.  Pulmonary:      Effort: Pulmonary effort is normal. No respiratory distress.      Breath sounds: Normal breath sounds. No wheezing or rales.   Abdominal:      General: Bowel sounds are normal. There is no distension.      Palpations: Abdomen is soft. There is no mass.      Tenderness: There is no abdominal tenderness. There is no guarding or rebound.   Musculoskeletal:         General: Normal range of motion.      Cervical back: Normal range of motion and neck supple.   Skin:     General: Skin is warm and dry.      Coloration: Skin is not pale.   Neurological:      Mental Status: He is alert and oriented to person, place, and time.   Psychiatric:         Behavior: Behavior normal.

## 2024-03-15 ASSESSMENT — ENCOUNTER SYMPTOMS
RECTAL PAIN: 0
NAUSEA: 0
COUGH: 0
CHOKING: 0
ABDOMINAL PAIN: 0
ABDOMINAL DISTENTION: 0
ANAL BLEEDING: 0
CONSTIPATION: 0
BLOOD IN STOOL: 0
VOMITING: 0
DIARRHEA: 0
SHORTNESS OF BREATH: 0
TROUBLE SWALLOWING: 0

## 2024-04-12 ENCOUNTER — PROCEDURE VISIT (OUTPATIENT)
Dept: UROLOGY | Age: 74
End: 2024-04-12

## 2024-04-12 VITALS — HEIGHT: 65 IN | WEIGHT: 164 LBS | BODY MASS INDEX: 27.32 KG/M2

## 2024-04-12 DIAGNOSIS — R97.20 ELEVATED PSA: Primary | ICD-10-CM

## 2024-04-12 RX ORDER — GENTAMICIN SULFATE 40 MG/ML
80 INJECTION, SOLUTION INTRAMUSCULAR; INTRAVENOUS ONCE
Status: COMPLETED | OUTPATIENT
Start: 2024-04-12 | End: 2024-04-12

## 2024-04-12 RX ADMIN — GENTAMICIN SULFATE 80 MG: 40 INJECTION, SOLUTION INTRAMUSCULAR; INTRAVENOUS at 08:09

## 2024-04-12 NOTE — PROGRESS NOTES
After obtaining consent from patient and receiving verbal/written orders from Dr. Jenkins, I gave patient 2cc of gentamicin injection in Right upper quad. gluteus, for prep of trus bx ,patient tolerated well.  Medication was not supplied by the patient.    Chevy Le is a 73 y.o. male who presents today   Chief Complaint   Patient presents with    Procedure     I'm here today for my trus bx         Elevated PSA:  Patient is here today for history of an elevated PSA.    HPI  Patient presents for evaluation of elevated PSA.  At the time of his visit, I had only his most recent PSA, but have now obtained previous values as noted below.  His PSA is elevated above normal, but not above his age-adjusted normal.  He has lower urinary tract symptoms with complaints of feeling of incomplete emptying, frequency, urgency, urgency, weak stream, straining, nocturia x 4.  Has an AUA symptom score of 20/35 with a bother score of 5.  He is currently maintained on Flomax 0.4 mg.  His primary care provider did also obtain prostate MRI which revealed 2 PI-RADS 3 lesions.  No high risk PI-RADS 4 5 lesions mentioned in the report.  Prostate volume by MRI was 45     He has no prior history of nephrolithiasis, hematuria.  He does have a history of urologic cancer in his father who had prostate cancer in his brother who had kidney cancer.  No bladder cancer in his family.  Patient is a previous smoker with a 20-pack-year history.  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  He reports other exposure to diesel fuel, mineral spirits, either, paint, asbestos, etc.  He does have prior history of CVA in 2004.     PSA                 1/12/24            5.2  PSA                 11/9/2022        4.1  PSA                 7/9/2021          3.6  PSA

## 2024-06-21 ENCOUNTER — INITIAL CONSULT (OUTPATIENT)
Dept: UROLOGY | Age: 74
End: 2024-06-21
Payer: MEDICARE

## 2024-06-21 VITALS — WEIGHT: 164 LBS | BODY MASS INDEX: 27.32 KG/M2 | HEIGHT: 65 IN | TEMPERATURE: 97.3 F

## 2024-06-21 DIAGNOSIS — R35.1 BENIGN PROSTATIC HYPERPLASIA WITH NOCTURIA: ICD-10-CM

## 2024-06-21 DIAGNOSIS — N40.1 BENIGN PROSTATIC HYPERPLASIA WITH NOCTURIA: ICD-10-CM

## 2024-06-21 DIAGNOSIS — C61 PROSTATE CANCER (HCC): Primary | ICD-10-CM

## 2024-06-21 PROCEDURE — G8419 CALC BMI OUT NRM PARAM NOF/U: HCPCS | Performed by: UROLOGY

## 2024-06-21 PROCEDURE — 4004F PT TOBACCO SCREEN RCVD TLK: CPT | Performed by: UROLOGY

## 2024-06-21 PROCEDURE — G8427 DOCREV CUR MEDS BY ELIG CLIN: HCPCS | Performed by: UROLOGY

## 2024-06-21 PROCEDURE — 3017F COLORECTAL CA SCREEN DOC REV: CPT | Performed by: UROLOGY

## 2024-06-21 PROCEDURE — 1124F ACP DISCUSS-NO DSCNMKR DOCD: CPT | Performed by: UROLOGY

## 2024-06-21 PROCEDURE — 99215 OFFICE O/P EST HI 40 MIN: CPT | Performed by: UROLOGY

## 2024-06-21 RX ORDER — TAMSULOSIN HYDROCHLORIDE 0.4 MG/1
0.4 CAPSULE ORAL DAILY
Qty: 90 CAPSULE | Refills: 3 | Status: SHIPPED | OUTPATIENT
Start: 2024-06-21

## 2024-06-21 ASSESSMENT — ENCOUNTER SYMPTOMS
CONSTIPATION: 0
TROUBLE SWALLOWING: 0
BACK PAIN: 0
COUGH: 0
DIARRHEA: 0
NAUSEA: 0
EYE DISCHARGE: 0
EYE REDNESS: 0
ABDOMINAL DISTENTION: 0
SHORTNESS OF BREATH: 0
ABDOMINAL PAIN: 0
VOMITING: 0

## 2024-06-21 NOTE — PROGRESS NOTES
Management options.  Additional time was spent in reviewing his chart the biopsy report and documentation of medical record.    Based on his clinical presentation he has PSA 5.2, [place] 6 grade group 1 and only 1 of 12 cores small volume less than 5% evaluated tissue and clinical stage T1c which puts him at a low risk category.  I did recommend we consider doing Oncotype DX for genetic characterization of his tumor and he was agreeable.  Therefore I recommended he undergo active surveillance.  He is very reluctant to do a repeat biopsy but I told him this can be done in the operating room while he is asleep.  He seems more accepting once he understood this.  We also discussed options of standard IMRT external radiation therapy versus SBRT radiation therapy and surgical treatment with robotic prostatectomy.  He is leaning towards active surveillance which is my recommendation at this time but wants to think about it.  In the meantime we will go ahead and set him up for 3-month follow-up as an off Oncotype DX.  - PSA, Diagnostic; Future    2. Benign prostatic hyperplasia with nocturia  Back on tamsulosin  - tamsulosin (FLOMAX) 0.4 MG capsule; Take 1 capsule by mouth daily  Dispense: 90 capsule; Refill: 3      Orders Placed This Encounter   Procedures    PSA, Diagnostic     PSA in 3 months     Standing Status:   Future     Standing Expiration Date:   6/21/2025        Return in about 3 months (around 9/21/2024) for PSA prior to vext visit.    All information inputted into the note by the MA to include chief complaint, past medical history, past surgical history, medications, allergies, social and family history and review of systems has been reviewed and updated as needed by me.    EMR Dragon/transcription disclaimer: Much of this documentt is electronic  transcription/translation of spoken language to printed text. The  electronic translation of spoken language may be erroneous, or at times,  nonsensical words or

## 2024-08-12 ENCOUNTER — OFFICE VISIT (OUTPATIENT)
Dept: GASTROENTEROLOGY | Facility: CLINIC | Age: 74
End: 2024-08-12
Payer: MEDICARE

## 2024-08-12 VITALS
HEART RATE: 72 BPM | OXYGEN SATURATION: 98 % | WEIGHT: 157 LBS | DIASTOLIC BLOOD PRESSURE: 74 MMHG | SYSTOLIC BLOOD PRESSURE: 128 MMHG | HEIGHT: 65 IN | BODY MASS INDEX: 26.16 KG/M2 | TEMPERATURE: 97 F

## 2024-08-12 DIAGNOSIS — Z80.0 FAMILY HISTORY OF COLON CANCER: Primary | ICD-10-CM

## 2024-08-12 DIAGNOSIS — Z86.010 HISTORY OF COLON POLYPS: ICD-10-CM

## 2024-08-12 DIAGNOSIS — K22.70 BARRETT'S ESOPHAGUS DETERMINED BY BIOPSY: ICD-10-CM

## 2024-08-12 PROCEDURE — 3074F SYST BP LT 130 MM HG: CPT | Performed by: NURSE PRACTITIONER

## 2024-08-12 PROCEDURE — 3078F DIAST BP <80 MM HG: CPT | Performed by: NURSE PRACTITIONER

## 2024-08-12 PROCEDURE — 1159F MED LIST DOCD IN RCRD: CPT | Performed by: NURSE PRACTITIONER

## 2024-08-12 PROCEDURE — 99214 OFFICE O/P EST MOD 30 MIN: CPT | Performed by: NURSE PRACTITIONER

## 2024-08-12 PROCEDURE — 1160F RVW MEDS BY RX/DR IN RCRD: CPT | Performed by: NURSE PRACTITIONER

## 2024-08-12 RX ORDER — ONDANSETRON 4 MG/1
4 TABLET, FILM COATED ORAL EVERY 8 HOURS PRN
Qty: 10 TABLET | Refills: 0 | Status: SHIPPED | OUTPATIENT
Start: 2024-08-12

## 2024-08-12 NOTE — PROGRESS NOTES
"Chief Complaint   Patient presents with    Colonoscopy     8-27-19 colon 5 year recall polyps    Endoscopy     9-26-22 endo       PCP: Oneil Awad MD  REFER: No ref. provider found    Subjective     HPI    Laureano aSunders is a 74 y.o. male who presents to office for preventative maintenance.  There is  a personal history of colon polyps.   He does not have complaints of nausea/vomiting, change in bowels, weight loss, no BRBPR, no melena.  There is (father) a family history of colon cancer in first degree relative.    Laureano Saunders last colonoscopy-2019 .  Bowels do move on regular basis.    UPPER GI ENDOSCOPY (09/26/2022 07:32) -3 years     UPPER GI ENDOSCOPY (08/29/2019 08:09)  Tissue Pathology Exam (08/29/2019 08:53)-positive intestinal metaplasia, no dysplasia     ENDOSCOPY, INT (06/19/2015 00:00)     COLONOSCOPY (08/27/2019 08:21)-polyp in cecum   - (5 years)     SCANNED - COLONOSCOPY (09/01/2011 00:00)      Lab Results - Last 18 Months   Lab Units 02/23/24  0749   CREATININE mg/dL 1.20       No results for input(s): \"EGFRIFNONA\", \"EGFRIFAFRI\", \"EGFRRESULT\" in the last 65623 hours.     Past Medical History:   Diagnosis Date    Allergic rhinitis     Ankylosis     CVA (cerebral vascular accident)     DDD (degenerative disc disease), lumbar     GERD (gastroesophageal reflux disease)     Hypercholesteremia     Hypertension     Radiculopathy, lumbar region     Scalp psoriasis     Vertigo      Past Surgical History:   Procedure Laterality Date    COLONOSCOPY  09/01/2011    normal    COLONOSCOPY N/A 8/27/2019    Procedure: COLONOSCOPY WITH ANESTHESIA;  Surgeon: Reuben Gruber DO;  Location: [facility] ENDOSCOPY;  Service: Gastroenterology    ENDOSCOPY  06/19/2015    stable dos santos esophagus    ENDOSCOPY N/A 8/29/2019    Procedure: ESOPHAGOGASTRODUODENOSCOPY WITH ANESTHESIA;  Surgeon: Reuben Gruber DO;  Location: [facility] ENDOSCOPY;  Service: Gastroenterology    ENDOSCOPY N/A 9/26/2022    Procedure: " ESOPHAGOGASTRODUODENOSCOPY WITH ANESTHESIA;  Surgeon: Reuben Gruber DO;  Location: [facility] ENDOSCOPY;  Service: Gastroenterology;  Laterality: N/A;  preop; GERD  postop; r/o barretts   PCP [facility]     EYE SURGERY      KNEE ARTHROSCOPY      right knee x 2    NECK SURGERY      ROTATOR CUFF REPAIR       Outpatient Medications Marked as Taking for the 8/12/24 encounter (Office Visit) with Zoran Perez APRN   Medication Sig Dispense Refill    aspirin 81 MG chewable tablet Chew 1 tablet Daily.      diclofenac (VOLTAREN) 75 MG EC tablet TAKE ONE TABLET BY MOUTH TWICE DAILY 60 tablet 0    losartan-hydrochlorothiazide (HYZAAR) 50-12.5 MG per tablet Take 1 tablet by mouth Daily.  3    NIFEdipine XL (PROCARDIA XL) 90 MG 24 hr tablet Take 1 tablet by mouth Daily.      omeprazole (PriLOSEC) 40 MG capsule Take 1 capsule by mouth Daily.      simvastatin (ZOCOR) 40 MG tablet Take 1 tablet by mouth Every Night.       Allergies   Allergen Reactions    Penicillins Hives    Terramycin [Oxytetracycline] Hives    Codeine Irritability    Influenza Vaccines GI Intolerance    Lipitor [Atorvastatin] Myalgia    Mycinaire Nasal Mist [Sodium Chloride] Unknown - Low Severity     Social History     Socioeconomic History    Marital status:    Tobacco Use    Smoking status: Former     Types: Cigarettes     Passive exposure: Past    Smokeless tobacco: Current     Types: Chew   Vaping Use    Vaping status: Never Used   Substance and Sexual Activity    Alcohol use: No    Drug use: No    Sexual activity: Defer     Review of Systems   Constitutional:  Negative for fever and unexpected weight change.   HENT:  Negative for trouble swallowing.    Respiratory:  Negative for shortness of breath.    Cardiovascular:  Negative for chest pain.   Gastrointestinal:  Negative for abdominal pain and anal bleeding.     Objective   Vitals:    08/12/24 1223   BP: 128/74   Pulse: 72   Temp: 97 °F (36.1 °C)   SpO2: 98%     Physical  Exam  Constitutional:       Appearance: Normal appearance. He is well-developed.   Eyes:      General: No scleral icterus.  Cardiovascular:      Heart sounds: Normal heart sounds. No murmur heard.  Pulmonary:      Effort: Pulmonary effort is normal.   Abdominal:      General: Bowel sounds are normal. There is no distension.      Palpations: Abdomen is soft.      Tenderness: There is no abdominal tenderness. There is no guarding.   Skin:     General: Skin is warm and dry.      Coloration: Skin is not jaundiced.   Neurological:      Mental Status: He is alert.   Psychiatric:         Behavior: Behavior is cooperative.       Imaging Results (Most Recent)       None          Body mass index is 26.13 kg/m².    Assessment & Plan   Diagnoses and all orders for this visit:    1. Family history of colon cancer (Primary)    2. History of colon polyps    3. Michel's esophagus determined by biopsy      * Surgery not found *    Miralax prep     Laureano Saunders acid reflux controlled with current PPI, no dysphagia.  EGD due 2025, earlier if clinically indicated    Advised pt to stop use of NSAIDs, Fish Oil, and MV 5 days prior to procedure, per Dr Gruber protocol.  Tylenol based products are ok to take.  Pt verbalized understanding.     All risks, benefits, alternatives, and indications of colonoscopy procedure have been discussed with the patient. Risks to include perforation of the colon requiring possible surgery or colostomy, risk of bleeding from biopsies or removal of colon tissue, possibility of missing a colon polyp or cancer, or adverse drug reaction.  Benefits to include the diagnosis and management of disease of the colon and rectum. Alternatives to include barium enema, radiographic evaluation, lab testing or no intervention. He verbalizes understanding and agrees.     I have explained having an adequate and complete prep is associated with success of colonoscopy.   I have explained to Laureano Saunders that not having an  adequate bowel prep can lead to decreased rate of  adenoma detection, longer procedure times, and higher chance the physician will not be able to successfully complete full colonoscopy (able to intubate cecum).   I have discussed bowel prep options clenpiq, miralax prep, suprep, plenvu, sutab, and Golytley.  Laureano Saunders has elected to proceed with miralax prep.   I have also discussed that there are a variety of prep options however prep that is prescribed is influenced on patient health history, how well bowels move on regular basis, and individualized insurance plan.  I have explained that we are not able to know the amount of coverage each individual insurance plan provides for preps.  I asked Laureano Saunders to please call our office if we need to send prescription/provide instructions for another prep due to costs/insurance coverage.      Zoran Perez, APRN  08/12/24        There are no Patient Instructions on file for this visit.

## 2024-08-12 NOTE — H&P (VIEW-ONLY)
"Chief Complaint   Patient presents with    Colonoscopy     8-27-19 colon 5 year recall polyps    Endoscopy     9-26-22 endo       PCP: Oneil Awad MD  REFER: No ref. provider found    Subjective     HPI    Laureano Saunders is a 74 y.o. male who presents to office for preventative maintenance.  There is  a personal history of colon polyps.   He does not have complaints of nausea/vomiting, change in bowels, weight loss, no BRBPR, no melena.  There is (father) a family history of colon cancer in first degree relative.    Laureano Saunders last colonoscopy-2019 .  Bowels do move on regular basis.    UPPER GI ENDOSCOPY (09/26/2022 07:32) -3 years     UPPER GI ENDOSCOPY (08/29/2019 08:09)  Tissue Pathology Exam (08/29/2019 08:53)-positive intestinal metaplasia, no dysplasia     ENDOSCOPY, INT (06/19/2015 00:00)     COLONOSCOPY (08/27/2019 08:21)-polyp in cecum   - (5 years)     SCANNED - COLONOSCOPY (09/01/2011 00:00)      Lab Results - Last 18 Months   Lab Units 02/23/24  0749   CREATININE mg/dL 1.20       No results for input(s): \"EGFRIFNONA\", \"EGFRIFAFRI\", \"EGFRRESULT\" in the last 84894 hours.     Past Medical History:   Diagnosis Date    Allergic rhinitis     Ankylosis     CVA (cerebral vascular accident)     DDD (degenerative disc disease), lumbar     GERD (gastroesophageal reflux disease)     Hypercholesteremia     Hypertension     Radiculopathy, lumbar region     Scalp psoriasis     Vertigo      Past Surgical History:   Procedure Laterality Date    COLONOSCOPY  09/01/2011    normal    COLONOSCOPY N/A 8/27/2019    Procedure: COLONOSCOPY WITH ANESTHESIA;  Surgeon: Reuben Gruber DO;  Location: [facility] ENDOSCOPY;  Service: Gastroenterology    ENDOSCOPY  06/19/2015    stable dos santos esophagus    ENDOSCOPY N/A 8/29/2019    Procedure: ESOPHAGOGASTRODUODENOSCOPY WITH ANESTHESIA;  Surgeon: Reuben Gruber DO;  Location: [facility] ENDOSCOPY;  Service: Gastroenterology    ENDOSCOPY N/A 9/26/2022    Procedure: " ESOPHAGOGASTRODUODENOSCOPY WITH ANESTHESIA;  Surgeon: Reuben Gruber DO;  Location: [facility] ENDOSCOPY;  Service: Gastroenterology;  Laterality: N/A;  preop; GERD  postop; r/o barretts   PCP [facility]     EYE SURGERY      KNEE ARTHROSCOPY      right knee x 2    NECK SURGERY      ROTATOR CUFF REPAIR       Outpatient Medications Marked as Taking for the 8/12/24 encounter (Office Visit) with Zoran Perez APRN   Medication Sig Dispense Refill    aspirin 81 MG chewable tablet Chew 1 tablet Daily.      diclofenac (VOLTAREN) 75 MG EC tablet TAKE ONE TABLET BY MOUTH TWICE DAILY 60 tablet 0    losartan-hydrochlorothiazide (HYZAAR) 50-12.5 MG per tablet Take 1 tablet by mouth Daily.  3    NIFEdipine XL (PROCARDIA XL) 90 MG 24 hr tablet Take 1 tablet by mouth Daily.      omeprazole (PriLOSEC) 40 MG capsule Take 1 capsule by mouth Daily.      simvastatin (ZOCOR) 40 MG tablet Take 1 tablet by mouth Every Night.       Allergies   Allergen Reactions    Penicillins Hives    Terramycin [Oxytetracycline] Hives    Codeine Irritability    Influenza Vaccines GI Intolerance    Lipitor [Atorvastatin] Myalgia    Mycinaire Nasal Mist [Sodium Chloride] Unknown - Low Severity     Social History     Socioeconomic History    Marital status:    Tobacco Use    Smoking status: Former     Types: Cigarettes     Passive exposure: Past    Smokeless tobacco: Current     Types: Chew   Vaping Use    Vaping status: Never Used   Substance and Sexual Activity    Alcohol use: No    Drug use: No    Sexual activity: Defer     Review of Systems   Constitutional:  Negative for fever and unexpected weight change.   HENT:  Negative for trouble swallowing.    Respiratory:  Negative for shortness of breath.    Cardiovascular:  Negative for chest pain.   Gastrointestinal:  Negative for abdominal pain and anal bleeding.     Objective   Vitals:    08/12/24 1223   BP: 128/74   Pulse: 72   Temp: 97 °F (36.1 °C)   SpO2: 98%     Physical  Exam  Constitutional:       Appearance: Normal appearance. He is well-developed.   Eyes:      General: No scleral icterus.  Cardiovascular:      Heart sounds: Normal heart sounds. No murmur heard.  Pulmonary:      Effort: Pulmonary effort is normal.   Abdominal:      General: Bowel sounds are normal. There is no distension.      Palpations: Abdomen is soft.      Tenderness: There is no abdominal tenderness. There is no guarding.   Skin:     General: Skin is warm and dry.      Coloration: Skin is not jaundiced.   Neurological:      Mental Status: He is alert.   Psychiatric:         Behavior: Behavior is cooperative.       Imaging Results (Most Recent)       None          Body mass index is 26.13 kg/m².    Assessment & Plan   Diagnoses and all orders for this visit:    1. Family history of colon cancer (Primary)    2. History of colon polyps    3. Michel's esophagus determined by biopsy      * Surgery not found *    Miralax prep     Laureano Saunders acid reflux controlled with current PPI, no dysphagia.  EGD due 2025, earlier if clinically indicated    Advised pt to stop use of NSAIDs, Fish Oil, and MV 5 days prior to procedure, per Dr Gruber protocol.  Tylenol based products are ok to take.  Pt verbalized understanding.     All risks, benefits, alternatives, and indications of colonoscopy procedure have been discussed with the patient. Risks to include perforation of the colon requiring possible surgery or colostomy, risk of bleeding from biopsies or removal of colon tissue, possibility of missing a colon polyp or cancer, or adverse drug reaction.  Benefits to include the diagnosis and management of disease of the colon and rectum. Alternatives to include barium enema, radiographic evaluation, lab testing or no intervention. He verbalizes understanding and agrees.     I have explained having an adequate and complete prep is associated with success of colonoscopy.   I have explained to Laureano Saunders that not having an  adequate bowel prep can lead to decreased rate of  adenoma detection, longer procedure times, and higher chance the physician will not be able to successfully complete full colonoscopy (able to intubate cecum).   I have discussed bowel prep options clenpiq, miralax prep, suprep, plenvu, sutab, and Golytley.  Laureano Saunders has elected to proceed with miralax prep.   I have also discussed that there are a variety of prep options however prep that is prescribed is influenced on patient health history, how well bowels move on regular basis, and individualized insurance plan.  I have explained that we are not able to know the amount of coverage each individual insurance plan provides for preps.  I asked Laureano Saunders to please call our office if we need to send prescription/provide instructions for another prep due to costs/insurance coverage.      Zoran Perez, APRN  08/12/24        There are no Patient Instructions on file for this visit.

## 2024-08-23 ENCOUNTER — TELEPHONE (OUTPATIENT)
Dept: GASTROENTEROLOGY | Age: 74
End: 2024-08-23

## 2024-09-04 ENCOUNTER — ANESTHESIA (OUTPATIENT)
Dept: GASTROENTEROLOGY | Facility: HOSPITAL | Age: 74
End: 2024-09-04
Payer: MEDICARE

## 2024-09-04 ENCOUNTER — ANESTHESIA EVENT (OUTPATIENT)
Dept: GASTROENTEROLOGY | Facility: HOSPITAL | Age: 74
End: 2024-09-04
Payer: MEDICARE

## 2024-09-04 ENCOUNTER — HOSPITAL ENCOUNTER (OUTPATIENT)
Facility: HOSPITAL | Age: 74
Setting detail: HOSPITAL OUTPATIENT SURGERY
Discharge: HOME | End: 2024-09-04
Attending: INTERNAL MEDICINE | Admitting: INTERNAL MEDICINE
Payer: MEDICARE

## 2024-09-04 VITALS
RESPIRATION RATE: 16 BRPM | HEIGHT: 65 IN | WEIGHT: 160 LBS | DIASTOLIC BLOOD PRESSURE: 83 MMHG | HEART RATE: 68 BPM | BODY MASS INDEX: 26.66 KG/M2 | SYSTOLIC BLOOD PRESSURE: 121 MMHG | TEMPERATURE: 96.7 F | OXYGEN SATURATION: 95 %

## 2024-09-04 DIAGNOSIS — Z80.0 FAMILY HISTORY OF COLON CANCER: ICD-10-CM

## 2024-09-04 PROCEDURE — G0105 COLORECTAL SCRN; HI RISK IND: HCPCS | Performed by: INTERNAL MEDICINE

## 2024-09-04 PROCEDURE — 25010000002 PROPOFOL 1000 MG/100ML EMULSION

## 2024-09-04 PROCEDURE — 25810000003 SODIUM CHLORIDE 0.9 % SOLUTION: Performed by: NURSE ANESTHETIST, CERTIFIED REGISTERED

## 2024-09-04 RX ORDER — PROPOFOL 10 MG/ML
INJECTION, EMULSION INTRAVENOUS AS NEEDED
Status: DISCONTINUED | OUTPATIENT
Start: 2024-09-04 | End: 2024-09-04 | Stop reason: SURG

## 2024-09-04 RX ORDER — SODIUM CHLORIDE 9 MG/ML
500 INJECTION, SOLUTION INTRAVENOUS CONTINUOUS PRN
Status: DISCONTINUED | OUTPATIENT
Start: 2024-09-04 | End: 2024-09-04 | Stop reason: HOSPADM

## 2024-09-04 RX ORDER — SODIUM CHLORIDE 0.9 % (FLUSH) 0.9 %
10 SYRINGE (ML) INJECTION AS NEEDED
Status: DISCONTINUED | OUTPATIENT
Start: 2024-09-04 | End: 2024-09-04 | Stop reason: HOSPADM

## 2024-09-04 RX ADMIN — SODIUM CHLORIDE 500 ML: 9 INJECTION, SOLUTION INTRAVENOUS at 07:28

## 2024-09-04 RX ADMIN — LIDOCAINE HYDROCHLORIDE 50 MG: 20 INJECTION, SOLUTION INTRAVENOUS at 08:34

## 2024-09-04 RX ADMIN — PROPOFOL 200 MG: 10 INJECTION, EMULSION INTRAVENOUS at 08:34

## 2024-09-04 NOTE — ANESTHESIA POSTPROCEDURE EVALUATION
"Patient: Laureano Saunders    Procedure Summary       Date: 09/04/24 Room / Location: [facility] ENDOSCOPY 4 / [facility] ENDOSCOPY    Anesthesia Start: 0832 Anesthesia Stop: 0847    Procedure: COLONOSCOPY WITH ANESTHESIA Diagnosis:       Family history of colon cancer      (Family history of colon cancer [Z80.0])    Surgeons: Reuben Gruber DO Provider: Anoop Fay CRNA    Anesthesia Type: MAC ASA Status: 3            Anesthesia Type: MAC    Vitals  Vitals Value Taken Time   /83 09/04/24 0901   Temp     Pulse 65 09/04/24 0901   Resp 16 09/04/24 0900   SpO2 95 % 09/04/24 0901   Vitals shown include unfiled device data.        Post Anesthesia Care and Evaluation    Patient location during evaluation: PHASE II  Patient participation: complete - patient participated  Level of consciousness: awake and alert  Pain score: 0  Pain management: adequate    Airway patency: patent  Anesthetic complications: No anesthetic complications  PONV Status: none  Cardiovascular status: stable and acceptable  Respiratory status: acceptable  Hydration status: acceptable    Comments: Blood pressure 121/83, pulse 68, temperature 96.7 °F (35.9 °C), temperature source Temporal, resp. rate 16, height 165.1 cm (65\"), weight 72.6 kg (160 lb), SpO2 95%.    No anesthesia care post op    "

## 2024-09-04 NOTE — ANESTHESIA PREPROCEDURE EVALUATION
Anesthesia Evaluation     history of anesthetic complications:  PONV  NPO Solid Status: > 8 hours  NPO Liquid Status: > 2 hours           Airway   Mallampati: II  TM distance: >3 FB  No difficulty expected  Dental      Pulmonary    (-) sleep apnea, not a smoker  Cardiovascular   Exercise tolerance: good (4-7 METS)    (+) hypertension, hyperlipidemia  (-) CAD      Neuro/Psych  (+) CVA, dizziness/light headedness, numbness  GI/Hepatic/Renal/Endo    (+) GERD  (-) liver disease, no renal disease, diabetes    Musculoskeletal     Abdominal    Substance History      OB/GYN          Other   arthritis,                 Anesthesia Plan    ASA 3     MAC     intravenous induction     Anesthetic plan, risks, benefits, and alternatives have been provided, discussed and informed consent has been obtained with: patient.    CODE STATUS:

## 2024-09-06 ENCOUNTER — TELEPHONE (OUTPATIENT)
Dept: GASTROENTEROLOGY | Facility: CLINIC | Age: 74
End: 2024-09-06
Payer: MEDICARE

## 2024-10-16 NOTE — PROGRESS NOTES
Subjective    Mr. Saundres is 74 y.o. male    Chief Complaint: Prostate Cancer     History of Present Illness  Patient here for prostate cancer.  Prostate cancer was diagnosed in April 2024 by Dr. Man.  PSA at the time was 5.2.  He underwent MRI February 2024 showed a 45 cc prostate.  He had a PI-RADS 3 lesion in the left mid transition zone and a another lesion in the left base central zone.  Systematic biopsy revealed [place] 3+3 equal 6 1 out of 12 cores of the left lateral apex 5% of core involvement.  AUA symptom score 25/35 on Flomax.   Baseline ED.   PSA 6.1 (7/4/2024)  PSA 5.2 (1/13/2024)    The following portions of the patient's history were reviewed and updated as appropriate: allergies, current medications, past family history, past medical history, past social history, past surgical history and problem list.    Review of Systems      Current Outpatient Medications:     aspirin 81 MG chewable tablet, Chew 1 tablet Daily., Disp: , Rfl:     diclofenac (VOLTAREN) 75 MG EC tablet, TAKE ONE TABLET BY MOUTH TWICE DAILY, Disp: 60 tablet, Rfl: 0    losartan-hydrochlorothiazide (HYZAAR) 50-12.5 MG per tablet, Take 1 tablet by mouth Daily., Disp: , Rfl: 3    NIFEdipine XL (PROCARDIA XL) 90 MG 24 hr tablet, Take 1 tablet by mouth Daily., Disp: , Rfl:     omeprazole (PriLOSEC) 40 MG capsule, Take 1 capsule by mouth Daily., Disp: , Rfl:     ondansetron (Zofran) 4 MG tablet, Take 1 tablet by mouth Every 8 (Eight) Hours As Needed for Nausea or Vomiting., Disp: 10 tablet, Rfl: 0    simvastatin (ZOCOR) 40 MG tablet, Take 1 tablet by mouth Every Night., Disp: , Rfl:     tamsulosin (FLOMAX) 0.4 MG capsule 24 hr capsule, Take 2 capsules by mouth Daily., Disp: , Rfl:     Past Medical History:   Diagnosis Date    Allergic rhinitis     Ankylosis     Cancer     prostate    CVA (cerebral vascular accident)     DDD (degenerative disc disease), lumbar     GERD (gastroesophageal reflux disease)     Hypercholesteremia      "Hypertension     Radiculopathy, lumbar region     Scalp psoriasis     Vertigo        Past Surgical History:   Procedure Laterality Date    COLONOSCOPY  09/01/2011    normal    COLONOSCOPY N/A 8/27/2019    Procedure: COLONOSCOPY WITH ANESTHESIA;  Surgeon: Reuben Gruber DO;  Location:  [facility];  Service: Gastroenterology    COLONOSCOPY N/A 9/4/2024    Procedure: COLONOSCOPY WITH ANESTHESIA;  Surgeon: Reuben Gruber DO;  Location:  [facility];  Service: Gastroenterology;  Laterality: N/A;  pre: family hx colon cancer  post: diverticulosis  [place]    ENDOSCOPY  06/19/2015    stable dos santos esophagus    ENDOSCOPY N/A 8/29/2019    Procedure: ESOPHAGOGASTRODUODENOSCOPY WITH ANESTHESIA;  Surgeon: Reuben Gruber DO;  Location:  [facility];  Service: Gastroenterology    ENDOSCOPY N/A 9/26/2022    Procedure: ESOPHAGOGASTRODUODENOSCOPY WITH ANESTHESIA;  Surgeon: Reuben Gruber DO;  Location:  [facility];  Service: Gastroenterology;  Laterality: N/A;  preop; GERD  postop; r/o barretts   [facility]     EYE SURGERY      KNEE ARTHROSCOPY      right knee x 2    NECK SURGERY      ROTATOR CUFF REPAIR         Social History     Socioeconomic History    Marital status:    Tobacco Use    Smoking status: Former     Types: Cigarettes     Passive exposure: Past    Smokeless tobacco: Current     Types: Chew   Vaping Use    Vaping status: Never Used   Substance and Sexual Activity    Alcohol use: No    Drug use: No    Sexual activity: Defer       Family History   Problem Relation Age of Onset    Heart disease Mother     Hypertension Mother     Colon cancer Father     Hypertension Father     Heart disease Father     Cancer Father     Hypertension Sister     Diabetes Sister     Hypertension Brother     Diabetes Paternal Grandmother     Colon polyps Neg Hx     Esophageal cancer Neg Hx        Objective    Temp 97.6 °F (36.4 °C)   Ht 165.1 cm (65\")   Wt 77.2 kg (170 lb 3.2 oz)   BMI 28.32 " kg/m²     Physical Exam        Results for orders placed or performed in visit on 10/21/24   POC Urinalysis Dipstick, Multipro    Collection Time: 10/21/24  9:03 AM    Specimen: Urine   Result Value Ref Range    Color Yellow Yellow, Straw, Dark Yellow, Sussy    Clarity, UA Clear Clear    Glucose, UA Negative Negative mg/dL    Bilirubin Negative Negative    Ketones, UA Negative Negative    Specific Gravity  1.020 1.005 - 1.030    Blood, UA Negative Negative    pH, Urine 65.0 (A) 5.0 - 8.0    Protein, POC Trace (A) Negative mg/dL    Urobilinogen, UA 0.2 E.U./dL Normal, 0.2 E.U./dL    Nitrite, UA Negative Negative    Leukocytes Negative Negative     IPSS Questionnaire (AUA-7):  Incomplete emptying  Over the past month, how often have you had a sensation of not emptying your bladder completely after you finished urinating?: More than half the time (10/21/24 0857)  Frequency  Over the past month, how often have you had to urinate again less than two hours after you finishied urinating ?: Almost always (10/21/24 0857)  Intermittency  Over the past month, how often have you found you stopped and started again several time when you urinated ?: Almost always (10/21/24 0857)  Urgency  Over the last month, how often have you found it difficult  have you found it difficult to postpone urination ?: Less than half the time (10/21/24 0857)  Weak Stream  Over the past month, how often have you had a weak urinary stream ?: More than half the time (10/21/24 0857)  Straining  Over the past month, how often have you had to push or strain to begin urination ?: Less than 1 time in 5 (10/21/24 0857)  Nocturia  Over the past month, how many times did you most typically get up to urinate from the time you went to bed until the time you got up in the morning ?: 4 times (10/21/24 0857)  Quality of life due to urinary symptoms  If you were to spend the rest of your life with your urinary condition the way it is now, how would feel about that?:  Unhappy (10/21/24 0857)    Scores  Total IPSS Score: (!) 25 (10/21/24 0857)  Total Score = Symptomatic Level: Severely symptomatic: 20-35 (10/21/24 0857)        Estimation of residual urine via abdominal ultrasound  Residual Urine: 64 ml  Indication: Urinary Frequency  Position: Supine  Examination: Incremental scanning of the suprapubic area using 3 MHz transducer using copious amounts of acoustic gel.   Findings: An anechoic area was demonstrated which represented the bladder, with measurement of residual urine as noted. I inspected this myself. In that the residual urine was stable or insignificant, no treatment will be necessary at this time.       Assessment and Plan    Diagnoses and all orders for this visit:    1. Prostate cancer (Primary)  -     POC Urinalysis Dipstick, Multipro  -     Case Request; Standing  -     sodium chloride 0.9 % flush 10 mL  -     sodium chloride 0.9 % flush 10 mL  -     ceFAZolin (ANCEF) 2,000 mg in sodium chloride 0.9 % 100 mL IVPB  -     Case Request    Other orders  -     Follow Anesthesia Guidelines / Protocol; Future  -     Follow Anesthesia Guidelines / Protocol; Standing  -     Verify / Perform Chlorhexidine Skin Prep; Standing  -     Provide NPO Instructions to Patient; Future  -     Chlorhexidine Skin Prep; Future  -     Insert Peripheral IV; Standing  -     Saline Lock & Maintain IV Access; Standing  -     Place Sequential Compression Device; Standing  -     Maintain Sequential Compression Device; Standing        Reviewed records from Dr. Man summarized in HPI.    We discussed active surveillance in depth today.  Patient is very hesitant for active surveillance due to the mental strain of having a diagnosis of cancer as well as repeat biopsy and his voiding symptoms.  I do not think he would be best suited for radiation given his serious voiding symptoms.  We discussed options he is opted for robotic assisted lap prostatectomy.  All questions were answered.  Will  proceed with this in the near future.    I had a long discussion with the patient today regarding his low risk prostate cancer.  I discussed the natural history of prostate cancer.  We discussed NCCN and AUA guidelines.  I discussed that he has for treatment options including active surveillance, robotic-assisted lap prostatectomy, external beam radiation therapy, and brachytherapy.  I discussed the ins and outs of each treatment as well as side effects associated with surgical and radiation therapy.    I discussed the active surveillance protocol consists digital rectal exam and PSA every 6 months.  We discussed that he would need a confirmatory biopsy within 1 year.  We discussed that I would use MRI technology to help follow this tumor.  We discussed reasons for coming off active surveillance and proceeding with treatment.  We also discussed the small chance of developing metastatic disease while on active surveillance.    I discussed robotic-assisted lap prostatectomy.  I discussed side effects including stress urinary continence and erectile dysfunction.  We discussed timing and rate of return of continence and erections.  I discussed that most patients have a catheter in for 1 week.  I discussed that most patients spin the night one night following the procedure.  We discussed risks including but not limited to anastomotic leak, bladder neck contracture, rectal injury requiring closure versus colostomy, permanent erectile dysfunction, stress urinary incontinence requiring further surgery and other risks associated with anesthesia.    I also discussed brachytherapy.  I discussed irritative voiding symptoms of frequency and urgency and nocturia.  We also discussed risks of urethral stricture disease.  We also discussed issues regarding erectile dysfunction as well as proctitis.    I discussed external beam radiation therapy.  I discussed that we do IMR T here.  I discussed there is no evidence to support proton  therapy over IMRT.  I discussed the worsening of voiding symptoms including frequency urgency and nocturia as well as weak urinary stream.  We discussed issues related to proctitis.      I briefly discussed that HIFU is not considered standard of care for treatment of prostate cancer.  I briefly discussed the cryosurgery result in significant side effects.

## 2024-10-21 ENCOUNTER — OFFICE VISIT (OUTPATIENT)
Dept: UROLOGY | Facility: CLINIC | Age: 74
End: 2024-10-21
Payer: MEDICARE

## 2024-10-21 VITALS — BODY MASS INDEX: 28.36 KG/M2 | HEIGHT: 65 IN | TEMPERATURE: 97.6 F | WEIGHT: 170.2 LBS

## 2024-10-21 DIAGNOSIS — C61 PROSTATE CANCER: Primary | ICD-10-CM

## 2024-10-21 LAB
BILIRUB BLD-MCNC: NEGATIVE MG/DL
CLARITY, POC: CLEAR
COLOR UR: YELLOW
GLUCOSE UR STRIP-MCNC: NEGATIVE MG/DL
KETONES UR QL: NEGATIVE
LEUKOCYTE EST, POC: NEGATIVE
NITRITE UR-MCNC: NEGATIVE MG/ML
PH UR: 65 [PH] (ref 5–8)
PROT UR STRIP-MCNC: ABNORMAL MG/DL
RBC # UR STRIP: NEGATIVE /UL
SP GR UR: 1.02 (ref 1–1.03)
UROBILINOGEN UR QL: ABNORMAL

## 2024-10-21 PROCEDURE — 81001 URINALYSIS AUTO W/SCOPE: CPT | Performed by: UROLOGY

## 2024-10-21 PROCEDURE — 1159F MED LIST DOCD IN RCRD: CPT | Performed by: UROLOGY

## 2024-10-21 PROCEDURE — 51798 US URINE CAPACITY MEASURE: CPT | Performed by: UROLOGY

## 2024-10-21 PROCEDURE — 1160F RVW MEDS BY RX/DR IN RCRD: CPT | Performed by: UROLOGY

## 2024-10-21 PROCEDURE — 99204 OFFICE O/P NEW MOD 45 MIN: CPT | Performed by: UROLOGY

## 2024-10-21 RX ORDER — SODIUM CHLORIDE 0.9 % (FLUSH) 0.9 %
10 SYRINGE (ML) INJECTION EVERY 12 HOURS SCHEDULED
OUTPATIENT
Start: 2024-10-21

## 2024-10-21 RX ORDER — SODIUM CHLORIDE 0.9 % (FLUSH) 0.9 %
10 SYRINGE (ML) INJECTION AS NEEDED
OUTPATIENT
Start: 2024-10-21

## 2024-10-21 RX ORDER — TAMSULOSIN HYDROCHLORIDE 0.4 MG/1
2 CAPSULE ORAL DAILY
COMMUNITY

## 2024-11-26 ENCOUNTER — PRE-ADMISSION TESTING (OUTPATIENT)
Dept: PREADMISSION TESTING | Facility: HOSPITAL | Age: 74
End: 2024-11-26
Payer: MEDICARE

## 2024-11-26 VITALS
WEIGHT: 170.42 LBS | BODY MASS INDEX: 29.09 KG/M2 | DIASTOLIC BLOOD PRESSURE: 94 MMHG | SYSTOLIC BLOOD PRESSURE: 169 MMHG | HEART RATE: 82 BPM | HEIGHT: 64 IN | RESPIRATION RATE: 18 BRPM | OXYGEN SATURATION: 94 %

## 2024-11-26 LAB
ANION GAP SERPL CALCULATED.3IONS-SCNC: 12 MMOL/L (ref 5–15)
BUN SERPL-MCNC: 18 MG/DL (ref 8–23)
BUN/CREAT SERPL: 15.5 (ref 7–25)
CALCIUM SPEC-SCNC: 8.4 MG/DL (ref 8.6–10.5)
CHLORIDE SERPL-SCNC: 108 MMOL/L (ref 98–107)
CO2 SERPL-SCNC: 23 MMOL/L (ref 22–29)
CREAT SERPL-MCNC: 1.16 MG/DL (ref 0.76–1.27)
DEPRECATED RDW RBC AUTO: 45.4 FL (ref 37–54)
EGFRCR SERPLBLD CKD-EPI 2021: 66.9 ML/MIN/1.73
ERYTHROCYTE [DISTWIDTH] IN BLOOD BY AUTOMATED COUNT: 13.8 % (ref 12.3–15.4)
GLUCOSE SERPL-MCNC: 109 MG/DL (ref 65–99)
HCT VFR BLD AUTO: 45.3 % (ref 37.5–51)
HGB BLD-MCNC: 14.9 G/DL (ref 13–17.7)
MCH RBC QN AUTO: 29.4 PG (ref 26.6–33)
MCHC RBC AUTO-ENTMCNC: 32.9 G/DL (ref 31.5–35.7)
MCV RBC AUTO: 89.5 FL (ref 79–97)
PLATELET # BLD AUTO: 206 10*3/MM3 (ref 140–450)
PMV BLD AUTO: 9.6 FL (ref 6–12)
POTASSIUM SERPL-SCNC: 3.6 MMOL/L (ref 3.5–5.2)
RBC # BLD AUTO: 5.06 10*6/MM3 (ref 4.14–5.8)
SODIUM SERPL-SCNC: 143 MMOL/L (ref 136–145)
WBC NRBC COR # BLD AUTO: 5.8 10*3/MM3 (ref 3.4–10.8)

## 2024-11-26 PROCEDURE — 80048 BASIC METABOLIC PNL TOTAL CA: CPT

## 2024-11-26 PROCEDURE — 85027 COMPLETE CBC AUTOMATED: CPT

## 2024-11-26 PROCEDURE — 36415 COLL VENOUS BLD VENIPUNCTURE: CPT

## 2024-11-26 RX ORDER — LOSARTAN POTASSIUM 50 MG/1
50 TABLET ORAL DAILY
COMMUNITY

## 2024-11-26 NOTE — DISCHARGE INSTRUCTIONS
Preparing for Surgery  Follow these instructions before the procedure:  Several days or weeks before your procedure        Ask your health care provider about:  Changing or stopping your regular medicines. This is especially important if you are taking diabetes medicines or blood thinners.  Taking medicines such as aspirin and ibuprofen. These medicines can thin your blood. Do not take these medicines unless your health care provider tells you to take them.  Taking over-the-counter medicines, vitamins, herbs, and supplements.    Contact your surgeon if you:  Develop a fever of more than 100.4°F (38°C) or other feelings of illness during the 48 hours before your surgery.  Have symptoms that get worse.  Have questions or concerns about your surgery.  If you are going home the same day of your surgery you will need to arrange for a responsible adult, age 18 years old or older, to drive you home from the hospital and stay with you for 24 hours. Verification of the  will be made prior to any procedure requiring sedation. You may not go home in a taxi or any form of public transportation by yourself.     Day before your procedure  Medication(s) you need to stop the day before your surgery: Hold Losartan for 24 Hours Prior To Surgery    24 hours before your procedure DO NOT drink alcoholic beverages or smoke.  24 hours before your procedure STOP taking Erectile Dysfunction medication (i.e.,Cialis, Viagra)   You may be asked to shower with a germ-killing soap.  Day of your procedure   You may take the following medication(s) the morning of surgery with a sip of water:  Take Omeprazole Day Of Surgery With A Sip Of Water      8 hours before your scheduled arrival time, STOP all food, any dairy products, and full liquids. This includes hard candy, chewing gum or mints. This is extremely important to prevent serious complications.     Up to 2 hours before your scheduled arrival time, you may have clear liquids no cream,  powder, or pulp of any kind. Safe options are water, black coffee, plain tea, soda, Gatorade/Powerade, clear broth, apple juice.    2 hours before your scheduled arrival time, STOP drinking clear liquids.    You may need to take another shower with a germ-killing soap before you leave home in the morning. Do not use perfumes, colognes, or body lotions.  Wear comfortable loose-fitting clothing.  Remove all jewelry including body piercing and rings, dark colored nail polish, and make up prior to arrival at the hospital. Leave all valuables at home.   Bring your hearing aids if you rely on them.  Do not wear contact lenses. If you wear eyeglasses remember to bring a case to store them in while you are in surgery.  Do not use denture adhesives since you will be asked to remove them during your surgery.    You do not need to bring your home medications into the hospital.   Bring your sleep apnea device with you on the day of your surgery (if this applies to you).  If you have an Inspire implant for sleep apnea, please bring the remote with you on the day of surgery.  If you wear portable oxygen, bring it with you.   If you are staying overnight, you may bring a bag of items you may need such as slippers, robe and a change of clothes for your discharge. You may want to leave these items in the car until you are ready for them since your family will take your belongings when you leave the pre-operative area.  Arrive at the hospital as scheduled by the office. You will be asked to arrive 2 hours prior to your surgery time in order to prepare for your procedure.  When you arrive at the hospital  Go to the registration desk located at the main entrance of the hospital.  After registration is completed, you will be given a beeper and a sticker sheet. Take the stickers to Outpatient Surgery and place in the tray at the end of the desk to notify the staff that you have arrived and registered.   Return to the lobby to wait. You  are not always called back according to the time of arrival but rather the time your doctor will be ready.  When your beeper lights up and vibrates proceed through the double doors, under the stairs, and a member of the Outpatient Surgery staff will escort you to your preoperative room.   How to Use Chlorhexidine Before Surgery  Chlorhexidine gluconate (CHG) is a germ-killing (antiseptic) solution that is used to clean the skin. It can get rid of the bacteria that normally live on the skin and can keep them away for about 24 hours. To clean your skin with CHG, you may be given:  A CHG solution to use in the shower or as part of a sponge bath.  A prepackaged cloth that contains CHG.  Cleaning your skin with CHG may help lower the risk for infection:  While you are staying in the intensive care unit of the hospital.  If you have a vascular access, such as a central line, to provide short-term or long-term access to your veins.  If you have a catheter to drain urine from your bladder.  If you are on a ventilator. A ventilator is a machine that helps you breathe by moving air in and out of your lungs.  After surgery.  What are the risks?  Risks of using CHG include:  A skin reaction.  Hearing loss, if CHG gets in your ears and you have a perforated eardrum.  Eye injury, if CHG gets in your eyes and is not rinsed out.  The CHG product catching fire.  Make sure that you avoid smoking and flames after applying CHG to your skin.  Do not use CHG:  If you have a chlorhexidine allergy or have previously reacted to chlorhexidine.  On babies younger than 2 months of age.  How to use CHG solution  Use CHG only as told by your health care provider, and follow the instructions on the label.  Use the full amount of CHG as directed. Usually, this is one bottle.  During a shower    Follow these steps when using CHG solution during a shower (unless your health care provider gives you different instructions):  Start the shower.  Use  your normal soap and shampoo to wash your face and hair.  Turn off the shower or move out of the shower stream.  Pour the CHG onto a clean washcloth. Do not use any type of brush or rough-edged sponge.  Starting at your neck, lather your body down to your toes. Make sure you follow these instructions:  If you will be having surgery, pay special attention to the part of your body where you will be having surgery. Scrub this area for at least 1 minute.  Do not use CHG on your head or face. If the solution gets into your ears or eyes, rinse them well with water.  Avoid your genital area.  Avoid any areas of skin that have broken skin, cuts, or scrapes.  Scrub your back and under your arms. Make sure to wash skin folds.  Let the lather sit on your skin for 1-2 minutes or as long as told by your health care provider.  Thoroughly rinse your entire body in the shower. Make sure that all body creases and crevices are rinsed well.  Dry off with a clean towel. Do not put any substances on your body afterward--such as powder, lotion, or perfume--unless you are told to do so by your health care provider. Only use lotions that are recommended by the .  Put on clean clothes or pajamas.  If it is the night before your surgery, sleep in clean sheets.     During a sponge bath  Follow these steps when using CHG solution during a sponge bath (unless your health care provider gives you different instructions):  Use your normal soap and shampoo to wash your face and hair.  Pour the CHG onto a clean washcloth.  Starting at your neck, lather your body down to your toes. Make sure you follow these instructions:  If you will be having surgery, pay special attention to the part of your body where you will be having surgery. Scrub this area for at least 1 minute.  Do not use CHG on your head or face. If the solution gets into your ears or eyes, rinse them well with water.  Avoid your genital area.  Avoid any areas of skin that  have broken skin, cuts, or scrapes.  Scrub your back and under your arms. Make sure to wash skin folds.  Let the lather sit on your skin for 1-2 minutes or as long as told by your health care provider.  Using a different clean, wet washcloth, thoroughly rinse your entire body. Make sure that all body creases and crevices are rinsed well.  Dry off with a clean towel. Do not put any substances on your body afterward--such as powder, lotion, or perfume--unless you are told to do so by your health care provider. Only use lotions that are recommended by the .  Put on clean clothes or pajamas.  If it is the night before your surgery, sleep in clean sheets.  How to use CHG prepackaged cloths  Only use CHG cloths as told by your health care provider, and follow the instructions on the label.  Use the CHG cloth on clean, dry skin.  Do not use the CHG cloth on your head or face unless your health care provider tells you to.  When washing with the CHG cloth:  Avoid your genital area.  Avoid any areas of skin that have broken skin, cuts, or scrapes.  Before surgery    Follow these steps when using a CHG cloth to clean before surgery (unless your health care provider gives you different instructions):  Using the CHG cloth, vigorously scrub the part of your body where you will be having surgery. Scrub using a back-and-forth motion for 3 minutes. The area on your body should be completely wet with CHG when you are done scrubbing.  Do not rinse. Discard the cloth and let the area air-dry. Do not put any substances on the area afterward, such as powder, lotion, or perfume.  Put on clean clothes or pajamas.  If it is the night before your surgery, sleep in clean sheets.     For general bathing  Follow these steps when using CHG cloths for general bathing (unless your health care provider gives you different instructions).  Use a separate CHG cloth for each area of your body. Make sure you wash between any folds of skin  and between your fingers and toes. Wash your body in the following order, switching to a new cloth after each step:  The front of your neck, shoulders, and chest.  Both of your arms, under your arms, and your hands.  Your stomach and groin area, avoiding the genitals.  Your right leg and foot.  Your left leg and foot.  The back of your neck, your back, and your buttocks.  Do not rinse. Discard the cloth and let the area air-dry. Do not put any substances on your body afterward--such as powder, lotion, or perfume--unless you are told to do so by your health care provider. Only use lotions that are recommended by the .  Put on clean clothes or pajamas.  Contact a health care provider if:  Your skin gets irritated after scrubbing.  You have questions about using your solution or cloth.  You swallow any chlorhexidine. Call your local poison control center (1-905.167.9041 in the [place]  Get help right away if:  Your eyes itch badly, or they become very red or swollen.  Your skin itches badly and is red or swollen.  Your hearing changes.  You have trouble seeing.  You have swelling or tingling in your mouth or throat.  You have trouble breathing.  These symptoms may represent a serious problem that is an emergency. Do not wait to see if the symptoms will go away. Get medical help right away. Call your local emergency services (169 in the [place] Do not drive yourself to the hospital.  Summary  Chlorhexidine gluconate (CHG) is a germ-killing (antiseptic) solution that is used to clean the skin. Cleaning your skin with CHG may help to lower your risk for infection.  You may be given CHG to use for bathing. It may be in a bottle or in a prepackaged cloth to use on your skin. Carefully follow your health care provider's instructions and the instructions on the product label.  Do not use CHG if you have a chlorhexidine allergy.  Contact your health care provider if your skin gets irritated after scrubbing.  This  information is not intended to replace advice given to you by your health care provider. Make sure you discuss any questions you have with your health care provider.  Document Revised: 04/17/2023 Document Reviewed: 02/28/2022  [employer]vier Patient Education © 2023 [employer]

## 2024-11-27 ENCOUNTER — TELEPHONE (OUTPATIENT)
Dept: UROLOGY | Facility: CLINIC | Age: 74
End: 2024-11-27
Payer: MEDICARE

## 2024-11-27 NOTE — TELEPHONE ENCOUNTER
Called patient to remind them to arrive at patient registration on 12/3 at 0500 for the procedure with Dr. Matamoros. Spoke with patient. Told patient if they had any questions to please contact our office at [phone].

## 2024-12-03 ENCOUNTER — HOSPITAL ENCOUNTER (OUTPATIENT)
Facility: HOSPITAL | Age: 74
Discharge: HOME | End: 2024-12-04
Attending: UROLOGY | Admitting: UROLOGY
Payer: MEDICARE

## 2024-12-03 ENCOUNTER — ANESTHESIA (OUTPATIENT)
Dept: PERIOP | Facility: HOSPITAL | Age: 74
End: 2024-12-03
Payer: MEDICARE

## 2024-12-03 ENCOUNTER — ANESTHESIA EVENT (OUTPATIENT)
Dept: PERIOP | Facility: HOSPITAL | Age: 74
End: 2024-12-03
Payer: MEDICARE

## 2024-12-03 DIAGNOSIS — C61 PROSTATE CANCER: ICD-10-CM

## 2024-12-03 LAB
BASOPHILS # BLD AUTO: 0.04 10*3/MM3 (ref 0–0.2)
BASOPHILS NFR BLD AUTO: 0.4 % (ref 0–1.5)
DEPRECATED RDW RBC AUTO: 46.5 FL (ref 37–54)
EOSINOPHIL # BLD AUTO: 0.03 10*3/MM3 (ref 0–0.4)
EOSINOPHIL NFR BLD AUTO: 0.3 % (ref 0.3–6.2)
ERYTHROCYTE [DISTWIDTH] IN BLOOD BY AUTOMATED COUNT: 13.7 % (ref 12.3–15.4)
HCT VFR BLD AUTO: 31.2 % (ref 37.5–51)
HGB BLD-MCNC: 10.3 G/DL (ref 13–17.7)
IMM GRANULOCYTES # BLD AUTO: 0.05 10*3/MM3 (ref 0–0.05)
IMM GRANULOCYTES NFR BLD AUTO: 0.5 % (ref 0–0.5)
LYMPHOCYTES # BLD AUTO: 0.7 10*3/MM3 (ref 0.7–3.1)
LYMPHOCYTES NFR BLD AUTO: 7.3 % (ref 19.6–45.3)
MCH RBC QN AUTO: 30.1 PG (ref 26.6–33)
MCHC RBC AUTO-ENTMCNC: 33 G/DL (ref 31.5–35.7)
MCV RBC AUTO: 91.2 FL (ref 79–97)
MONOCYTES # BLD AUTO: 0.51 10*3/MM3 (ref 0.1–0.9)
MONOCYTES NFR BLD AUTO: 5.4 % (ref 5–12)
NEUTROPHILS NFR BLD AUTO: 8.2 10*3/MM3 (ref 1.7–7)
NEUTROPHILS NFR BLD AUTO: 86.1 % (ref 42.7–76)
NRBC BLD AUTO-RTO: 0 /100 WBC (ref 0–0.2)
PLATELET # BLD AUTO: 137 10*3/MM3 (ref 140–450)
PMV BLD AUTO: 9.6 FL (ref 6–12)
RBC # BLD AUTO: 3.42 10*6/MM3 (ref 4.14–5.8)
WBC NRBC COR # BLD AUTO: 9.53 10*3/MM3 (ref 3.4–10.8)

## 2024-12-03 PROCEDURE — A9270 NON-COVERED ITEM OR SERVICE: HCPCS | Performed by: UROLOGY

## 2024-12-03 PROCEDURE — 94761 N-INVAS EAR/PLS OXIMETRY MLT: CPT

## 2024-12-03 PROCEDURE — S2900 ROBOTIC SURGICAL SYSTEM: HCPCS | Performed by: UROLOGY

## 2024-12-03 PROCEDURE — 85025 COMPLETE CBC W/AUTO DIFF WBC: CPT | Performed by: UROLOGY

## 2024-12-03 PROCEDURE — 88309 TISSUE EXAM BY PATHOLOGIST: CPT | Performed by: UROLOGY

## 2024-12-03 PROCEDURE — 25010000002 CEFAZOLIN PER 500 MG: Performed by: UROLOGY

## 2024-12-03 PROCEDURE — 25010000002 FENTANYL CITRATE (PF) 100 MCG/2ML SOLUTION: Performed by: NURSE ANESTHETIST, CERTIFIED REGISTERED

## 2024-12-03 PROCEDURE — 25810000003 SODIUM CHLORIDE 0.9 % SOLUTION 250 ML FLEX CONT: Performed by: NURSE ANESTHETIST, CERTIFIED REGISTERED

## 2024-12-03 PROCEDURE — 94799 UNLISTED PULMONARY SVC/PX: CPT

## 2024-12-03 PROCEDURE — 25010000002 PROPOFOL 10 MG/ML EMULSION: Performed by: NURSE ANESTHETIST, CERTIFIED REGISTERED

## 2024-12-03 PROCEDURE — 25810000003 LACTATED RINGERS PER 1000 ML: Performed by: UROLOGY

## 2024-12-03 PROCEDURE — 25810000003 SODIUM CHLORIDE PER 500 ML: Performed by: UROLOGY

## 2024-12-03 PROCEDURE — 63710000001 OXYCODONE-ACETAMINOPHEN 10-325 MG TABLET: Performed by: ANESTHESIOLOGY

## 2024-12-03 PROCEDURE — 25010000002 ONDANSETRON PER 1 MG: Performed by: NURSE ANESTHETIST, CERTIFIED REGISTERED

## 2024-12-03 PROCEDURE — 25010000002 HYDROMORPHONE 1 MG/ML SOLUTION: Performed by: NURSE ANESTHETIST, CERTIFIED REGISTERED

## 2024-12-03 PROCEDURE — 25010000002 DEXAMETHASONE PER 1 MG: Performed by: NURSE ANESTHETIST, CERTIFIED REGISTERED

## 2024-12-03 PROCEDURE — 25010000002 METOCLOPRAMIDE PER 10 MG: Performed by: UROLOGY

## 2024-12-03 PROCEDURE — 55866 LAPS SURG PRST8ECT RPBIC RAD: CPT | Performed by: UROLOGY

## 2024-12-03 PROCEDURE — 25010000002 LIDOCAINE PF 2% 2 % SOLUTION: Performed by: NURSE ANESTHETIST, CERTIFIED REGISTERED

## 2024-12-03 PROCEDURE — 63710000001 ACETAMINOPHEN 500 MG TABLET: Performed by: UROLOGY

## 2024-12-03 PROCEDURE — 25010000002 BUPIVACAINE PF 0.75 % SOLUTION: Performed by: NURSE ANESTHETIST, CERTIFIED REGISTERED

## 2024-12-03 PROCEDURE — A9270 NON-COVERED ITEM OR SERVICE: HCPCS | Performed by: ANESTHESIOLOGY

## 2024-12-03 PROCEDURE — 25810000003 LACTATED RINGERS PER 1000 ML: Performed by: ANESTHESIOLOGY

## 2024-12-03 PROCEDURE — 63710000001 OXYBUTYNIN XL 5 MG TABLET SUSTAINED-RELEASE 24 HOUR: Performed by: UROLOGY

## 2024-12-03 PROCEDURE — 25010000002 SUGAMMADEX 200 MG/2ML SOLUTION: Performed by: NURSE ANESTHETIST, CERTIFIED REGISTERED

## 2024-12-03 PROCEDURE — 25010000002 ONDANSETRON PER 1 MG: Performed by: ANESTHESIOLOGY

## 2024-12-03 PROCEDURE — 25010000002 PHENYLEPHRINE 10 MG/ML SOLUTION 5 ML VIAL: Performed by: NURSE ANESTHETIST, CERTIFIED REGISTERED

## 2024-12-03 PROCEDURE — 25010000002 KETOROLAC TROMETHAMINE PER 15 MG: Performed by: UROLOGY

## 2024-12-03 DEVICE — DEV CONTRL TISS STRATAFIX PDS PLS CTX TRCLSN 90CM 36IN VIL: Type: IMPLANTABLE DEVICE | Site: ABDOMEN | Status: FUNCTIONAL

## 2024-12-03 DEVICE — DEV CONTRL TISS STRATAFIX SPIRAL MNCRYL PLS RB1 3/0 15CM: Type: IMPLANTABLE DEVICE | Site: ABDOMEN | Status: FUNCTIONAL

## 2024-12-03 DEVICE — LARGE LIGATION CLIPS 6 CLIPS/CART
Type: IMPLANTABLE DEVICE | Site: ABDOMEN | Status: FUNCTIONAL
Brand: VAS-Q-CLIP

## 2024-12-03 RX ORDER — SODIUM CHLORIDE, SODIUM LACTATE, POTASSIUM CHLORIDE, CALCIUM CHLORIDE 600; 310; 30; 20 MG/100ML; MG/100ML; MG/100ML; MG/100ML
100 INJECTION, SOLUTION INTRAVENOUS CONTINUOUS
Status: DISCONTINUED | OUTPATIENT
Start: 2024-12-03 | End: 2024-12-04

## 2024-12-03 RX ORDER — FENTANYL CITRATE 50 UG/ML
50 INJECTION, SOLUTION INTRAMUSCULAR; INTRAVENOUS
Status: DISCONTINUED | OUTPATIENT
Start: 2024-12-03 | End: 2024-12-03 | Stop reason: HOSPADM

## 2024-12-03 RX ORDER — FLUMAZENIL 0.1 MG/ML
0.2 INJECTION INTRAVENOUS AS NEEDED
Status: DISCONTINUED | OUTPATIENT
Start: 2024-12-03 | End: 2024-12-03 | Stop reason: HOSPADM

## 2024-12-03 RX ORDER — MIDAZOLAM HYDROCHLORIDE 2 MG/2ML
0.5 INJECTION, SOLUTION INTRAMUSCULAR; INTRAVENOUS
Status: DISCONTINUED | OUTPATIENT
Start: 2024-12-03 | End: 2024-12-03 | Stop reason: HOSPADM

## 2024-12-03 RX ORDER — ACETAMINOPHEN 500 MG
1000 TABLET ORAL ONCE
Status: COMPLETED | OUTPATIENT
Start: 2024-12-03 | End: 2024-12-03

## 2024-12-03 RX ORDER — SODIUM CHLORIDE 0.9 % (FLUSH) 0.9 %
10 SYRINGE (ML) INJECTION EVERY 12 HOURS SCHEDULED
Status: DISCONTINUED | OUTPATIENT
Start: 2024-12-03 | End: 2024-12-03 | Stop reason: HOSPADM

## 2024-12-03 RX ORDER — PANTOPRAZOLE SODIUM 40 MG/1
40 TABLET, DELAYED RELEASE ORAL
Status: DISCONTINUED | OUTPATIENT
Start: 2024-12-04 | End: 2024-12-04 | Stop reason: HOSPADM

## 2024-12-03 RX ORDER — FENTANYL CITRATE 50 UG/ML
INJECTION, SOLUTION INTRAMUSCULAR; INTRAVENOUS AS NEEDED
Status: DISCONTINUED | OUTPATIENT
Start: 2024-12-03 | End: 2024-12-03 | Stop reason: SURG

## 2024-12-03 RX ORDER — ONDANSETRON 2 MG/ML
INJECTION INTRAMUSCULAR; INTRAVENOUS AS NEEDED
Status: DISCONTINUED | OUTPATIENT
Start: 2024-12-03 | End: 2024-12-03 | Stop reason: SURG

## 2024-12-03 RX ORDER — PROPOFOL 10 MG/ML
VIAL (ML) INTRAVENOUS AS NEEDED
Status: DISCONTINUED | OUTPATIENT
Start: 2024-12-03 | End: 2024-12-03 | Stop reason: SURG

## 2024-12-03 RX ORDER — MAGNESIUM HYDROXIDE 1200 MG/15ML
LIQUID ORAL AS NEEDED
Status: DISCONTINUED | OUTPATIENT
Start: 2024-12-03 | End: 2024-12-03 | Stop reason: HOSPADM

## 2024-12-03 RX ORDER — ACETAMINOPHEN 500 MG
1000 TABLET ORAL EVERY 6 HOURS
Status: DISCONTINUED | OUTPATIENT
Start: 2024-12-03 | End: 2024-12-04 | Stop reason: HOSPADM

## 2024-12-03 RX ORDER — ROCURONIUM BROMIDE 10 MG/ML
INJECTION, SOLUTION INTRAVENOUS AS NEEDED
Status: DISCONTINUED | OUTPATIENT
Start: 2024-12-03 | End: 2024-12-03 | Stop reason: SURG

## 2024-12-03 RX ORDER — OXYBUTYNIN CHLORIDE 5 MG/1
5 TABLET, EXTENDED RELEASE ORAL DAILY
Status: DISCONTINUED | OUTPATIENT
Start: 2024-12-03 | End: 2024-12-04 | Stop reason: HOSPADM

## 2024-12-03 RX ORDER — SODIUM CHLORIDE, SODIUM LACTATE, POTASSIUM CHLORIDE, CALCIUM CHLORIDE 600; 310; 30; 20 MG/100ML; MG/100ML; MG/100ML; MG/100ML
1000 INJECTION, SOLUTION INTRAVENOUS CONTINUOUS
Status: DISPENSED | OUTPATIENT
Start: 2024-12-03 | End: 2024-12-04

## 2024-12-03 RX ORDER — LIDOCAINE HYDROCHLORIDE 20 MG/ML
INJECTION, SOLUTION EPIDURAL; INFILTRATION; INTRACAUDAL; PERINEURAL AS NEEDED
Status: DISCONTINUED | OUTPATIENT
Start: 2024-12-03 | End: 2024-12-03 | Stop reason: SURG

## 2024-12-03 RX ORDER — SODIUM CHLORIDE 0.9 % (FLUSH) 0.9 %
3 SYRINGE (ML) INJECTION EVERY 12 HOURS SCHEDULED
Status: DISCONTINUED | OUTPATIENT
Start: 2024-12-03 | End: 2024-12-03 | Stop reason: HOSPADM

## 2024-12-03 RX ORDER — TAMSULOSIN HYDROCHLORIDE 0.4 MG/1
2 CAPSULE ORAL DAILY
COMMUNITY
End: 2024-12-04 | Stop reason: HOSPADM

## 2024-12-03 RX ORDER — BUPIVACAINE HYDROCHLORIDE 7.5 MG/ML
INJECTION, SOLUTION EPIDURAL; RETROBULBAR
Status: COMPLETED | OUTPATIENT
Start: 2024-12-03 | End: 2024-12-03

## 2024-12-03 RX ORDER — SODIUM CHLORIDE 0.9 % (FLUSH) 0.9 %
10 SYRINGE (ML) INJECTION AS NEEDED
Status: DISCONTINUED | OUTPATIENT
Start: 2024-12-03 | End: 2024-12-03 | Stop reason: HOSPADM

## 2024-12-03 RX ORDER — NALBUPHINE HYDROCHLORIDE 10 MG/ML
2.5 INJECTION INTRAMUSCULAR; INTRAVENOUS; SUBCUTANEOUS EVERY 4 HOURS PRN
Status: DISCONTINUED | OUTPATIENT
Start: 2024-12-03 | End: 2024-12-03 | Stop reason: RX

## 2024-12-03 RX ORDER — OXYCODONE HYDROCHLORIDE 5 MG/1
5 TABLET ORAL EVERY 4 HOURS PRN
Status: DISCONTINUED | OUTPATIENT
Start: 2024-12-03 | End: 2024-12-04 | Stop reason: HOSPADM

## 2024-12-03 RX ORDER — SODIUM CHLORIDE, SODIUM LACTATE, POTASSIUM CHLORIDE, CALCIUM CHLORIDE 600; 310; 30; 20 MG/100ML; MG/100ML; MG/100ML; MG/100ML
125 INJECTION, SOLUTION INTRAVENOUS CONTINUOUS
Status: DISCONTINUED | OUTPATIENT
Start: 2024-12-03 | End: 2024-12-04

## 2024-12-03 RX ORDER — KETOROLAC TROMETHAMINE 15 MG/ML
15 INJECTION, SOLUTION INTRAMUSCULAR; INTRAVENOUS EVERY 6 HOURS
Status: DISCONTINUED | OUTPATIENT
Start: 2024-12-03 | End: 2024-12-04 | Stop reason: HOSPADM

## 2024-12-03 RX ORDER — DICLOFENAC SODIUM 75 MG/1
75 TABLET, DELAYED RELEASE ORAL 2 TIMES DAILY
COMMUNITY
End: 2024-12-04 | Stop reason: HOSPADM

## 2024-12-03 RX ORDER — LABETALOL HYDROCHLORIDE 5 MG/ML
5 INJECTION, SOLUTION INTRAVENOUS
Status: DISCONTINUED | OUTPATIENT
Start: 2024-12-03 | End: 2024-12-03 | Stop reason: HOSPADM

## 2024-12-03 RX ORDER — IBUPROFEN 600 MG/1
600 TABLET, FILM COATED ORAL EVERY 6 HOURS PRN
Status: DISCONTINUED | OUTPATIENT
Start: 2024-12-03 | End: 2024-12-03 | Stop reason: HOSPADM

## 2024-12-03 RX ORDER — TRANEXAMIC ACID 100 MG/ML
INJECTION, SOLUTION INTRAVENOUS AS NEEDED
Status: DISCONTINUED | OUTPATIENT
Start: 2024-12-03 | End: 2024-12-03 | Stop reason: SURG

## 2024-12-03 RX ORDER — DOCUSATE SODIUM 100 MG/1
100 CAPSULE, LIQUID FILLED ORAL 2 TIMES DAILY PRN
Status: DISCONTINUED | OUTPATIENT
Start: 2024-12-03 | End: 2024-12-04 | Stop reason: HOSPADM

## 2024-12-03 RX ORDER — LIDOCAINE HYDROCHLORIDE 10 MG/ML
0.5 INJECTION, SOLUTION EPIDURAL; INFILTRATION; INTRACAUDAL; PERINEURAL ONCE AS NEEDED
Status: DISCONTINUED | OUTPATIENT
Start: 2024-12-03 | End: 2024-12-03 | Stop reason: HOSPADM

## 2024-12-03 RX ORDER — PROMETHAZINE HYDROCHLORIDE 25 MG/1
12.5 TABLET ORAL EVERY 6 HOURS PRN
Status: DISCONTINUED | OUTPATIENT
Start: 2024-12-03 | End: 2024-12-04 | Stop reason: HOSPADM

## 2024-12-03 RX ORDER — ONDANSETRON 2 MG/ML
4 INJECTION INTRAMUSCULAR; INTRAVENOUS EVERY 6 HOURS PRN
Status: DISCONTINUED | OUTPATIENT
Start: 2024-12-03 | End: 2024-12-04 | Stop reason: HOSPADM

## 2024-12-03 RX ORDER — SODIUM CHLORIDE 0.9 % (FLUSH) 0.9 %
3-10 SYRINGE (ML) INJECTION AS NEEDED
Status: DISCONTINUED | OUTPATIENT
Start: 2024-12-03 | End: 2024-12-03 | Stop reason: HOSPADM

## 2024-12-03 RX ORDER — OXYCODONE HYDROCHLORIDE 5 MG/1
10 TABLET ORAL EVERY 4 HOURS PRN
Status: DISCONTINUED | OUTPATIENT
Start: 2024-12-03 | End: 2024-12-04 | Stop reason: HOSPADM

## 2024-12-03 RX ORDER — ONDANSETRON 4 MG/1
4 TABLET, ORALLY DISINTEGRATING ORAL EVERY 6 HOURS PRN
Status: DISCONTINUED | OUTPATIENT
Start: 2024-12-03 | End: 2024-12-04 | Stop reason: HOSPADM

## 2024-12-03 RX ORDER — DEXAMETHASONE SODIUM PHOSPHATE 4 MG/ML
INJECTION, SOLUTION INTRA-ARTICULAR; INTRALESIONAL; INTRAMUSCULAR; INTRAVENOUS; SOFT TISSUE AS NEEDED
Status: DISCONTINUED | OUTPATIENT
Start: 2024-12-03 | End: 2024-12-03 | Stop reason: SURG

## 2024-12-03 RX ORDER — HYDROMORPHONE HYDROCHLORIDE 1 MG/ML
0.5 INJECTION, SOLUTION INTRAMUSCULAR; INTRAVENOUS; SUBCUTANEOUS
Status: DISCONTINUED | OUTPATIENT
Start: 2024-12-03 | End: 2024-12-03 | Stop reason: HOSPADM

## 2024-12-03 RX ORDER — METOCLOPRAMIDE HYDROCHLORIDE 5 MG/ML
10 INJECTION INTRAMUSCULAR; INTRAVENOUS EVERY 6 HOURS PRN
Status: DISCONTINUED | OUTPATIENT
Start: 2024-12-03 | End: 2024-12-04 | Stop reason: HOSPADM

## 2024-12-03 RX ORDER — OXYCODONE AND ACETAMINOPHEN 10; 325 MG/1; MG/1
1 TABLET ORAL EVERY 4 HOURS PRN
Status: DISCONTINUED | OUTPATIENT
Start: 2024-12-03 | End: 2024-12-03 | Stop reason: HOSPADM

## 2024-12-03 RX ORDER — SODIUM CHLORIDE 9 MG/ML
INJECTION, SOLUTION INTRAVENOUS AS NEEDED
Status: DISCONTINUED | OUTPATIENT
Start: 2024-12-03 | End: 2024-12-03 | Stop reason: HOSPADM

## 2024-12-03 RX ORDER — BUPIVACAINE HCL/0.9 % NACL/PF 0.125 %
PLASTIC BAG, INJECTION (ML) EPIDURAL AS NEEDED
Status: DISCONTINUED | OUTPATIENT
Start: 2024-12-03 | End: 2024-12-03 | Stop reason: SURG

## 2024-12-03 RX ORDER — SODIUM CHLORIDE 9 MG/ML
40 INJECTION, SOLUTION INTRAVENOUS AS NEEDED
Status: DISCONTINUED | OUTPATIENT
Start: 2024-12-03 | End: 2024-12-03 | Stop reason: HOSPADM

## 2024-12-03 RX ORDER — NALOXONE HCL 0.4 MG/ML
0.04 VIAL (ML) INJECTION AS NEEDED
Status: DISCONTINUED | OUTPATIENT
Start: 2024-12-03 | End: 2024-12-03 | Stop reason: HOSPADM

## 2024-12-03 RX ORDER — ASPIRIN 81 MG/1
81 TABLET, CHEWABLE ORAL DAILY
Status: DISCONTINUED | OUTPATIENT
Start: 2024-12-04 | End: 2024-12-04 | Stop reason: HOSPADM

## 2024-12-03 RX ORDER — SODIUM CHLORIDE 0.9 % (FLUSH) 0.9 %
3 SYRINGE (ML) INJECTION AS NEEDED
Status: DISCONTINUED | OUTPATIENT
Start: 2024-12-03 | End: 2024-12-03 | Stop reason: HOSPADM

## 2024-12-03 RX ORDER — ONDANSETRON 2 MG/ML
4 INJECTION INTRAMUSCULAR; INTRAVENOUS ONCE
Status: COMPLETED | OUTPATIENT
Start: 2024-12-03 | End: 2024-12-03

## 2024-12-03 RX ADMIN — SUGAMMADEX 200 MG: 100 INJECTION, SOLUTION INTRAVENOUS at 10:38

## 2024-12-03 RX ADMIN — CEFAZOLIN 2000 MG: 2 INJECTION, POWDER, FOR SOLUTION INTRAMUSCULAR; INTRAVENOUS at 20:55

## 2024-12-03 RX ADMIN — DEXAMETHASONE SODIUM PHOSPHATE 8 MG: 4 INJECTION, SOLUTION INTRA-ARTICULAR; INTRALESIONAL; INTRAMUSCULAR; INTRAVENOUS; SOFT TISSUE at 10:29

## 2024-12-03 RX ADMIN — Medication 100 MCG: at 07:44

## 2024-12-03 RX ADMIN — SODIUM CHLORIDE, SODIUM LACTATE, POTASSIUM CHLORIDE, CALCIUM CHLORIDE 100 ML/HR: 20; 30; 600; 310 INJECTION, SOLUTION INTRAVENOUS at 11:49

## 2024-12-03 RX ADMIN — PHENYLEPHRINE HYDROCHLORIDE 0.5 MCG/KG/MIN: 10 INJECTION INTRAVENOUS at 08:32

## 2024-12-03 RX ADMIN — BUPIVACAINE HYDROCHLORIDE 1 ML: 7.5 INJECTION, SOLUTION EPIDURAL; RETROBULBAR at 07:18

## 2024-12-03 RX ADMIN — OXYBUTYNIN CHLORIDE 5 MG: 5 TABLET, EXTENDED RELEASE ORAL at 13:22

## 2024-12-03 RX ADMIN — ACETAMINOPHEN 1000 MG: 500 TABLET, FILM COATED ORAL at 18:44

## 2024-12-03 RX ADMIN — SODIUM CHLORIDE, SODIUM LACTATE, POTASSIUM CHLORIDE, CALCIUM CHLORIDE 125 ML/HR: 20; 30; 600; 310 INJECTION, SOLUTION INTRAVENOUS at 13:22

## 2024-12-03 RX ADMIN — CEFAZOLIN 2000 MG: 2 INJECTION, POWDER, FOR SOLUTION INTRAMUSCULAR; INTRAVENOUS at 07:29

## 2024-12-03 RX ADMIN — Medication 200 MCG: at 08:25

## 2024-12-03 RX ADMIN — OXYCODONE AND ACETAMINOPHEN 1 TABLET: 325; 10 TABLET ORAL at 12:01

## 2024-12-03 RX ADMIN — KETOROLAC TROMETHAMINE 15 MG: 15 INJECTION, SOLUTION INTRAMUSCULAR; INTRAVENOUS at 18:44

## 2024-12-03 RX ADMIN — ONDANSETRON 4 MG: 2 INJECTION INTRAMUSCULAR; INTRAVENOUS at 11:59

## 2024-12-03 RX ADMIN — ACETAMINOPHEN 1000 MG: 500 TABLET, FILM COATED ORAL at 06:46

## 2024-12-03 RX ADMIN — Medication 100 MCG: at 08:32

## 2024-12-03 RX ADMIN — METOCLOPRAMIDE 10 MG: 5 INJECTION, SOLUTION INTRAMUSCULAR; INTRAVENOUS at 13:41

## 2024-12-03 RX ADMIN — SODIUM CHLORIDE, SODIUM LACTATE, POTASSIUM CHLORIDE, CALCIUM CHLORIDE 125 ML/HR: 20; 30; 600; 310 INJECTION, SOLUTION INTRAVENOUS at 20:55

## 2024-12-03 RX ADMIN — CEFAZOLIN 2000 MG: 2 INJECTION, POWDER, FOR SOLUTION INTRAMUSCULAR; INTRAVENOUS at 13:23

## 2024-12-03 RX ADMIN — HYDROMORPHONE HYDROCHLORIDE 0.1 MG: 1 INJECTION, SOLUTION INTRAMUSCULAR; INTRAVENOUS; SUBCUTANEOUS at 07:18

## 2024-12-03 RX ADMIN — SODIUM CHLORIDE, POTASSIUM CHLORIDE, SODIUM LACTATE AND CALCIUM CHLORIDE 1000 ML: 600; 310; 30; 20 INJECTION, SOLUTION INTRAVENOUS at 06:15

## 2024-12-03 RX ADMIN — Medication 200 MCG: at 08:08

## 2024-12-03 RX ADMIN — FENTANYL CITRATE 100 MCG: 50 INJECTION, SOLUTION INTRAMUSCULAR; INTRAVENOUS at 07:40

## 2024-12-03 RX ADMIN — Medication 100 MCG: at 07:57

## 2024-12-03 RX ADMIN — Medication 100 MCG: at 07:58

## 2024-12-03 RX ADMIN — ONDANSETRON 4 MG: 2 INJECTION INTRAMUSCULAR; INTRAVENOUS at 10:29

## 2024-12-03 RX ADMIN — TRANEXAMIC ACID 1000 MG: 100 INJECTION, SOLUTION INTRAVENOUS at 08:30

## 2024-12-03 RX ADMIN — ROCURONIUM BROMIDE 50 MG: 50 INJECTION INTRAVENOUS at 08:07

## 2024-12-03 RX ADMIN — Medication 200 MCG: at 08:15

## 2024-12-03 RX ADMIN — LIDOCAINE HYDROCHLORIDE 80 MG: 20 INJECTION, SOLUTION EPIDURAL; INFILTRATION; INTRACAUDAL; PERINEURAL at 07:20

## 2024-12-03 RX ADMIN — KETOROLAC TROMETHAMINE 15 MG: 15 INJECTION, SOLUTION INTRAMUSCULAR; INTRAVENOUS at 13:24

## 2024-12-03 RX ADMIN — PROPOFOL 120 MG: 10 INJECTION, EMULSION INTRAVENOUS at 07:20

## 2024-12-03 RX ADMIN — ROCURONIUM BROMIDE 50 MG: 50 INJECTION INTRAVENOUS at 07:20

## 2024-12-03 RX ADMIN — ACETAMINOPHEN 1000 MG: 500 TABLET, FILM COATED ORAL at 13:22

## 2024-12-03 NOTE — OP NOTE
PROSTATECTOMY LAPAROSCOPIC WITH DAVINCI ROBOT  Procedure Note    Laureano GALDAMEZ Nicky  12/3/2024    Pre-op Diagnosis:   Prostate cancer [C61]    Post-op Diagnosis:     Post-Op Diagnosis Codes:     * Prostate cancer [C61]    Procedure/CPT® Codes:      Procedure(s):  RADICAL PROSTATECTOMY LAPAROSCOPIC WITH DAVINCI ROBOT    Surgeon(s):  Laureano Matamoros MD    Anesthesia: General    Staff:   Circulator: Samanta Dhaliwal RN  Scrub Person: Farida Price; Ashleigh Panchal; Jeromy Rincon; Carola Suarez    Estimated Blood Loss: 300 mL    Specimens:                Specimens       ID Source Type Tests Collected By Collected At Frozen?    A Prostate Tissue TISSUE PATHOLOGY EXAM   Laureano Matamoros MD 12/3/24 0812 No    Description: PROSTATE    This specimen was not marked as sent.              Drains:   Closed/Suction Drain 1 Lateral RUQ Bulb 15 Fr. (Active)       Urethral Catheter Double-lumen 16 Fr. (Active)       Findings: Anterior nichols technique with bilateral partial nerve-sparing  No ureteral orifice injury  No rectal injury  Watertight anastomosis    Complications: None      Laureano Matamoros MD     Date: 12/3/2024  Time: 11:01 CST     Indications: The patient was admitted to the hospital with adenocarcinoma of the prostate. The patient now presents for Robotic Prostatectomy after discussing therapeutic alternatives.   Informed consent was obtained prior to the procedure.        Surgeon: Laureano Matamoros MD       Procedure Details   Patient was brought to the operating room.  Dilaudid spinal was placed per anesthesia.  General endotracheal anesthesia was administered in the supine position.  The patient was converted to the dorsal lithotomy position and prepped and draped in the usual sterile fashion.  Timeout was done to ensure the correct patient, procedure, and site. He received preoperative antibiotics in the holding room.  A 20 Fr Frazier catheter was placed under sterile conditions and 10 mL sterile  water was used to inflate the balloon.    The peritoneal space was accessed using the Veress needle with confirmation with two clicks and on low flow.  I marked my trocar sites with a 8 mm supraumbilical, two 8 mm trocars along a line from the umbilicus on the right, an 8 mm and 12 mm along a line from the umbilicus on the left, and 5 mm in the left upper quadrant.  I insufflated the abdomen to 15 mmHg with CO2.  I placed my 8 mm supraumbilical trocar and then placed the remaining under direct vision. I used a 0 Vicryl on the Spotsi device to close my 12 mm trocar in the left lower quadrant to be tied at the conclusion of the case. Patient was placed in steep Trendelenburg position and the robot was docked. I began the procedure by taking down sigmoid adhesions for 5 minutes.  I then incised the peritoneal reflection 1 cm above the rectum to access the seminal vesicles and vas deferens.  I identified the left vas deferens coursing laterally where I incised it to gain access to the tip of the seminal vesicles.  I dissected the left seminal vesicle using bipolar cautery to control the vasculature at the tips to ensure no damage to the neurovascular bundle.  I performed the same maneuver on the patient's right vas and seminal vesicles.  I then placed these structures on traction and was able to get a plane between the prostate and the rectum dissecting towards the apex of the prostate. I dissected between the posterior leaflet of Denovillier's fascia and the rectum.  I did leave some tissue posteriorly on the prostate.  I inspected the rectum and there was no evidence of rectal injury.    At this point, I turned my attention to developing the space of Retzius.  I identified the medial umbilical ligaments.  I incised lateral to the left and right  medial umbilical ligament down to the vas deferens.  I then connected my incision towards the right taking down the urachus and removing the bladder off of the pubic  bone.  His bladder was somewhat stuck to his pubic bone here and was difficult to dissect.  Once we get the prostate exposed it appeared the majority of his prostate was underneath his pubic bone.  I defatted the anterior prostate and took down the superficial dorsal venous complex with bipolar cautery.  I spared the puboprostatic ligaments and the endopelvic fascia.    I placed the 30 degree down scope and had my assistant move the Frazier catheter back and forth to identify the bladder neck.  I also performed the pinch maneuver to identify the bladder neck.  I incised the anterior bladder neck exposing the Frazier catheter in the midline.  I used my third arm to retract the Frazier catheter.  I identified the posterior bladder neck.  It appeared that I was well away from the ureteral orifices.  I incised the posterior bladder neck and was able to expose the previously dissected vas deferens and seminal vesicles.  I used the third arm to place these on traction.  I then incised the lateral prostatic fascia at the base of the prostate and brought this back to the pedicles of the prostate.      I then took down the left pedicle of the prostate with Heme-o-Lock Clips to help with nerve-sparing technique.  I then got a nice nerve-sparing plane, interfascial, staying above the prostatic capsule and took this all the way to the apex of the prostate.  I performed the same maneuver on the right pedicle.  I did not perform a full nerve sparing but a partial nerve-sparing was performed.    I placed the prostate on traction and incised the dorsal venous complex with monopolar cautery.  I then exposed the lateral shoulders of the urethra.  I incised the anterior urethra exposing the Frazier catheter.  I then incised the posterior urethra leaving a nice urethral stump.  I incised the rectourethralis muscle and the posterior leaflet of Denovilliers' fascia completely freeing the prostate.  The prostate was placed in an Endo Catch bag.       I then placed a DVC suspension suture with 2-0 Vicryl anchoring this to the periosteum of the pubic bone on the left with a Hem-o-xavier and Lapra-Ty and then doing selective suture around the DVC.  Again, I anchored on the right side with a Hem-o-xavier and Lapra-Ty.    I inspected the pelvis for any bleeding.  There was no arterial bleeding noted.  There is no venous bleeding noted.  We dropped pneumoperitoneum and again there was no bleeding noted.      I used a 3-0 Stratafix single arm suture to perform the Adriano stitch re-approximating Denovilliers fascia posteriorly.      I then used a 3-0 Stratafix double armed suture to perform the anastomosis.  I ran one arm clockwise and one arm counterclockwise getting great mucosal to mucosal apposition of the urethra to the bladder.  I then tied the two ends of the suture to themselves.  The final 20 Fr Frazier catheter was placed with 10 mL sterile water used to inflate the balloon.  I tested the anastomosis with 120 mL of saline and it was water tight.  I placed a 15 round BENNETT drain through the third arm that was secured with a 2-0 silk suture.     The robot was undocked.  The patient was placed in dorsal lithotomy position.  I incised my 8 mm supraumbilical trocar and removed the prostate.  I then closed the fascia with 0 Vicryl on a UR 6 needle in a figure-of-eight fashion.  Wounds were irrigated.  I tied my previously placed O Vicryl.  I then closed the skin incisions with 4-0 Vicryl in a subcuticular fashion.  Sterile dressings were applied. The Frazier was secured to the leg.  Patient was awakened from anesthesia in stable condition.  All sponge, needle, and instrument counts were correct.

## 2024-12-03 NOTE — PLAN OF CARE
Goal Outcome Evaluation:      Pt is A&Ox4 and afebrile. He has fluids going per order. He has a BENNETT drain and a menjivar. He has 5 lap sites and came up from surgery today. Family is at bedside.

## 2024-12-03 NOTE — ANESTHESIA PREPROCEDURE EVALUATION
Anesthesia Evaluation     history of anesthetic complications:  PONV  NPO Solid Status: > 8 hours  NPO Liquid Status: > 2 hours           Airway   Mallampati: II  TM distance: >3 FB  No difficulty expected  Dental      Pulmonary    (-) sleep apnea, not a smoker  Cardiovascular   Exercise tolerance: good (4-7 METS)    (+) hypertension, hyperlipidemia  (-) CAD, cardiac stents      Neuro/Psych  (+) CVA, dizziness/light headedness, numbness  GI/Hepatic/Renal/Endo    (+) GERD  (-) liver disease, no renal disease, diabetes    Musculoskeletal     Abdominal    Substance History      OB/GYN          Other   arthritis,   history of cancer                    Anesthesia Plan    ASA 3     spinal, general and ITN     (ITN)  intravenous induction     Anesthetic plan, risks, benefits, and alternatives have been provided, discussed and informed consent has been obtained with: patient.      CODE STATUS:

## 2024-12-03 NOTE — ANESTHESIA POSTPROCEDURE EVALUATION
Patient: Laureano Saunders    Procedure Summary       Date: 12/03/24 Room / Location: [facility] OR  /  [facility] OR    Anesthesia Start: 0704 Anesthesia Stop: 1105    Procedure: RADICAL PROSTATECTOMY LAPAROSCOPIC WITH DAVINCI ROBOT (Abdomen) Diagnosis:       Prostate cancer      (Prostate cancer [C61])    Surgeons: Laureano Matamoros MD Provider: Lorrie Esqueda CRNA    Anesthesia Type: spinal, general, ITN ASA Status: 3            Anesthesia Type: spinal, general, ITN    Vitals  Vitals Value Taken Time   /69 12/03/24 1239   Temp 97.2 °F (36.2 °C) 12/03/24 1239   Pulse 63 12/03/24 1239   Resp 14 12/03/24 1239   SpO2 96 % 12/03/24 1239           Post Anesthesia Care and Evaluation    Patient location during evaluation: PACU  Patient participation: complete - patient participated  Level of consciousness: awake and awake and alert  Pain score: 0  Pain management: adequate    Airway patency: patent  Anesthetic complications: No anesthetic complications  PONV Status: none  Cardiovascular status: acceptable  Respiratory status: acceptable  Hydration status: acceptable    Comments: Patient discharged according to acceptable Joaquín score per RN assessment. See nursing records for further information.     Blood pressure 131/69, pulse 63, temperature 97.2 °F (36.2 °C), resp. rate 14, weight 74.9 kg (165 lb 2 oz), SpO2 96%.

## 2024-12-03 NOTE — SIGNIFICANT NOTE
Pt feels tactile stimulation bilateral upper thighs and lower calf sections bilateral has ability to wiggle bilateral feet and toes.

## 2024-12-03 NOTE — H&P
[facility]   PREOPERATIVE HISTORY AND PHYSICAL    Patient Name:Laureano Saunders  : 1950  MRN: [id number]  Primary Care Physician: Oneil Awad MD  Date of admission: 12/3/2024    Subjective   Subjective     Chief Complaint: preoperative evaluation    History of Present Illness  Laureano Saunders is a 74 y.o. male who presents for preoperative evaluation. He is scheduled for RADICAL PROSTATECTOMY LAPAROSCOPIC WITH DAVINCI ROBOT (N/A)    Review of Systems   Constitutional:  Negative for chills and fever.   Gastrointestinal:  Negative for abdominal pain, anal bleeding and blood in stool.   Genitourinary:  Negative for dysuria and hematuria.        Personal History     Past Medical History:   Diagnosis Date    Allergic rhinitis     Ankylosis     Cancer     prostate    CVA (cerebral vascular accident)     DDD (degenerative disc disease), lumbar     GERD (gastroesophageal reflux disease)     Hypercholesteremia     Hypertension     Radiculopathy, lumbar region     Scalp psoriasis     Vertigo        Past Surgical History:   Procedure Laterality Date    COLONOSCOPY  2011    normal    COLONOSCOPY N/A 2019    Procedure: COLONOSCOPY WITH ANESTHESIA;  Surgeon: Reuben Gruber DO;  Location: [facility] ENDOSCOPY;  Service: Gastroenterology    COLONOSCOPY N/A 2024    Procedure: COLONOSCOPY WITH ANESTHESIA;  Surgeon: Reuben Gruber DO;  Location: [facility] ENDOSCOPY;  Service: Gastroenterology;  Laterality: N/A;  pre: family hx colon cancer  post: diverticulosis  [place]    ENDOSCOPY  2015    stable dos santos esophagus    ENDOSCOPY N/A 2019    Procedure: ESOPHAGOGASTRODUODENOSCOPY WITH ANESTHESIA;  Surgeon: Reuben Gruber DO;  Location: [facility] ENDOSCOPY;  Service: Gastroenterology    ENDOSCOPY N/A 2022    Procedure: ESOPHAGOGASTRODUODENOSCOPY WITH ANESTHESIA;  Surgeon: Reuben Gruber DO;  Location: [facility] ENDOSCOPY;  Service: Gastroenterology;  Laterality: N/A;  preop; GERD  postop;  r/o aniya   PCP [facility]     EYE SURGERY      KNEE ARTHROSCOPY      right knee x 2    NECK SURGERY      ROTATOR CUFF REPAIR         Family History: His family history includes Cancer in his father; Colon cancer in his father; Diabetes in his paternal grandmother and sister; Heart disease in his father and mother; Hypertension in his brother, father, mother, and sister.     Social History: He  reports that he has quit smoking. His smoking use included cigarettes. He has been exposed to tobacco smoke. His smokeless tobacco use includes chew. He reports that he does not drink alcohol and does not use drugs.    Home Medications:  NIFEdipine XL, aspirin, diclofenac, losartan, losartan-hydrochlorothiazide, omeprazole, ondansetron, simvastatin, and tamsulosin    Allergies:  He is allergic to penicillins, terramycin [oxytetracycline], codeine, influenza vaccines, lipitor [atorvastatin], and mycinaire nasal mist [sodium chloride].    Objective    Objective     Vitals:    Temp:  [98.4 °F (36.9 °C)] 98.4 °F (36.9 °C)  Heart Rate:  [71] 71  Resp:  [16] 16  BP: (158)/(92) 158/92    Physical Exam  Vitals reviewed.   Constitutional:       General: He is not in acute distress.     Appearance: He is well-developed. He is not diaphoretic.   Pulmonary:      Effort: Pulmonary effort is normal.   Abdominal:      General: There is no distension.      Palpations: Abdomen is soft. There is no mass.      Tenderness: There is no abdominal tenderness. There is no guarding or rebound.      Hernia: No hernia is present.   Skin:     General: Skin is warm and dry.   Neurological:      Mental Status: He is alert and oriented to person, place, and time.         Assessment & Plan   Assessment / Plan     Brief Patient Summary:  Laureano Saunders is a 74 y.o. male who presents for preoperative evaluation.    Pre-Op Diagnosis Codes:      * Prostate cancer [C61]    Active Hospital Problems:  Active Hospital Problems    Diagnosis     **Prostate cancer       Plan:   Procedure(s):  RADICAL PROSTATECTOMY LAPAROSCOPIC WITH DAVINCI ROBOT    The risks, benefits, and alternatives of the procedure including but not limited to NESTOR, ED, rectal injury requiring colostomy vs. Closure, small bowel injury, anastomotic leak, ureteral injury and risks of the anesthesia were discussed in detail with the patient and questions were answered. No guarantees were made or implied. Informed consent was obtained.    Laureano Matamoros MD

## 2024-12-03 NOTE — ANESTHESIA PROCEDURE NOTES
Spinal Block      Patient location during procedure: OR  Indication:at surgeon's request and post-op pain management  Performed By  CRNA/CAA: Lorrie Esqueda CRNA  Preanesthetic Checklist  Completed: patient identified, IV checked, site marked, risks and benefits discussed, surgical consent, monitors and equipment checked, pre-op evaluation and timeout performed  Spinal Block Prep:  Patient Position:sitting  Sterile Tech:cap, gloves, sterile barriers and mask  Prep:Chloraprep  Patient Monitoring:EKG, continuous pulse oximetry and blood pressure monitoring    Spinal Block Procedure  Approach:midline  Guidance:palpation technique  Location:L3-L4  Needle Type:Sprotte  Needle Gauge:24 G  Placement of Spinal needle event:cerebrospinal fluid aspirated  Paresthesia: no  Fluid Appearance:clear  Medications: bupivacaine PF (MARCAINE) 0.75% - Intrathecal   1 mL - 12/3/2024 7:18:00 AM   Post Assessment  Patient Tolerance:patient tolerated the procedure well with no apparent complications  Complications no

## 2024-12-03 NOTE — ANESTHESIA PROCEDURE NOTES
Airway  Urgency: elective    Date/Time: 12/3/2024 7:22 AM  Airway not difficult    General Information and Staff    Patient location during procedure: OR  CRNA/CAA: Lorrie Esqueda CRNA    Indications and Patient Condition  Indications for airway management: airway protection    Preoxygenated: yes  Mask difficulty assessment: 1 - vent by mask    Final Airway Details  Final airway type: endotracheal airway      Successful airway: ETT  Cuffed: yes   Successful intubation technique: direct laryngoscopy  Facilitating devices/methods: intubating stylet  Endotracheal tube insertion site: oral  Blade: Miladys  Blade size: 3.5.  ETT size (mm): 7.5  Cormack-Lehane Classification: grade IIb - view of arytenoids or posterior of glottis only  Placement verified by: chest auscultation and capnometry   Cuff volume (mL): 8  Measured from: teeth  ETT/EBT  to teeth (cm): 22  Number of attempts at approach: 1  Assessment: lips, teeth, and gum same as pre-op and atraumatic intubation

## 2024-12-04 VITALS
BODY MASS INDEX: 28.19 KG/M2 | HEART RATE: 74 BPM | RESPIRATION RATE: 16 BRPM | HEIGHT: 64 IN | OXYGEN SATURATION: 94 % | SYSTOLIC BLOOD PRESSURE: 141 MMHG | DIASTOLIC BLOOD PRESSURE: 51 MMHG | TEMPERATURE: 98 F | WEIGHT: 165.12 LBS

## 2024-12-04 LAB
ANION GAP SERPL CALCULATED.3IONS-SCNC: 12 MMOL/L (ref 5–15)
BUN SERPL-MCNC: 31 MG/DL (ref 8–23)
BUN/CREAT SERPL: 23.8 (ref 7–25)
CALCIUM SPEC-SCNC: 7.5 MG/DL (ref 8.6–10.5)
CHLORIDE SERPL-SCNC: 103 MMOL/L (ref 98–107)
CO2 SERPL-SCNC: 22 MMOL/L (ref 22–29)
CREAT FLD-MCNC: 1.53 MG/DL
CREAT SERPL-MCNC: 1.3 MG/DL (ref 0.76–1.27)
DEPRECATED RDW RBC AUTO: 46.3 FL (ref 37–54)
EGFRCR SERPLBLD CKD-EPI 2021: 58.4 ML/MIN/1.73
ERYTHROCYTE [DISTWIDTH] IN BLOOD BY AUTOMATED COUNT: 13.8 % (ref 12.3–15.4)
GLUCOSE SERPL-MCNC: 123 MG/DL (ref 65–99)
HCT VFR BLD AUTO: 36.7 % (ref 37.5–51)
HGB BLD-MCNC: 11.9 G/DL (ref 13–17.7)
MCH RBC QN AUTO: 29.4 PG (ref 26.6–33)
MCHC RBC AUTO-ENTMCNC: 32.4 G/DL (ref 31.5–35.7)
MCV RBC AUTO: 90.6 FL (ref 79–97)
PLATELET # BLD AUTO: 180 10*3/MM3 (ref 140–450)
PMV BLD AUTO: 9.7 FL (ref 6–12)
POTASSIUM SERPL-SCNC: 3.7 MMOL/L (ref 3.5–5.2)
RBC # BLD AUTO: 4.05 10*6/MM3 (ref 4.14–5.8)
SODIUM SERPL-SCNC: 137 MMOL/L (ref 136–145)
WBC NRBC COR # BLD AUTO: 14.44 10*3/MM3 (ref 3.4–10.8)

## 2024-12-04 PROCEDURE — 80048 BASIC METABOLIC PNL TOTAL CA: CPT | Performed by: UROLOGY

## 2024-12-04 PROCEDURE — 63710000001 OXYBUTYNIN XL 5 MG TABLET SUSTAINED-RELEASE 24 HOUR: Performed by: UROLOGY

## 2024-12-04 PROCEDURE — 63710000001 ASPIRIN 81 MG CHEWABLE TABLET: Performed by: UROLOGY

## 2024-12-04 PROCEDURE — A9270 NON-COVERED ITEM OR SERVICE: HCPCS | Performed by: UROLOGY

## 2024-12-04 PROCEDURE — 85027 COMPLETE CBC AUTOMATED: CPT | Performed by: UROLOGY

## 2024-12-04 PROCEDURE — 63710000001 PANTOPRAZOLE 40 MG TABLET DELAYED-RELEASE: Performed by: UROLOGY

## 2024-12-04 PROCEDURE — 63710000001 ACETAMINOPHEN 500 MG TABLET: Performed by: UROLOGY

## 2024-12-04 PROCEDURE — 25810000003 LACTATED RINGERS PER 1000 ML: Performed by: UROLOGY

## 2024-12-04 PROCEDURE — 25010000002 KETOROLAC TROMETHAMINE PER 15 MG: Performed by: UROLOGY

## 2024-12-04 PROCEDURE — 82570 ASSAY OF URINE CREATININE: CPT | Performed by: UROLOGY

## 2024-12-04 RX ORDER — CEPHALEXIN 500 MG/1
500 CAPSULE ORAL 3 TIMES DAILY
Qty: 9 CAPSULE | Refills: 0 | Status: SHIPPED | OUTPATIENT
Start: 2024-12-04 | End: 2024-12-07

## 2024-12-04 RX ORDER — DOCUSATE SODIUM 100 MG/1
100 CAPSULE, LIQUID FILLED ORAL 2 TIMES DAILY
Qty: 60 CAPSULE | Refills: 0 | Status: SHIPPED | OUTPATIENT
Start: 2024-12-04 | End: 2024-12-05 | Stop reason: SDUPTHER

## 2024-12-04 RX ORDER — KETOROLAC TROMETHAMINE 10 MG/1
10 TABLET, FILM COATED ORAL EVERY 6 HOURS PRN
Qty: 12 TABLET | Refills: 0 | Status: SHIPPED | OUTPATIENT
Start: 2024-12-04

## 2024-12-04 RX ORDER — OXYCODONE HYDROCHLORIDE 5 MG/1
5 TABLET ORAL EVERY 8 HOURS PRN
Qty: 10 TABLET | Refills: 0 | Status: SHIPPED | OUTPATIENT
Start: 2024-12-04

## 2024-12-04 RX ADMIN — ACETAMINOPHEN 1000 MG: 500 TABLET, FILM COATED ORAL at 06:36

## 2024-12-04 RX ADMIN — KETOROLAC TROMETHAMINE 15 MG: 15 INJECTION, SOLUTION INTRAMUSCULAR; INTRAVENOUS at 06:36

## 2024-12-04 RX ADMIN — SODIUM CHLORIDE, SODIUM LACTATE, POTASSIUM CHLORIDE, CALCIUM CHLORIDE 125 ML/HR: 20; 30; 600; 310 INJECTION, SOLUTION INTRAVENOUS at 05:34

## 2024-12-04 RX ADMIN — KETOROLAC TROMETHAMINE 15 MG: 15 INJECTION, SOLUTION INTRAMUSCULAR; INTRAVENOUS at 01:09

## 2024-12-04 RX ADMIN — ASPIRIN 81 MG: 81 TABLET, CHEWABLE ORAL at 09:11

## 2024-12-04 RX ADMIN — ACETAMINOPHEN 1000 MG: 500 TABLET, FILM COATED ORAL at 01:09

## 2024-12-04 RX ADMIN — PANTOPRAZOLE SODIUM 40 MG: 40 TABLET, DELAYED RELEASE ORAL at 06:36

## 2024-12-04 RX ADMIN — OXYBUTYNIN CHLORIDE 5 MG: 5 TABLET, EXTENDED RELEASE ORAL at 09:11

## 2024-12-04 NOTE — PLAN OF CARE
Goal Outcome Evaluation:                 BENNETT drain in place. Creatine sent to lab. Frazier in place with clear dark yellow urine. X5 lap incisions intact no drainage noted. Patient denies pain or distress. Ambulated in [place] with staff. Safety maintained. No falls or injuries this shift. Call light within reach. Spouse at bedside. Patient resting in recliner at this time. Denies needs.

## 2024-12-04 NOTE — DISCHARGE SUMMARY
Date of Discharge:  12/4/2024    Discharge Diagnosis: Prostate cancer    Presenting Problem/History of Present Illness  Prostate cancer [C61]       Hospital course: Patient was admitted on 12/3/2024 underwent robotic assisted lap prostatectomy.  He did well postoperatively transferred to the floor.  He had expected acute blood loss anemia.  He had a mild leukocytosis consistent with recent surgery.  He was afebrile and his vital signs were stable.  Abdominal exam was unremarkable.  Incisions were clean dry and intact.  He had appropriate BENNETT output which was serosanguineous.  BENNETT creatinine was consistent with serum creatinine and BENNETT drain was removed.  He received appropriate Frazier care catheter teaching was discharged to home in good condition.    Procedures Performed  Procedure(s):  RADICAL PROSTATECTOMY LAPAROSCOPIC WITH DAVINCI ROBOT       Consults:   Consults       Date and Time Order Name Status Description    12/3/2024 12:42 PM Inpatient [facility] Consult                Condition on Discharge: Good    Vital Signs  Temp:  [97.2 °F (36.2 °C)-98.4 °F (36.9 °C)] 98 °F (36.7 °C)  Heart Rate:  [59-96] 74  Resp:  [12-16] 16  BP: ()/(51-92) 141/51    Discharge Disposition  Home or Self Care    Discharge Medications     Discharge Medications        New Medications        Instructions Start Date   cephalexin 500 MG capsule  Commonly known as: KEFLEX   500 mg, Oral, 3 Times Daily, Start day prior to cath removal      docusate sodium 100 MG capsule  Commonly known as: COLACE   100 mg, Oral, 2 Times Daily      hyoscyamine 0.125 MG SL tablet  Commonly known as: Levsin/SL   0.125 mg, Oral, Every 4 Hours PRN      ketorolac 10 MG tablet  Commonly known as: TORADOL   10 mg, Oral, Every 6 Hours PRN      oxyCODONE 5 MG immediate release tablet  Commonly known as: Roxicodone   5 mg, Oral, Every 8 Hours PRN             Continue These Medications        Instructions Start Date   aspirin 81 MG chewable tablet   81 mg,  Oral, Daily      losartan 50 MG tablet  Commonly known as: COZAAR   50 mg, Oral, Daily      NIFEdipine XL 90 MG 24 hr tablet  Commonly known as: PROCARDIA XL   90 mg, Oral, Daily      omeprazole 40 MG capsule  Commonly known as: priLOSEC   40 mg, Oral, Daily      simvastatin 40 MG tablet  Commonly known as: ZOCOR   40 mg, Oral, Nightly             Stop These Medications      diclofenac 75 MG EC tablet  Commonly known as: VOLTAREN     tamsulosin 0.4 MG capsule 24 hr capsule  Commonly known as: FLOMAX              Discharge Diet:     Activity at Discharge:     Follow-up Appointments  No future appointments.    We Abi Padilla for cath removal  6 weeks Dr. Matamoros PSA prior test Results Pending at Discharge  Pending Labs       Order Current Status    Tissue Pathology Exam In process             Laureano Matamoros MD  12/04/24  08:01 CST

## 2024-12-04 NOTE — DISCHARGE INSTRUCTIONS
Please call MD for Temperature more than 101.5, nausea vomiting, yellow drainage from the wound, catheter not draining, blood clots in Frazier.  No driving while the catheter is in.  No lifting greater than 20 pounds for 4 weeks.  Please take your medications as prescribed.  Dr. Matamoros will contact you regarding final pathology results.

## 2024-12-04 NOTE — CONSULTS
Medical Consult    Patient:  Laureano Saunders  MRN: [id number]    CHIEF COMPLAINT:  Prostate Cancer    History Obtained From: the patient   PCP: Oneil Awad MD    HISTORY OF PRESENT ILLNESS:   The patient is a 74 y.o. male who presented for radical proctectomy due prostate cancer. He is doing well post op day #1. He is hungry and requesting regular food this morning and is  anxious to go home.  Pain is well controlled.     REVIEW OF SYSTEMS:    Review of Systems       Past Medical History:  Past Medical History:   Diagnosis Date    Allergic rhinitis     Ankylosis     Cancer     prostate    CVA (cerebral vascular accident)     DDD (degenerative disc disease), lumbar     GERD (gastroesophageal reflux disease)     Hypercholesteremia     Hypertension     Radiculopathy, lumbar region     Scalp psoriasis     Vertigo        Past Surgical History:  Past Surgical History:   Procedure Laterality Date    COLONOSCOPY  09/01/2011    normal    COLONOSCOPY N/A 8/27/2019    Procedure: COLONOSCOPY WITH ANESTHESIA;  Surgeon: Reuben Gruber DO;  Location: [facility] ENDOSCOPY;  Service: Gastroenterology    COLONOSCOPY N/A 9/4/2024    Procedure: COLONOSCOPY WITH ANESTHESIA;  Surgeon: Reuben Gruber DO;  Location: [facility] ENDOSCOPY;  Service: Gastroenterology;  Laterality: N/A;  pre: family hx colon cancer  post: diverticulosis  [place]    ENDOSCOPY  06/19/2015    stable dos santos esophagus    ENDOSCOPY N/A 8/29/2019    Procedure: ESOPHAGOGASTRODUODENOSCOPY WITH ANESTHESIA;  Surgeon: Reuben Gruber DO;  Location: [facility] ENDOSCOPY;  Service: Gastroenterology    ENDOSCOPY N/A 9/26/2022    Procedure: ESOPHAGOGASTRODUODENOSCOPY WITH ANESTHESIA;  Surgeon: Reuben Gruber DO;  Location: [facility] ENDOSCOPY;  Service: Gastroenterology;  Laterality: N/A;  preop; GERD  postop; r/o barretts   PCP Esau Awad     EYE SURGERY      KNEE ARTHROSCOPY      right knee x 2    NECK SURGERY      ROTATOR CUFF REPAIR         Medications Prior  to Admission:    Medications Prior to Admission   Medication Sig Dispense Refill Last Dose/Taking    aspirin 81 MG chewable tablet Chew 1 tablet Daily.   Taking    diclofenac (VOLTAREN) 75 MG EC tablet Take 1 tablet by mouth 2 (Two) Times a Day.   Taking    NIFEdipine XL (PROCARDIA XL) 90 MG 24 hr tablet Take 1 tablet by mouth Daily.   12/3/2024 at  3:10 AM    omeprazole (PriLOSEC) 40 MG capsule Take 1 capsule by mouth Daily.   12/3/2024 at  3:10 AM    simvastatin (ZOCOR) 40 MG tablet Take 1 tablet by mouth Every Night.   Taking    tamsulosin (FLOMAX) 0.4 MG capsule 24 hr capsule Take 2 capsules by mouth Daily.   Taking    losartan (COZAAR) 50 MG tablet Take 1 tablet by mouth Daily.   12/1/2024       Allergies:  Penicillins, Terramycin [oxytetracycline], Codeine, Influenza vaccines, Lipitor [atorvastatin], and Mycinaire nasal mist [sodium chloride]    Social History:   Social History     Socioeconomic History    Marital status:    Tobacco Use    Smoking status: Former     Types: Cigarettes     Passive exposure: Past    Smokeless tobacco: Current     Types: Chew   Vaping Use    Vaping status: Never Used   Substance and Sexual Activity    Alcohol use: No    Drug use: No    Sexual activity: Defer       Family History:   Family History   Problem Relation Age of Onset    Heart disease Mother     Hypertension Mother     Colon cancer Father     Hypertension Father     Heart disease Father     Cancer Father     Hypertension Sister     Diabetes Sister     Hypertension Brother     Diabetes Paternal Grandmother     Colon polyps Neg Hx     Esophageal cancer Neg Hx            Physical Exam:    Vitals: /65 (BP Location: Right arm, Patient Position: Lying)   Pulse 65   Temp 98.1 °F (36.7 °C) (Oral)   Resp 16   Wt 74.9 kg (165 lb 2 oz)   SpO2 94%   BMI 28.02 kg/m²   Physical Exam      Lab Results (last 24 hours)       Procedure Component Value Units Date/Time    Creatinine, Body Fluid - Body Fluid, Peritoneum  [[id number]] Collected: 12/04/24 0419    Specimen: Body Fluid from Peritoneum Updated: 12/04/24 0443     Creatinine, Fluid 1.53 mg/dL     Narrative:      The reference interval and other method performance specifications are unavailable for this body fluid. Comparison of this result with the concentration in the blood, serum, or plasma is recommended.    Tissue Pathology Exam [[id number]] Collected: 12/03/24 0812    Specimen: Tissue from Prostate Updated: 12/03/24 1230    CBC & Differential [[id number]]  (Abnormal) Collected: 12/03/24 1141    Specimen: Blood Updated: 12/03/24 1147    Narrative:      The following orders were created for panel order CBC & Differential.  Procedure                               Abnormality         Status                     ---------                               -----------         ------                     CBC Auto Differential[[id number]]        Abnormal            Final result                 Please view results for these tests on the individual orders.    CBC Auto Differential [[id number]]  (Abnormal) Collected: 12/03/24 1141    Specimen: Blood Updated: 12/03/24 1147     WBC 9.53 10*3/mm3      RBC 3.42 10*6/mm3      Hemoglobin 10.3 g/dL      Hematocrit 31.2 %      MCV 91.2 fL      MCH 30.1 pg      MCHC 33.0 g/dL      RDW 13.7 %      RDW-SD 46.5 fl      MPV 9.6 fL      Platelets 137 10*3/mm3      Neutrophil % 86.1 %      Lymphocyte % 7.3 %      Monocyte % 5.4 %      Eosinophil % 0.3 %      Basophil % 0.4 %      Immature Grans % 0.5 %      Neutrophils, Absolute 8.20 10*3/mm3      Lymphocytes, Absolute 0.70 10*3/mm3      Monocytes, Absolute 0.51 10*3/mm3      Eosinophils, Absolute 0.03 10*3/mm3      Basophils, Absolute 0.04 10*3/mm3      Immature Grans, Absolute 0.05 10*3/mm3      nRBC 0.0 /100 WBC              -----------------------------------------------------------------    Radiology:     No results found.    Assessment and Plan     Primary Problem:  Prostate cancer  Post op  anemia  HTN    Hospital Problem list:    Prostate cancer      PMH:  Past Medical History:   Diagnosis Date    Allergic rhinitis     Ankylosis     Cancer     prostate    CVA (cerebral vascular accident)     DDD (degenerative disc disease), lumbar     GERD (gastroesophageal reflux disease)     Hypercholesteremia     Hypertension     Radiculopathy, lumbar region     Scalp psoriasis     Vertigo        Treatment Plan:  POD # 1 - S/P Radical prostatectomy  HTN -Stable  Post-Op anemia - stable.       Disposition: home    Oneil Awad MD 12/4/2024 06:41 CST

## 2024-12-04 NOTE — PLAN OF CARE
Goal Outcome Evaluation:                    Alert and oriented x4. No pain at this time. Discharge today. Educated on menjivar catheter leg bag and care. IV D/C x2.

## 2024-12-05 DIAGNOSIS — C61 PROSTATE CANCER: ICD-10-CM

## 2024-12-05 RX ORDER — DOCUSATE SODIUM 100 MG/1
100 CAPSULE, LIQUID FILLED ORAL 2 TIMES DAILY
Qty: 60 CAPSULE | Refills: 0 | Status: SHIPPED | OUTPATIENT
Start: 2024-12-05

## 2024-12-05 NOTE — PROGRESS NOTES
"Subjective    Mr. Saunders is 74 y.o. male    Chief Complaint: follow up menjivar catheter removal and wound check s/p RALP    History of Present Illness    74-year-old male established patient in for follow-up indwelling Menjivar catheter removal and wound check s/p RALP Dr. Matamoros 12/3/2024.  Patient reports has been doing \"excellent\" no pain reported.  Is not requiring any pain medication.  Patient is very appreciative and thinking staff for everything as patient reports his whole process from encounter with Dr. Matamoros through the hospital staff and our office staff has been top-notch.  Prostate cancer was diagnosed in April 2024 by Dr. Man.  PSA at the time was 5.2.  He underwent MRI February 2024 showed a 45 cc prostate.  He had a PI-RADS 3 lesion in the left mid transition zone and a another lesion in the left base central zone.  Systematic biopsy revealed Joyce 3+3 equal 6 1 out of 12 cores of the left lateral apex 5% of core involvement.  AUA symptom score 25/35 on Flomax.   Baseline ED.   PSA 6.1 (7/4/2024)  PSA 5.2 (1/13/2024)    The following portions of the patient's history were reviewed and updated as appropriate: allergies, current medications, past family history, past medical history, past social history, past surgical history and problem list.    Review of Systems   Constitutional:  Negative for chills, fatigue and fever.   Gastrointestinal:  Negative for nausea and vomiting.   Genitourinary:  Negative for difficulty urinating, frequency and urgency.         Current Outpatient Medications:     aspirin 81 MG chewable tablet, Chew 1 tablet Daily., Disp: , Rfl:     docusate sodium (COLACE) 100 MG capsule, Take 1 capsule by mouth 2 (Two) Times a Day., Disp: 60 capsule, Rfl: 0    hyoscyamine (Levsin/SL) 0.125 MG SL tablet, Take 1 tablet by mouth Every 4 (Four) Hours As Needed (bladder spasms)., Disp: 21 tablet, Rfl: 1    ketorolac (TORADOL) 10 MG tablet, Take 1 tablet by mouth Every 6 (Six) Hours As Needed for " Moderate Pain., Disp: 12 tablet, Rfl: 0    losartan (COZAAR) 50 MG tablet, Take 1 tablet by mouth Daily., Disp: , Rfl:     NIFEdipine XL (PROCARDIA XL) 90 MG 24 hr tablet, Take 1 tablet by mouth Daily., Disp: , Rfl:     omeprazole (PriLOSEC) 40 MG capsule, Take 1 capsule by mouth Daily., Disp: , Rfl:     oxyCODONE (Roxicodone) 5 MG immediate release tablet, Take 1 tablet by mouth Every 8 (Eight) Hours As Needed for Severe Pain., Disp: 10 tablet, Rfl: 0    sildenafil (VIAGRA) 100 MG tablet, Take 1 tablet by mouth Daily., Disp: 30 tablet, Rfl: 11    simvastatin (ZOCOR) 40 MG tablet, Take 1 tablet by mouth Every Night., Disp: , Rfl:     Past Medical History:   Diagnosis Date    Allergic rhinitis     Ankylosis     Cancer     prostate    CVA (cerebral vascular accident)     DDD (degenerative disc disease), lumbar     GERD (gastroesophageal reflux disease)     Hypercholesteremia     Hypertension     Radiculopathy, lumbar region     Scalp psoriasis     Vertigo        Past Surgical History:   Procedure Laterality Date    COLONOSCOPY  09/01/2011    normal    COLONOSCOPY N/A 8/27/2019    Procedure: COLONOSCOPY WITH ANESTHESIA;  Surgeon: Reuben Gruber DO;  Location: [facility] ENDOSCOPY;  Service: Gastroenterology    COLONOSCOPY N/A 9/4/2024    Procedure: COLONOSCOPY WITH ANESTHESIA;  Surgeon: Reuben Gruber DO;  Location: [facility] ENDOSCOPY;  Service: Gastroenterology;  Laterality: N/A;  pre: family hx colon cancer  post: diverticulosis  [place]    ENDOSCOPY  06/19/2015    stable dos santos esophagus    ENDOSCOPY N/A 8/29/2019    Procedure: ESOPHAGOGASTRODUODENOSCOPY WITH ANESTHESIA;  Surgeon: Reuben Gruber DO;  Location: [facility] ENDOSCOPY;  Service: Gastroenterology    ENDOSCOPY N/A 9/26/2022    Procedure: ESOPHAGOGASTRODUODENOSCOPY WITH ANESTHESIA;  Surgeon: Reuben Gruber DO;  Location: [facility] ENDOSCOPY;  Service: Gastroenterology;  Laterality: N/A;  preop; GERD  postop; r/o barretts   PCP Esau [place]     EYE  "SURGERY      KNEE ARTHROSCOPY      right knee x 2    NECK SURGERY      PROSTATECTOMY N/A 12/3/2024    Procedure: RADICAL PROSTATECTOMY LAPAROSCOPIC WITH DAVINCI ROBOT;  Surgeon: Laureano Matamoros MD;  Location: [facility];  Service: Robotics - DaVinci;  Laterality: N/A;    ROTATOR CUFF REPAIR         Social History     Socioeconomic History    Marital status:    Tobacco Use    Smoking status: Former     Types: Cigarettes     Passive exposure: Past    Smokeless tobacco: Current     Types: Chew   Vaping Use    Vaping status: Never Used   Substance and Sexual Activity    Alcohol use: No    Drug use: No    Sexual activity: Defer       Family History   Problem Relation Age of Onset    Heart disease Mother     Hypertension Mother     Colon cancer Father     Hypertension Father     Heart disease Father     Cancer Father     Hypertension Sister     Diabetes Sister     Hypertension Brother     Diabetes Paternal Grandmother     Colon polyps Neg Hx     Esophageal cancer Neg Hx        Objective    Temp 97.6 °F (36.4 °C)   Ht 163.5 cm (64.37\")   Wt 74.9 kg (165 lb 3.2 oz)   BMI 28.03 kg/m²     Physical Exam  Constitutional:       Appearance: Normal appearance.   Abdominal:      Tenderness: There is no right CVA tenderness or left CVA tenderness.   Genitourinary:     Comments: Indwelling Frazier catheter in place draining clear yellow urine  Skin:     General: Skin is warm and dry.      Comments: No erythema, edema or drainage noted from incision sites   Neurological:      Mental Status: He is alert and oriented to person, place, and time.   Psychiatric:         Mood and Affect: Mood normal.         Behavior: Behavior normal.             Results for orders placed or performed during the hospital encounter of 12/03/24   Tissue Pathology Exam    Collection Time: 12/03/24  8:12 AM    Specimen: Prostate; Tissue   Result Value Ref Range    Note to Patients       This report may contain a detailed description of human " tissue sent by a health care provider to the laboratory for pathologic evaluation. The content of this report is essential for diagnosis and may provide important critical findings. This information may be unfamiliar to patients to review without a medical professional present. It is advised that the patient review this report in the presence of a health care provider who can answer questions and explain the results.      Case Report       Surgical Pathology Report                         Case: [id number]                                  Authorizing Provider:  Laureano Matamoros MD  Collected:           12/03/2024 08:12 AM          Ordering Location:     [facility] OR  Received:            12/03/2024 12:30 PM          Pathologist:           Carlee De Dios MD                                                        Specimen:    Prostate, PROSTATE                                                                         Final Diagnosis       Prostate with right and left seminal vesicles, radical prostatectomy (51 g):  - Foci of adenocarcinoma, acinar type, Flowood grade 3+3 = 6 involving both the left and right lobes (Grade Group 1).  - Tumor involves 1 to 5% of the prostate.  - No extraprostatic extension of tumor identified.  - All margins of surgical resection are negative for malignancy.  - Foci of high-grade PIN.  - Benign prostatic hyperplasia.  - Foci of acute and chronic inflammation.  - Benign paraprostatic lymph node (1).  - Right and left seminal vesicles, negative for malignancy.    AJCC stage: pT2 pN0      Synoptic Checklist       PROSTATE GLAND: Radical Prostatectomy   PROSTATE GLAND: RADICAL PROSTATECTOMY - All Specimens   8th Edition - Protocol posted: 9/20/2023      SPECIMEN      Procedure:    Radical prostatectomy       Prostate Size:            Prostate Weight (Grams):    51 g        Prostate Greatest Dimension (Centimeters):    5.5 cm        Additional Prostate Dimension  (Centimeters):    4.5 cm        Additional Prostate Dimension (Centimeters):    4.5 cm      TUMOR      Histologic Type:    Acinar adenocarcinoma, conventional (usual)       Histologic Grade:            Grade:    Grade group 1 (Joyce Score 3 + 3 = 6)       Intraductal Carcinoma (IDC):    Not identified       Treatment Effect:    No known presurgical therapy       TUMOR QUANTITATION:            Estimated Percentage of Prostate Involved by Tumor:    1 - 5%     Extraprostatic Extension (EPE):    Not identified     Urinary Bladder Neck Invasion:    Not identified     Seminal Vesicle Invasion:    Not identified     Lymphatic and / or Vascular Invasion:    Not Identified       MARGINS    Margin Status:    All margins negative for invasive carcinoma       REGIONAL LYMPH NODES    Regional Lymph Node Status:          :    All regional lymph nodes negative for tumor       Number of Lymph Nodes Examined:    1       pTNM CLASSIFICATION (AJCC 8th Edition)      Reporting of pT, pN, and (when applicable) pM categories is based on information available to the pathologist at the time the report is issued. As per the AJCC (Chapter 1, 8th Ed.) it is the managing physician’s responsibility to establish the final pathologic stage based upon all pertinent information, including but potentially not limited to this pathology report.    pT Category:    pT2     pN Category:    pN0       ADDITIONAL FINDINGS    Additional Findings:    High-grade prostatic intraepithelial neoplasia (PIN)     Additional Findings:    Nodular prostatic hyperplasia     Additional Findings:    Inflammation (type): Foci of acute and chronic inflammation       Comment       Microscopic examination performed on all specimens.      Gross Description       1. Prostate.  Specimen is received in formalin labeled with the patient's name and medical record number.  The container is labeled prostate.  The specimen consists of a prostatectomy specimen weighing 51 g and  measuring 4.5 x 5.5 x 4.5 cm.  The right seminal vesicle measures 3.0 cm in length, 1.3 cm in diameter, right vas deferens measures 4.0 cm in length, 0.5 cm diameter, left vas deferens measures 1.3 cm in length, 0.4 cm in diameter, left seminal vesicle measures 3.5 cm in length, 1.4 cm in diameter.  The gland is covered by red pink-maroon roughened capsule.  The capsule is not intact at the bladder base.    Inked as follows:  Anterior margin-GREEN  Right side-BLUE  Left side-YELLOW  Posterior margin-BLACK    The distal urethra margin is shaved serially sectioned and submitted on edge.  The bladder base urethral margin is shaved serially sectioned and submitted on edge.  The tip of the right and left vas deferens are shaved and submitted en face.  The gland is serially sectioned from apex to base and reveals a light tan-pink bulging cut surface.  The cut surface has an edematous appearance.    Sections:  V7-WQ-kjuegn urethral margin shaved serially sectioned on edge  1D-1H-bladder base urethral margin shaved serially sectioned on edge  1I-tip of right vas deferens, en face  1J-tip of left vas deferens, en face  1K-1L-level 1, bisected in toto  1M-1N-level 2, bisected in toto  1O-1P-level 3, bisected in toto  1Q-1T-level 4, cross-sectioned in toto  1U-1X-level 5, cross-sectioned in toto  1Y-1BB-level 6, cross-section in toto  1CC-1FF-level 7, cross-sectioned in toto  1 GG-1JJ-level 8, cross-sectioned in toto  1 KK-1 NN-level 9, cross-sections in toto  100-1 PP-level tan, bisected in toto  1QQ-seminal vesicle, vas deferens adjacent to base  1 RR-seminal vesicle, vas deferens adjacent to base       CBC Auto Differential    Collection Time: 12/03/24 11:41 AM    Specimen: Blood   Result Value Ref Range    WBC 9.53 3.40 - 10.80 10*3/mm3    RBC 3.42 (L) 4.14 - 5.80 10*6/mm3    Hemoglobin 10.3 (L) 13.0 - 17.7 g/dL    Hematocrit 31.2 (L) 37.5 - 51.0 %    MCV 91.2 79.0 - 97.0 fL    MCH 30.1 26.6 - 33.0 pg    MCHC 33.0 31.5 -  35.7 g/dL    RDW 13.7 12.3 - 15.4 %    RDW-SD 46.5 37.0 - 54.0 fl    MPV 9.6 6.0 - 12.0 fL    Platelets 137 (L) 140 - 450 10*3/mm3    Neutrophil % 86.1 (H) 42.7 - 76.0 %    Lymphocyte % 7.3 (L) 19.6 - 45.3 %    Monocyte % 5.4 5.0 - 12.0 %    Eosinophil % 0.3 0.3 - 6.2 %    Basophil % 0.4 0.0 - 1.5 %    Immature Grans % 0.5 0.0 - 0.5 %    Neutrophils, Absolute 8.20 (H) 1.70 - 7.00 10*3/mm3    Lymphocytes, Absolute 0.70 0.70 - 3.10 10*3/mm3    Monocytes, Absolute 0.51 0.10 - 0.90 10*3/mm3    Eosinophils, Absolute 0.03 0.00 - 0.40 10*3/mm3    Basophils, Absolute 0.04 0.00 - 0.20 10*3/mm3    Immature Grans, Absolute 0.05 0.00 - 0.05 10*3/mm3    nRBC 0.0 0.0 - 0.2 /100 WBC   Creatinine, Body Fluid - Body Fluid, Peritoneum    Collection Time: 12/04/24  4:19 AM    Specimen: Peritoneum; Body Fluid   Result Value Ref Range    Creatinine, Fluid 1.53 mg/dL   CBC (No Diff)    Collection Time: 12/04/24  7:00 AM    Specimen: Blood   Result Value Ref Range    WBC 14.44 (H) 3.40 - 10.80 10*3/mm3    RBC 4.05 (L) 4.14 - 5.80 10*6/mm3    Hemoglobin 11.9 (L) 13.0 - 17.7 g/dL    Hematocrit 36.7 (L) 37.5 - 51.0 %    MCV 90.6 79.0 - 97.0 fL    MCH 29.4 26.6 - 33.0 pg    MCHC 32.4 31.5 - 35.7 g/dL    RDW 13.8 12.3 - 15.4 %    RDW-SD 46.3 37.0 - 54.0 fl    MPV 9.7 6.0 - 12.0 fL    Platelets 180 140 - 450 10*3/mm3   Basic Metabolic Panel    Collection Time: 12/04/24  7:00 AM    Specimen: Blood   Result Value Ref Range    Glucose 123 (H) 65 - 99 mg/dL    BUN 31 (H) 8 - 23 mg/dL    Creatinine 1.30 (H) 0.76 - 1.27 mg/dL    Sodium 137 136 - 145 mmol/L    Potassium 3.7 3.5 - 5.2 mmol/L    Chloride 103 98 - 107 mmol/L    CO2 22.0 22.0 - 29.0 mmol/L    Calcium 7.5 (L) 8.6 - 10.5 mg/dL    BUN/Creatinine Ratio 23.8 7.0 - 25.0    Anion Gap 12.0 5.0 - 15.0 mmol/L    eGFR 58.4 (L) >60.0 mL/min/1.73     Assessment and Plan    Diagnoses and all orders for this visit:    1. Prostate CA (Primary)  -     Discontinue: sildenafil (VIAGRA) 100 MG tablet; Take 1  tablet by mouth As Needed for Erectile Dysfunction.  Dispense: 30 tablet; Refill: 11  -     sildenafil (VIAGRA) 100 MG tablet; Take 1 tablet by mouth Daily.  Dispense: 30 tablet; Refill: 11  -     PSA DIAGNOSTIC; Future      Doing great postop no complaints    Incision sites no erythema edema or drainage noted    Have encouraged starting frequent daily Kegels-after indwelling Frazier catheter removal in office today    Will go ahead and start patient on sildenafil 100 mg daily for penile rehab    Keep scheduled follow-up Dr. Matamoros 6 weeks with PSA prior

## 2024-12-08 LAB
LAB AP CASE REPORT: NORMAL
LAB AP DIAGNOSIS COMMENT: NORMAL
LAB AP SYNOPTIC CHECKLIST: NORMAL
Lab: NORMAL
PATH REPORT.FINAL DX SPEC: NORMAL
PATH REPORT.GROSS SPEC: NORMAL

## 2024-12-09 ENCOUNTER — TELEPHONE (OUTPATIENT)
Dept: UROLOGY | Facility: CLINIC | Age: 74
End: 2024-12-09
Payer: MEDICARE

## 2024-12-09 NOTE — TELEPHONE ENCOUNTER
Called and discussed pathology with patient.  All questions answered.  Patient experiencing no post operative complications.

## 2024-12-11 ENCOUNTER — OFFICE VISIT (OUTPATIENT)
Dept: UROLOGY | Facility: CLINIC | Age: 74
End: 2024-12-11
Payer: MEDICARE

## 2024-12-11 VITALS — TEMPERATURE: 97.6 F | HEIGHT: 64 IN | WEIGHT: 165.2 LBS | BODY MASS INDEX: 28.2 KG/M2

## 2024-12-11 DIAGNOSIS — C61 PROSTATE CA: Primary | ICD-10-CM

## 2024-12-11 PROCEDURE — 99024 POSTOP FOLLOW-UP VISIT: CPT

## 2024-12-11 RX ORDER — SILDENAFIL 100 MG/1
100 TABLET, FILM COATED ORAL DAILY
Qty: 30 TABLET | Refills: 11 | Status: SHIPPED | OUTPATIENT
Start: 2024-12-11

## 2024-12-11 RX ORDER — SILDENAFIL 100 MG/1
100 TABLET, FILM COATED ORAL AS NEEDED
Qty: 30 TABLET | Refills: 11 | Status: SHIPPED | OUTPATIENT
Start: 2024-12-11 | End: 2024-12-11

## 2024-12-11 NOTE — PROGRESS NOTES
Laureano Saunders is a 74 y.o. male who is here today for catheter removal. Patient of ALY Robbins. 10cc syringe used to deflate the balloon and the catheter was removed without difficulty.  The patient tolerated this well and will return in 1 month to see ALY Robbins in the office for follow up. ALY Robbins was in the office at the time of procedure.     Patient was advised to drink clear fluids for the next couple hours and urinate. The patient was also advised he may experience some blood in the urine and burning with urination for the next couple days. If the patient is unable to urinate or develops fever, chills, N&V or suprapubic pain he will call to return for an appt at clinic or seek medical treatment at [facility], PCP or Urgent Care after hours. Patient verbalized understanding and all questions were answered.     I have reviewed and agree with medical assistance documentation above

## 2024-12-31 NOTE — PROGRESS NOTES
Subjective    Mr. Saunders is 74 y.o. male    Chief Complaint: Post op RALP    History of Present Illness  Patient is status post robotic assisted lap prostatectomy 12/3/2024.  Final pathology pT2 pN0 Joyce 3+3 equal 6 surgically margin negative adenocarcinoma the prostate. 5ppd NESTOR.  Patient on Viagra for ED.   Lab Results   Component Value Date    PSA 0.015 01/09/2025       The following portions of the patient's history were reviewed and updated as appropriate: allergies, current medications, past family history, past medical history, past social history, past surgical history and problem list.    Review of Systems      Current Outpatient Medications:     aspirin 81 MG chewable tablet, Chew 1 tablet Daily., Disp: , Rfl:     ketorolac (TORADOL) 10 MG tablet, Take 1 tablet by mouth Every 6 (Six) Hours As Needed for Moderate Pain., Disp: 12 tablet, Rfl: 0    losartan (COZAAR) 50 MG tablet, Take 1 tablet by mouth Daily., Disp: , Rfl:     NIFEdipine XL (PROCARDIA XL) 90 MG 24 hr tablet, Take 1 tablet by mouth Daily., Disp: , Rfl:     omeprazole (PriLOSEC) 40 MG capsule, Take 1 capsule by mouth Daily., Disp: , Rfl:     sildenafil (VIAGRA) 100 MG tablet, Take 1 tablet by mouth Daily., Disp: 30 tablet, Rfl: 11    simvastatin (ZOCOR) 40 MG tablet, Take 1 tablet by mouth Every Night., Disp: , Rfl:     Past Medical History:   Diagnosis Date    Allergic rhinitis     Ankylosis     Cancer     prostate    CVA (cerebral vascular accident)     DDD (degenerative disc disease), lumbar     GERD (gastroesophageal reflux disease)     Hypercholesteremia     Hypertension     Radiculopathy, lumbar region     Scalp psoriasis     Vertigo        Past Surgical History:   Procedure Laterality Date    COLONOSCOPY  09/01/2011    normal    COLONOSCOPY N/A 8/27/2019    Procedure: COLONOSCOPY WITH ANESTHESIA;  Surgeon: Reuben Gruber DO;  Location: [facility] ENDOSCOPY;  Service: Gastroenterology    COLONOSCOPY N/A 9/4/2024    Procedure: COLONOSCOPY  "WITH ANESTHESIA;  Surgeon: Reuben Gruber DO;  Location:  [facility] ENDOSCOPY;  Service: Gastroenterology;  Laterality: N/A;  pre: family hx colon cancer  post: diverticulosis  [place]    ENDOSCOPY  06/19/2015    stable dos santos esophagus    ENDOSCOPY N/A 8/29/2019    Procedure: ESOPHAGOGASTRODUODENOSCOPY WITH ANESTHESIA;  Surgeon: Reuben Gruber DO;  Location:  [facility] ENDOSCOPY;  Service: Gastroenterology    ENDOSCOPY N/A 9/26/2022    Procedure: ESOPHAGOGASTRODUODENOSCOPY WITH ANESTHESIA;  Surgeon: Reuben Gruber DO;  Location:  [facility] ENDOSCOPY;  Service: Gastroenterology;  Laterality: N/A;  preop; GERD  postop; r/o barretts   PCP Esau [place]     EYE SURGERY      KNEE ARTHROSCOPY      right knee x 2    NECK SURGERY      PROSTATECTOMY N/A 12/3/2024    Procedure: RADICAL PROSTATECTOMY LAPAROSCOPIC WITH DAVINCI ROBOT;  Surgeon: Laureano Matamoros MD;  Location: [facility] OR;  Service: Robotics - DaVinci;  Laterality: N/A;    ROTATOR CUFF REPAIR         Social History     Socioeconomic History    Marital status:    Tobacco Use    Smoking status: Former     Types: Cigarettes     Passive exposure: Past    Smokeless tobacco: Current     Types: Chew   Vaping Use    Vaping status: Never Used   Substance and Sexual Activity    Alcohol use: No    Drug use: No    Sexual activity: Defer       Family History   Problem Relation Age of Onset    Heart disease Mother     Hypertension Mother     Colon cancer Father     Hypertension Father     Heart disease Father     Cancer Father     Hypertension Sister     Diabetes Sister     Hypertension Brother     Diabetes Paternal Grandmother     Colon polyps Neg Hx     Esophageal cancer Neg Hx        Objective    Temp 97.8 °F (36.6 °C)   Ht 165.1 cm (65\")   Wt 75.9 kg (167 lb 6.4 oz)   BMI 27.86 kg/m²     Physical Exam  Skin:     Comments: Incisions healed with no evidence of hernia             Results for orders placed or performed in visit on 01/09/25   PSA DIAGNOSTIC    " Collection Time: 01/09/25  6:41 AM    Specimen: Blood   Result Value Ref Range    PSA 0.015 0.000 - 4.000 ng/mL     Assessment and Plan    Diagnoses and all orders for this visit:    1. Prostate CA (Primary)  -     Cancel: POC Urinalysis Dipstick, Multipro  -     PSA DIAGNOSTIC; Future    2. Male stress incontinence    3. Impotence of organic origin    Patient status post robotic assisted lap prostatectomy 12/3/2024.  Final pathology pT2 N0 Joyce 3+3 equal 6 adenocarcinoma prostate with negative surgical margins.  I believe his postop PSA was drawn a little early was 0.015.  I expected to be undetectable and we will check it again in 3 months.    Regarding his continence I did discuss referral to physical therapy he does not want to do that.  I will continue Kegel's exercises.    Continue Viagra for ED and penile rehabilitation.

## 2025-01-09 ENCOUNTER — LAB (OUTPATIENT)
Dept: LAB | Facility: HOSPITAL | Age: 75
End: 2025-01-09
Payer: MEDICARE

## 2025-01-09 DIAGNOSIS — C61 PROSTATE CA: ICD-10-CM

## 2025-01-09 LAB — PSA SERPL-MCNC: 0.01 NG/ML (ref 0–4)

## 2025-01-09 PROCEDURE — 84153 ASSAY OF PSA TOTAL: CPT

## 2025-01-09 PROCEDURE — 36415 COLL VENOUS BLD VENIPUNCTURE: CPT

## 2025-01-15 ENCOUNTER — OFFICE VISIT (OUTPATIENT)
Dept: UROLOGY | Facility: CLINIC | Age: 75
End: 2025-01-15
Payer: MEDICARE

## 2025-01-15 VITALS — WEIGHT: 167.4 LBS | TEMPERATURE: 97.8 F | BODY MASS INDEX: 27.89 KG/M2 | HEIGHT: 65 IN

## 2025-01-15 DIAGNOSIS — C61 PROSTATE CA: Primary | ICD-10-CM

## 2025-01-15 DIAGNOSIS — N39.3 MALE STRESS INCONTINENCE: ICD-10-CM

## 2025-01-15 DIAGNOSIS — N52.9 IMPOTENCE OF ORGANIC ORIGIN: ICD-10-CM

## 2025-01-21 ENCOUNTER — APPOINTMENT (OUTPATIENT)
Dept: CT IMAGING | Facility: HOSPITAL | Age: 75
DRG: 690 | End: 2025-01-21
Payer: MEDICARE

## 2025-01-21 ENCOUNTER — HOSPITAL ENCOUNTER (INPATIENT)
Facility: HOSPITAL | Age: 75
LOS: 2 days | Discharge: HOME | DRG: 690 | End: 2025-01-24
Attending: FAMILY MEDICINE | Admitting: FAMILY MEDICINE
Payer: MEDICARE

## 2025-01-21 DIAGNOSIS — R41.0 CONFUSION: Primary | ICD-10-CM

## 2025-01-21 DIAGNOSIS — Z74.09 IMPAIRED MOBILITY: ICD-10-CM

## 2025-01-21 DIAGNOSIS — C61 PROSTATE CANCER: ICD-10-CM

## 2025-01-21 DIAGNOSIS — N39.0 URINARY TRACT INFECTION WITHOUT HEMATURIA, SITE UNSPECIFIED: ICD-10-CM

## 2025-01-21 LAB
ALBUMIN SERPL-MCNC: 3.1 G/DL (ref 3.5–5.2)
ALBUMIN SERPL-MCNC: 3.4 G/DL (ref 3.5–5.2)
ALBUMIN/GLOB SERPL: 0.9 G/DL
ALBUMIN/GLOB SERPL: 1 G/DL
ALP SERPL-CCNC: 117 U/L (ref 39–117)
ALP SERPL-CCNC: 96 U/L (ref 39–117)
ALT SERPL W P-5'-P-CCNC: 28 U/L (ref 1–41)
ALT SERPL W P-5'-P-CCNC: 34 U/L (ref 1–41)
ANION GAP SERPL CALCULATED.3IONS-SCNC: 11 MMOL/L (ref 5–15)
ANION GAP SERPL CALCULATED.3IONS-SCNC: 14 MMOL/L (ref 5–15)
AST SERPL-CCNC: 18 U/L (ref 1–40)
AST SERPL-CCNC: 23 U/L (ref 1–40)
BACTERIA UR QL AUTO: ABNORMAL /HPF
BASOPHILS # BLD AUTO: 0.04 10*3/MM3 (ref 0–0.2)
BASOPHILS NFR BLD AUTO: 0.5 % (ref 0–1.5)
BILIRUB SERPL-MCNC: 0.4 MG/DL (ref 0–1.2)
BILIRUB SERPL-MCNC: 0.5 MG/DL (ref 0–1.2)
BILIRUB UR QL STRIP: NEGATIVE
BUN SERPL-MCNC: 18 MG/DL (ref 8–23)
BUN SERPL-MCNC: 21 MG/DL (ref 8–23)
BUN/CREAT SERPL: 17.1 (ref 7–25)
BUN/CREAT SERPL: 17.8 (ref 7–25)
CALCIUM SPEC-SCNC: 8.4 MG/DL (ref 8.6–10.5)
CALCIUM SPEC-SCNC: 9 MG/DL (ref 8.6–10.5)
CHLORIDE SERPL-SCNC: 102 MMOL/L (ref 98–107)
CHLORIDE SERPL-SCNC: 104 MMOL/L (ref 98–107)
CLARITY UR: CLEAR
CO2 SERPL-SCNC: 22 MMOL/L (ref 22–29)
CO2 SERPL-SCNC: 24 MMOL/L (ref 22–29)
COLOR UR: YELLOW
CREAT SERPL-MCNC: 1.01 MG/DL (ref 0.76–1.27)
CREAT SERPL-MCNC: 1.23 MG/DL (ref 0.76–1.27)
D-LACTATE SERPL-SCNC: 1.4 MMOL/L (ref 0.5–2)
DEPRECATED RDW RBC AUTO: 44.4 FL (ref 37–54)
EGFRCR SERPLBLD CKD-EPI 2021: 62.4 ML/MIN/1.73
EGFRCR SERPLBLD CKD-EPI 2021: 79 ML/MIN/1.73
EOSINOPHIL # BLD AUTO: 0.03 10*3/MM3 (ref 0–0.4)
EOSINOPHIL NFR BLD AUTO: 0.4 % (ref 0.3–6.2)
ERYTHROCYTE [DISTWIDTH] IN BLOOD BY AUTOMATED COUNT: 13.8 % (ref 12.3–15.4)
GLOBULIN UR ELPH-MCNC: 3.1 GM/DL
GLOBULIN UR ELPH-MCNC: 3.7 GM/DL
GLUCOSE SERPL-MCNC: 113 MG/DL (ref 65–99)
GLUCOSE SERPL-MCNC: 133 MG/DL (ref 65–99)
GLUCOSE UR STRIP-MCNC: NEGATIVE MG/DL
HCT VFR BLD AUTO: 40 % (ref 37.5–51)
HGB BLD-MCNC: 13.5 G/DL (ref 13–17.7)
HGB UR QL STRIP.AUTO: NEGATIVE
HOLD SPECIMEN: NORMAL
HYALINE CASTS UR QL AUTO: ABNORMAL /LPF
IMM GRANULOCYTES # BLD AUTO: 0.14 10*3/MM3 (ref 0–0.05)
IMM GRANULOCYTES NFR BLD AUTO: 1.9 % (ref 0–0.5)
KETONES UR QL STRIP: ABNORMAL
LEUKOCYTE ESTERASE UR QL STRIP.AUTO: ABNORMAL
LYMPHOCYTES # BLD AUTO: 0.38 10*3/MM3 (ref 0.7–3.1)
LYMPHOCYTES NFR BLD AUTO: 5.2 % (ref 19.6–45.3)
MAGNESIUM SERPL-MCNC: 1.9 MG/DL (ref 1.6–2.4)
MCH RBC QN AUTO: 29.6 PG (ref 26.6–33)
MCHC RBC AUTO-ENTMCNC: 33.8 G/DL (ref 31.5–35.7)
MCV RBC AUTO: 87.7 FL (ref 79–97)
MONOCYTES # BLD AUTO: 0.96 10*3/MM3 (ref 0.1–0.9)
MONOCYTES NFR BLD AUTO: 13 % (ref 5–12)
NEUTROPHILS NFR BLD AUTO: 5.81 10*3/MM3 (ref 1.7–7)
NEUTROPHILS NFR BLD AUTO: 79 % (ref 42.7–76)
NITRITE UR QL STRIP: NEGATIVE
NRBC BLD AUTO-RTO: 0 /100 WBC (ref 0–0.2)
PH UR STRIP.AUTO: 6.5 [PH] (ref 5–8)
PLATELET # BLD AUTO: 162 10*3/MM3 (ref 140–450)
PMV BLD AUTO: 10.2 FL (ref 6–12)
POTASSIUM SERPL-SCNC: 3.3 MMOL/L (ref 3.5–5.2)
POTASSIUM SERPL-SCNC: 3.7 MMOL/L (ref 3.5–5.2)
PROCALCITONIN SERPL-MCNC: 0.29 NG/ML (ref 0–0.25)
PROT SERPL-MCNC: 6.2 G/DL (ref 6–8.5)
PROT SERPL-MCNC: 7.1 G/DL (ref 6–8.5)
PROT UR QL STRIP: ABNORMAL
QT INTERVAL: 396 MS
QTC INTERVAL: 448 MS
RBC # BLD AUTO: 4.56 10*6/MM3 (ref 4.14–5.8)
RBC # UR STRIP: ABNORMAL /HPF
REF LAB TEST METHOD: ABNORMAL
SODIUM SERPL-SCNC: 138 MMOL/L (ref 136–145)
SODIUM SERPL-SCNC: 139 MMOL/L (ref 136–145)
SP GR UR STRIP: 1.01 (ref 1–1.03)
SQUAMOUS #/AREA URNS HPF: ABNORMAL /HPF
UROBILINOGEN UR QL STRIP: ABNORMAL
WBC # UR STRIP: ABNORMAL /HPF
WBC NRBC COR # BLD AUTO: 7.36 10*3/MM3 (ref 3.4–10.8)
WHOLE BLOOD HOLD COAG: NORMAL
WHOLE BLOOD HOLD SPECIMEN: NORMAL

## 2025-01-21 PROCEDURE — 87088 URINE BACTERIA CULTURE: CPT | Performed by: FAMILY MEDICINE

## 2025-01-21 PROCEDURE — 83735 ASSAY OF MAGNESIUM: CPT | Performed by: FAMILY MEDICINE

## 2025-01-21 PROCEDURE — 80053 COMPREHEN METABOLIC PANEL: CPT | Performed by: FAMILY MEDICINE

## 2025-01-21 PROCEDURE — 93005 ELECTROCARDIOGRAM TRACING: CPT

## 2025-01-21 PROCEDURE — 83605 ASSAY OF LACTIC ACID: CPT | Performed by: FAMILY MEDICINE

## 2025-01-21 PROCEDURE — 25010000002 CEFTRIAXONE PER 250 MG: Performed by: FAMILY MEDICINE

## 2025-01-21 PROCEDURE — 25810000003 SODIUM CHLORIDE 0.9 % SOLUTION: Performed by: FAMILY MEDICINE

## 2025-01-21 PROCEDURE — 99285 EMERGENCY DEPT VISIT HI MDM: CPT

## 2025-01-21 PROCEDURE — G0378 HOSPITAL OBSERVATION PER HR: HCPCS

## 2025-01-21 PROCEDURE — 93005 ELECTROCARDIOGRAM TRACING: CPT | Performed by: FAMILY MEDICINE

## 2025-01-21 PROCEDURE — 87186 SC STD MICRODIL/AGAR DIL: CPT | Performed by: FAMILY MEDICINE

## 2025-01-21 PROCEDURE — 36415 COLL VENOUS BLD VENIPUNCTURE: CPT

## 2025-01-21 PROCEDURE — 84145 PROCALCITONIN (PCT): CPT | Performed by: FAMILY MEDICINE

## 2025-01-21 PROCEDURE — 85025 COMPLETE CBC W/AUTO DIFF WBC: CPT | Performed by: FAMILY MEDICINE

## 2025-01-21 PROCEDURE — 25510000001 IOPAMIDOL 61 % SOLUTION: Performed by: FAMILY MEDICINE

## 2025-01-21 PROCEDURE — 81001 URINALYSIS AUTO W/SCOPE: CPT | Performed by: FAMILY MEDICINE

## 2025-01-21 PROCEDURE — 87086 URINE CULTURE/COLONY COUNT: CPT | Performed by: FAMILY MEDICINE

## 2025-01-21 PROCEDURE — 74177 CT ABD & PELVIS W/CONTRAST: CPT

## 2025-01-21 RX ORDER — SODIUM CHLORIDE 0.9 % (FLUSH) 0.9 %
10 SYRINGE (ML) INJECTION AS NEEDED
Status: DISCONTINUED | OUTPATIENT
Start: 2025-01-21 | End: 2025-01-24 | Stop reason: HOSPADM

## 2025-01-21 RX ORDER — SILDENAFIL 100 MG/1
100 TABLET, FILM COATED ORAL DAILY PRN
COMMUNITY

## 2025-01-21 RX ORDER — ATORVASTATIN CALCIUM 10 MG/1
20 TABLET, FILM COATED ORAL DAILY
Status: DISCONTINUED | OUTPATIENT
Start: 2025-01-21 | End: 2025-01-24 | Stop reason: HOSPADM

## 2025-01-21 RX ORDER — LOSARTAN POTASSIUM 50 MG/1
50 TABLET ORAL DAILY
Status: DISCONTINUED | OUTPATIENT
Start: 2025-01-21 | End: 2025-01-24 | Stop reason: HOSPADM

## 2025-01-21 RX ORDER — PANTOPRAZOLE SODIUM 40 MG/1
40 TABLET, DELAYED RELEASE ORAL
Status: DISCONTINUED | OUTPATIENT
Start: 2025-01-22 | End: 2025-01-24 | Stop reason: HOSPADM

## 2025-01-21 RX ORDER — DICLOFENAC SODIUM 75 MG/1
75 TABLET, DELAYED RELEASE ORAL 2 TIMES DAILY
COMMUNITY

## 2025-01-21 RX ORDER — ASPIRIN 81 MG/1
81 TABLET, CHEWABLE ORAL DAILY
Status: DISCONTINUED | OUTPATIENT
Start: 2025-01-22 | End: 2025-01-24 | Stop reason: HOSPADM

## 2025-01-21 RX ORDER — IOPAMIDOL 612 MG/ML
100 INJECTION, SOLUTION INTRAVASCULAR
Status: COMPLETED | OUTPATIENT
Start: 2025-01-21 | End: 2025-01-21

## 2025-01-21 RX ADMIN — LOSARTAN POTASSIUM 50 MG: 50 TABLET, FILM COATED ORAL at 20:10

## 2025-01-21 RX ADMIN — ATORVASTATIN CALCIUM 20 MG: 10 TABLET, FILM COATED ORAL at 20:10

## 2025-01-21 RX ADMIN — SODIUM CHLORIDE 1000 ML: 9 INJECTION, SOLUTION INTRAVENOUS at 15:02

## 2025-01-21 RX ADMIN — SODIUM CHLORIDE 1000 MG: 900 INJECTION INTRAVENOUS at 15:02

## 2025-01-21 RX ADMIN — IOPAMIDOL 100 ML: 612 INJECTION, SOLUTION INTRAVENOUS at 14:45

## 2025-01-21 NOTE — ED PROVIDER NOTES
Subjective   History of Present Illness  74-year-old male came from his primary care provider's office.  He has had multiple complaints.  He has been getting feverish, chills, fatigue.  Feels dehydrated.  And having episodes of confusion.          Review of Systems   Constitutional:  Positive for chills and fatigue.   Psychiatric/Behavioral:  Positive for confusion.    All other systems reviewed and are negative.      Past Medical History:   Diagnosis Date    Allergic rhinitis     Ankylosis     Cancer     prostate    CVA (cerebral vascular accident)     DDD (degenerative disc disease), lumbar     GERD (gastroesophageal reflux disease)     Hypercholesteremia     Hypertension     Radiculopathy, lumbar region     Scalp psoriasis     Vertigo        Allergies   Allergen Reactions    Penicillins Hives    Terramycin [Oxytetracycline] Hives    Codeine Irritability    Influenza Vaccines GI Intolerance    Lipitor [Atorvastatin] Myalgia    Mycinaire Nasal Mist [Sodium Chloride] Unknown - Low Severity       Past Surgical History:   Procedure Laterality Date    COLONOSCOPY  09/01/2011    normal    COLONOSCOPY N/A 8/27/2019    Procedure: COLONOSCOPY WITH ANESTHESIA;  Surgeon: Reuben Gruber DO;  Location: [facility] ENDOSCOPY;  Service: Gastroenterology    COLONOSCOPY N/A 9/4/2024    Procedure: COLONOSCOPY WITH ANESTHESIA;  Surgeon: Reuben Gruber DO;  Location: [facility] ENDOSCOPY;  Service: Gastroenterology;  Laterality: N/A;  pre: family hx colon cancer  post: diverticulosis  [place]    ENDOSCOPY  06/19/2015    stable dos santos esophagus    ENDOSCOPY N/A 8/29/2019    Procedure: ESOPHAGOGASTRODUODENOSCOPY WITH ANESTHESIA;  Surgeon: Reuben Gruber DO;  Location: [facility] ENDOSCOPY;  Service: Gastroenterology    ENDOSCOPY N/A 9/26/2022    Procedure: ESOPHAGOGASTRODUODENOSCOPY WITH ANESTHESIA;  Surgeon: Reuben Gruber DO;  Location: [facility] ENDOSCOPY;  Service: Gastroenterology;  Laterality: N/A;  preop; GERD  postop; r/o  aniya   PCP [facility]     EYE SURGERY      KNEE ARTHROSCOPY      right knee x 2    NECK SURGERY      PROSTATECTOMY N/A 12/3/2024    Procedure: RADICAL PROSTATECTOMY LAPAROSCOPIC WITH DAVINCI ROBOT;  Surgeon: Laureano Matamoros MD;  Location: [facility] OR;  Service: Robotics - DaVinci;  Laterality: N/A;    ROTATOR CUFF REPAIR         Family History   Problem Relation Age of Onset    Heart disease Mother     Hypertension Mother     Colon cancer Father     Hypertension Father     Heart disease Father     Cancer Father     Hypertension Sister     Diabetes Sister     Hypertension Brother     Diabetes Paternal Grandmother     Colon polyps Neg Hx     Esophageal cancer Neg Hx        Social History     Socioeconomic History    Marital status:    Tobacco Use    Smoking status: Former     Types: Cigarettes     Passive exposure: Past    Smokeless tobacco: Current     Types: Chew   Vaping Use    Vaping status: Never Used   Substance and Sexual Activity    Alcohol use: No    Drug use: No    Sexual activity: Defer           Objective   Physical Exam  Vitals and nursing note reviewed.   Constitutional:       Appearance: He is well-developed.   HENT:      Head: Normocephalic and atraumatic.      Mouth/Throat:      Mouth: Mucous membranes are moist.   Eyes:      Extraocular Movements: Extraocular movements intact.      Pupils: Pupils are equal, round, and reactive to light.   Cardiovascular:      Rate and Rhythm: Normal rate and regular rhythm.      Heart sounds: Normal heart sounds.   Pulmonary:      Effort: Pulmonary effort is normal.      Breath sounds: Normal breath sounds.   Abdominal:      General: Bowel sounds are normal.      Palpations: Abdomen is soft.      Tenderness: There is no abdominal tenderness.   Skin:     General: Skin is warm and dry.   Neurological:      Mental Status: He is alert and oriented to person, place, and time.      Cranial Nerves: No cranial nerve deficit.   Psychiatric:         Mood and  Affect: Mood normal.         Behavior: Behavior normal.         Procedures           ED Course                                                     Lab Results (last 24 hours)       Procedure Component Value Units Date/Time    Urinalysis With Microscopic If Indicated (No Culture) - Urine, Clean Catch [[id number]]  (Abnormal) Collected: 01/21/25 1241    Specimen: Urine, Clean Catch Updated: 01/21/25 1302     Color, UA Yellow     Appearance, UA Clear     pH, UA 6.5     Specific Gravity, UA 1.014     Glucose, UA Negative     Ketones, UA 15 mg/dL (1+)     Bilirubin, UA Negative     Blood, UA Negative     Protein, UA 30 mg/dL (1+)     Leuk Esterase, UA Trace     Nitrite, UA Negative     Urobilinogen, UA 1.0 E.U./dL    Urinalysis, Microscopic Only - Urine, Clean Catch [[id number]]  (Abnormal) Collected: 01/21/25 1241    Specimen: Urine, Clean Catch Updated: 01/21/25 1302     RBC, UA 3-5 /HPF      WBC, UA 11-20 /HPF      Bacteria, UA 3+ /HPF      Squamous Epithelial Cells, UA 0-2 /HPF      Hyaline Casts, UA 0-2 /LPF      Methodology Automated Microscopy    CBC & Differential [[id number]]  (Abnormal) Collected: 01/21/25 1246    Specimen: Blood Updated: 01/21/25 1303    Narrative:      The following orders were created for panel order CBC & Differential.  Procedure                               Abnormality         Status                     ---------                               -----------         ------                     CBC Auto Differential[[id number]]        Abnormal            Final result                 Please view results for these tests on the individual orders.    Comprehensive Metabolic Panel [[id number]]  (Abnormal) Collected: 01/21/25 1246    Specimen: Blood Updated: 01/21/25 1315     Glucose 133 mg/dL      BUN 21 mg/dL      Creatinine 1.23 mg/dL      Sodium 138 mmol/L      Potassium 3.3 mmol/L      Chloride 102 mmol/L      CO2 22.0 mmol/L      Calcium 9.0 mg/dL      Total Protein 7.1 g/dL      Albumin 3.4 g/dL       ALT (SGPT) 34 U/L      AST (SGOT) 23 U/L      Alkaline Phosphatase 117 U/L      Total Bilirubin 0.5 mg/dL      Globulin 3.7 gm/dL      A/G Ratio 0.9 g/dL      BUN/Creatinine Ratio 17.1     Anion Gap 14.0 mmol/L      eGFR 62.4 mL/min/1.73     Narrative:      GFR Categories in Chronic Kidney Disease (CKD)      GFR Category          GFR (mL/min/1.73)    Interpretation  G1                     90 or greater         Normal or high (1)  G2                      60-89                Mild decrease (1)  G3a                   45-59                Mild to moderate decrease  G3b                   30-44                Moderate to severe decrease  G4                    15-29                Severe decrease  G5                    14 or less           Kidney failure          (1)In the absence of evidence of kidney disease, neither GFR category G1 or G2 fulfill the criteria for CKD.    eGFR calculation 2021 CKD-EPI creatinine equation, which does not include race as a factor    CBC Auto Differential [[id number]]  (Abnormal) Collected: 01/21/25 1246    Specimen: Blood Updated: 01/21/25 1303     WBC 7.36 10*3/mm3      RBC 4.56 10*6/mm3      Hemoglobin 13.5 g/dL      Hematocrit 40.0 %      MCV 87.7 fL      MCH 29.6 pg      MCHC 33.8 g/dL      RDW 13.8 %      RDW-SD 44.4 fl      MPV 10.2 fL      Platelets 162 10*3/mm3      Neutrophil % 79.0 %      Lymphocyte % 5.2 %      Monocyte % 13.0 %      Eosinophil % 0.4 %      Basophil % 0.5 %      Immature Grans % 1.9 %      Neutrophils, Absolute 5.81 10*3/mm3      Lymphocytes, Absolute 0.38 10*3/mm3      Monocytes, Absolute 0.96 10*3/mm3      Eosinophils, Absolute 0.03 10*3/mm3      Basophils, Absolute 0.04 10*3/mm3      Immature Grans, Absolute 0.14 10*3/mm3      nRBC 0.0 /100 WBC           CT Abdomen Pelvis With Contrast    (Results Pending)     Medications   sodium chloride 0.9 % flush 10 mL (has no administration in time range)   cefTRIAXone (ROCEPHIN) 1,000 mg in sodium chloride 0.9 % 100  mL MBP (has no administration in time range)   sodium chloride 0.9 % bolus 1,000 mL (has no administration in time range)         Medical Decision Making  74-year-old male came in with multiple complaints.  Found to have urinary tract infection.  Fluids and Rocephin were given for the patient.  CT scan was ordered as per his primary care providers recommendations.  Patient will be admitted to Dr. Awad service at this time.          EMR Dragon/translation disclaimer: Much of this encounter note is an electronic transcription/translation of spoken language to printed text. The electronic translation of spoken language may be erroneous, or at times, nonsensical words or phrases may be inadvertently transcribed. Although I have reviewed the note for such errors, some may still exist.       Amount and/or Complexity of Data Reviewed  Labs: ordered. Decision-making details documented in ED Course.  Radiology: ordered. Decision-making details documented in ED Course.  ECG/medicine tests: ordered. Decision-making details documented in ED Course.    Risk  Prescription drug management.  Decision regarding hospitalization.        Final diagnoses:   Confusion   Urinary tract infection without hematuria, site unspecified       ED Disposition  ED Disposition       ED Disposition   Decision to Admit    Condition   --    Comment   Level of Care: Remote Telemetry [26]   Diagnosis: Urinary tract infection [910877]   Admitting Physician: TERI AWAD [[id number]]   Attending Physician: TERI AWAD [[id number]]                 No follow-up provider specified.       Medication List      No changes were made to your prescriptions during this visit.            Enzo Walton MD  01/21/25 3112

## 2025-01-22 PROBLEM — N39.0 UTI (URINARY TRACT INFECTION): Status: ACTIVE | Noted: 2025-01-22

## 2025-01-22 LAB
ALBUMIN SERPL-MCNC: 3.1 G/DL (ref 3.5–5.2)
ALBUMIN/GLOB SERPL: 1.1 G/DL
ALP SERPL-CCNC: 93 U/L (ref 39–117)
ALT SERPL W P-5'-P-CCNC: 26 U/L (ref 1–41)
ANION GAP SERPL CALCULATED.3IONS-SCNC: 11 MMOL/L (ref 5–15)
AST SERPL-CCNC: 19 U/L (ref 1–40)
BILIRUB SERPL-MCNC: 0.4 MG/DL (ref 0–1.2)
BUN SERPL-MCNC: 17 MG/DL (ref 8–23)
BUN/CREAT SERPL: 17.3 (ref 7–25)
CALCIUM SPEC-SCNC: 8.5 MG/DL (ref 8.6–10.5)
CHLORIDE SERPL-SCNC: 103 MMOL/L (ref 98–107)
CO2 SERPL-SCNC: 23 MMOL/L (ref 22–29)
CREAT SERPL-MCNC: 0.98 MG/DL (ref 0.76–1.27)
EGFRCR SERPLBLD CKD-EPI 2021: 81.9 ML/MIN/1.73
GLOBULIN UR ELPH-MCNC: 2.8 GM/DL
GLUCOSE SERPL-MCNC: 93 MG/DL (ref 65–99)
MAGNESIUM SERPL-MCNC: 1.9 MG/DL (ref 1.6–2.4)
POTASSIUM SERPL-SCNC: 3.3 MMOL/L (ref 3.5–5.2)
POTASSIUM SERPL-SCNC: 4.3 MMOL/L (ref 3.5–5.2)
PROT SERPL-MCNC: 5.9 G/DL (ref 6–8.5)
SODIUM SERPL-SCNC: 137 MMOL/L (ref 136–145)

## 2025-01-22 PROCEDURE — 97161 PT EVAL LOW COMPLEX 20 MIN: CPT

## 2025-01-22 PROCEDURE — 25010000002 METOCLOPRAMIDE PER 10 MG: Performed by: FAMILY MEDICINE

## 2025-01-22 PROCEDURE — 84132 ASSAY OF SERUM POTASSIUM: CPT | Performed by: FAMILY MEDICINE

## 2025-01-22 PROCEDURE — 80053 COMPREHEN METABOLIC PANEL: CPT | Performed by: FAMILY MEDICINE

## 2025-01-22 PROCEDURE — 25010000002 CEFTRIAXONE PER 250 MG: Performed by: FAMILY MEDICINE

## 2025-01-22 PROCEDURE — 83735 ASSAY OF MAGNESIUM: CPT | Performed by: FAMILY MEDICINE

## 2025-01-22 RX ORDER — POTASSIUM CHLORIDE 1500 MG/1
40 TABLET, EXTENDED RELEASE ORAL EVERY 4 HOURS
Status: COMPLETED | OUTPATIENT
Start: 2025-01-22 | End: 2025-01-22

## 2025-01-22 RX ORDER — METOCLOPRAMIDE HYDROCHLORIDE 5 MG/ML
10 INJECTION INTRAMUSCULAR; INTRAVENOUS EVERY 6 HOURS
Status: COMPLETED | OUTPATIENT
Start: 2025-01-22 | End: 2025-01-23

## 2025-01-22 RX ADMIN — ASPIRIN 81 MG: 81 TABLET, CHEWABLE ORAL at 08:50

## 2025-01-22 RX ADMIN — METOCLOPRAMIDE 10 MG: 5 INJECTION, SOLUTION INTRAMUSCULAR; INTRAVENOUS at 17:06

## 2025-01-22 RX ADMIN — POTASSIUM CHLORIDE 40 MEQ: 1500 TABLET, EXTENDED RELEASE ORAL at 12:32

## 2025-01-22 RX ADMIN — LOSARTAN POTASSIUM 50 MG: 50 TABLET, FILM COATED ORAL at 08:51

## 2025-01-22 RX ADMIN — NIFEDIPINE 90 MG: 60 TABLET, FILM COATED, EXTENDED RELEASE ORAL at 08:50

## 2025-01-22 RX ADMIN — PANTOPRAZOLE SODIUM 40 MG: 40 TABLET, DELAYED RELEASE ORAL at 05:45

## 2025-01-22 RX ADMIN — SODIUM CHLORIDE 1000 MG: 900 INJECTION INTRAVENOUS at 14:59

## 2025-01-22 RX ADMIN — POTASSIUM CHLORIDE 40 MEQ: 1500 TABLET, EXTENDED RELEASE ORAL at 08:51

## 2025-01-22 RX ADMIN — ATORVASTATIN CALCIUM 20 MG: 10 TABLET, FILM COATED ORAL at 08:49

## 2025-01-22 RX ADMIN — METOCLOPRAMIDE 10 MG: 5 INJECTION, SOLUTION INTRAMUSCULAR; INTRAVENOUS at 10:33

## 2025-01-22 RX ADMIN — METOCLOPRAMIDE 10 MG: 5 INJECTION, SOLUTION INTRAMUSCULAR; INTRAVENOUS at 22:57

## 2025-01-22 NOTE — CASE MANAGEMENT/SOCIAL WORK
Discharge Planning Assessment  [facility]     Patient Name: Laureano Saunders  MRN: [id number]  Today's Date: 1/22/2025    Admit Date: 1/21/2025        Discharge Needs Assessment       Row Name 01/22/25 0928       Living Environment    People in Home spouse;child(reza), adult    Name(s) of People in Home Jazz Saunders (Spouse)  [phone] (Mobile)    Current Living Arrangements home    Potentially Unsafe Housing Conditions none    In the past 12 months has the electric, gas, oil, or water company threatened to shut off services in your home? No    Primary Care Provided by self    Provides Primary Care For no one    Family Caregiver if Needed spouse    Family Caregiver Names Jazz Saunders (Spouse)  [phone] (Mobile)    Quality of Family Relationships helpful;involved;supportive    Able to Return to Prior Arrangements yes       Resource/Environmental Concerns    Resource/Environmental Concerns none    Transportation Concerns none       Transportation Needs    In the past 12 months, has lack of transportation kept you from medical appointments or from getting medications? no    In the past 12 months, has lack of transportation kept you from meetings, work, or from getting things needed for daily living? No       Food Insecurity    Within the past 12 months, you worried that your food would run out before you got the money to buy more. Never true    Within the past 12 months, the food you bought just didn't last and you didn't have money to get more. Never true       Transition Planning    Patient/Family Anticipates Transition to home with family    Patient/Family Anticipated Services at Transition none    Transportation Anticipated family or friend will provide       Discharge Needs Assessment    Equipment Currently Used at Home none    Concerns to be Addressed denies needs/concerns at this time    Do you want help finding or keeping work or a job? I do not need or want help    Do you want help with school or training?  For example, starting or completing job training or getting a high school diploma, GED or equivalent No    Anticipated Changes Related to Illness none    Equipment Needed After Discharge none    Discharge Coordination/Progress Lives independently at home with wife. No DME used. No needs voiced. PCP is Dr. Esau Awad.  Will return back home when discharged from hospital.  We are available if any needs arise.                   Discharge Plan    No documentation.                 Continued Care and Services - Admitted Since 1/21/2025    No active coordination exists for this encounter.          Demographic Summary    No documentation.                  Functional Status    No documentation.                  Psychosocial    No documentation.                  Abuse/Neglect    No documentation.                  Legal    No documentation.                  Substance Abuse    No documentation.                  Patient Forms    No documentation.                     Kassandra Porter RN

## 2025-01-22 NOTE — CONSULTS
[facility]   Consult Note    Patient Name: Laureano Saunders  : 1950  MRN: [id number]  Primary Care Physician:  Oneil Awad MD  Referring Physician: No Known Provider  Date of admission: 2025    Consults  Subjective   Subjective     Reason for Consult/ Chief Complaint: Pyelonephritis    History of Present Illness  Laureano Saunders is a 74 y.o. male status post robotic assisted lap prostatectomy 12/3/2024.  Postop PSA was drawn somewhat early was basically undetectable.  He was seen in the office 1/15/2025.  Patient was doing well at the time with main complaint of stress urinary incontinence.  He was admitted after developing fever and chills for approximately 48 hours.  CT scan was done which showed no free air or free pelvic fluid no fluid associated in the prostatectomy bed.  CT showed what appeared to be bilateral pyelonephritis with questionable approximate 1 cm abscesses in right and left kidney.  Urine culture with greater than 100,000 E. coli.  Patient on Rocephin.  Patient currently reports no abdominal pain or flank pain.  He did report dysuria and urgency for approximately 1 week.  Patient currently reports improvement in symptoms following antibiotic treatment.    Review of Systems   Constitutional:  Negative for chills and fever.   Gastrointestinal:  Negative for abdominal pain, anal bleeding and blood in stool.   Genitourinary:  Negative for dysuria and hematuria.        Personal History     Past Medical History:   Diagnosis Date    Allergic rhinitis     Ankylosis     Cancer     prostate    CVA (cerebral vascular accident)     DDD (degenerative disc disease), lumbar     GERD (gastroesophageal reflux disease)     Hypercholesteremia     Hypertension     Radiculopathy, lumbar region     Scalp psoriasis     Vertigo        Past Surgical History:   Procedure Laterality Date    COLONOSCOPY  2011    normal    COLONOSCOPY N/A 2019    Procedure: COLONOSCOPY WITH ANESTHESIA;   Surgeon: Reuben Gruber DO;  Location: [facility] ENDOSCOPY;  Service: Gastroenterology    COLONOSCOPY N/A 9/4/2024    Procedure: COLONOSCOPY WITH ANESTHESIA;  Surgeon: Reuben Gruber DO;  Location: [facility] ENDOSCOPY;  Service: Gastroenterology;  Laterality: N/A;  pre: family hx colon cancer  post: diverticulosis  [place]    ENDOSCOPY  06/19/2015    stable dos santos esophagus    ENDOSCOPY N/A 8/29/2019    Procedure: ESOPHAGOGASTRODUODENOSCOPY WITH ANESTHESIA;  Surgeon: Reuben Gruber DO;  Location: [facility] ENDOSCOPY;  Service: Gastroenterology    ENDOSCOPY N/A 9/26/2022    Procedure: ESOPHAGOGASTRODUODENOSCOPY WITH ANESTHESIA;  Surgeon: Reuben Gruber DO;  Location: [facility] ENDOSCOPY;  Service: Gastroenterology;  Laterality: N/A;  preop; GERD  postop; r/o barretts   PCP Esau [place]     EYE SURGERY      KNEE ARTHROSCOPY      right knee x 2    NECK SURGERY      PROSTATECTOMY N/A 12/3/2024    Procedure: RADICAL PROSTATECTOMY LAPAROSCOPIC WITH DAVINCI ROBOT;  Surgeon: Laureano Matamoros MD;  Location: [facility] OR;  Service: Robotics - DaVinci;  Laterality: N/A;    ROTATOR CUFF REPAIR         Family History: family history includes Cancer in his father; Colon cancer in his father; Diabetes in his paternal grandmother and sister; Heart disease in his father and mother; Hypertension in his brother, father, mother, and sister. Otherwise pertinent FHx was reviewed and not pertinent to current issue.    Social History:  reports that he has quit smoking. His smoking use included cigarettes. He has been exposed to tobacco smoke. His smokeless tobacco use includes chew. He reports that he does not drink alcohol and does not use drugs.    Home Medications:   NIFEdipine XL, aspirin, diclofenac, losartan, omeprazole, sildenafil, and simvastatin    Allergies:  Allergies   Allergen Reactions    Penicillins Hives    Terramycin [Oxytetracycline] Hives    Codeine Irritability    Influenza Vaccines GI Intolerance    Lipitor  [Atorvastatin] Myalgia    Mycinaire Nasal Mist [Sodium Chloride] Unknown - Low Severity       Objective    Objective     Vitals:  Temp:  [97.4 °F (36.3 °C)-98.4 °F (36.9 °C)] 97.4 °F (36.3 °C)  Heart Rate:  [70-96] 70  Resp:  [16-18] 18  BP: (136-157)/(70-76) 157/76    Physical Exam  Vitals reviewed.   Constitutional:       General: He is not in acute distress.     Appearance: He is well-developed. He is not diaphoretic.   Pulmonary:      Effort: Pulmonary effort is normal.   Abdominal:      General: There is no distension.      Palpations: Abdomen is soft. There is no mass.      Tenderness: There is no abdominal tenderness. There is no right CVA tenderness, left CVA tenderness, guarding or rebound.      Hernia: No hernia is present.   Skin:     General: Skin is warm and dry.   Neurological:      Mental Status: He is alert and oriented to person, place, and time.         Result Review    Result Review:  I have personally reviewed the results from the time of this admission to 1/22/2025 14:12 CST and agree with these findings:  [x]  Laboratory list / accordion  [x]  Microbiology  [x]  Radiology  []  EKG/Telemetry   []  Cardiology/Vascular   []  Pathology  [x]  Old records  []  Other:  Most notable findings include: CT independent review    The CT scan of the abdomen/pelvis done with and without contrast is available for me to review.  Treatment recommendations require an independent review.  First I scanned the liver, spleen, and bowel pattern.  The retroperitoneum including the major vessels and lymphatic packages are briefly reviewed.  This film has been reviewed by the radiologist to determine any nonurologic abnormalities that are present.  The kidneys are closely inspected for size, symmetry, contour, parenchymal thickness, perinephric reaction, presence of calcifications, and intrarenal dilation of the collecting system.  The ureters are inspected for their course, caliber, and any calcifications.  The bladder  is inspected for its thickness, size, and presence of any calcifications.  This scan shows:    The right kidney appears patchy attenuation consistent with pyelonephritis questionable abscess    The left kidney appears patchy attenuation consistent with pyelonephritis questionable abscess    The bladder appears prostate surgically absent no free fluid noted in the pelvis bladder appears normal and decompressed     Review Dr. Garcia's H&P as well as all notes related to urology summarized in HPI.    Greater than 100,000 E. coli sensitivities pending    White blood cell count 7000  Hemoglobin 13.5 hematocrit 40  Analysis 3-5 RBCs 11-20 white blood cells 3+ bacteria    Assessment & Plan   Assessment / Plan     Brief Patient Summary:  Laureano Saunders is a 74 y.o. male who status post robotic assisted laparoscopic prostatectomy 12/3/2024 with pyelonephritis    Active Hospital Problems:  Active Hospital Problems    Diagnosis     **Urinary tract infection     UTI (urinary tract infection)      Plan:   Reviewed CT imaging.  Areas appear to be consistent with pyelonephritis.  There is no drainable fluid collection.  No surgical or interventional radiology procedure necessary.  I would recommend antibiotics for at least 2 weeks for resolution.  Will need follow-up imaging in urology office approximately 6 weeks.    Laureano Matamoros MD

## 2025-01-22 NOTE — H&P
History and Physical    Patient:  Laureano Saunders  MRN: [id number]    CHIEF COMPLAINT: Fever chills abdominal pain with associated nausea vomiting x 4 weeks    History Obtained From: the patient   PCP: Oneil Awad MD    HISTORY OF PRESENT ILLNESS:   The patient is a 74 y.o. male who presents with 4-week history of progressively worsening abdominal distention nausea vomiting and abdominal pain.  Over the preceding 24 to 48 hours developed fever and chills as well.  Upon my evaluation in the office is felt the patient likely has a urinary tract infection and possible intra-abdominal abscess with ileus.  We attempted to directly admit him but unfortunately was told that there was no beds available and we had to send him to the emergency department where he subsequently got a bed.    REVIEW OF SYSTEMS:    Review of Systems   Constitutional:  Positive for activity change, appetite change, chills, fatigue and fever.   HENT: Negative.     Respiratory: Negative.     Cardiovascular: Negative.    Gastrointestinal:  Positive for abdominal distention, abdominal pain, nausea and vomiting.   Genitourinary:  Positive for difficulty urinating, dysuria, flank pain and frequency.   Neurological:  Positive for weakness.   Hematological: Negative.    Psychiatric/Behavioral: Negative.            Past Medical History:  Past Medical History:   Diagnosis Date    Allergic rhinitis     Ankylosis     Cancer     prostate    CVA (cerebral vascular accident)     DDD (degenerative disc disease), lumbar     GERD (gastroesophageal reflux disease)     Hypercholesteremia     Hypertension     Radiculopathy, lumbar region     Scalp psoriasis     Vertigo        Past Surgical History:  Past Surgical History:   Procedure Laterality Date    COLONOSCOPY  09/01/2011    normal    COLONOSCOPY N/A 8/27/2019    Procedure: COLONOSCOPY WITH ANESTHESIA;  Surgeon: Reuben Gruber DO;  Location: [facility] ENDOSCOPY;  Service: Gastroenterology     COLONOSCOPY N/A 9/4/2024    Procedure: COLONOSCOPY WITH ANESTHESIA;  Surgeon: Reuben Gruber DO;  Location:  [facility] ENDOSCOPY;  Service: Gastroenterology;  Laterality: N/A;  pre: family hx colon cancer  post: diverticulosis  [place]    ENDOSCOPY  06/19/2015    stable dos santos esophagus    ENDOSCOPY N/A 8/29/2019    Procedure: ESOPHAGOGASTRODUODENOSCOPY WITH ANESTHESIA;  Surgeon: Reuben Gruber DO;  Location:  [facility] ENDOSCOPY;  Service: Gastroenterology    ENDOSCOPY N/A 9/26/2022    Procedure: ESOPHAGOGASTRODUODENOSCOPY WITH ANESTHESIA;  Surgeon: Reuben Gruber DO;  Location:  [facility] ENDOSCOPY;  Service: Gastroenterology;  Laterality: N/A;  preop; GERD  postop; r/o barretts   PCP Esau [place]     EYE SURGERY      KNEE ARTHROSCOPY      right knee x 2    NECK SURGERY      PROSTATECTOMY N/A 12/3/2024    Procedure: RADICAL PROSTATECTOMY LAPAROSCOPIC WITH DAVINCI ROBOT;  Surgeon: Laureano Matamoros MD;  Location: [facility] OR;  Service: Robotics - DaVinci;  Laterality: N/A;    ROTATOR CUFF REPAIR         Medications Prior to Admission:    Medications Prior to Admission   Medication Sig Dispense Refill Last Dose/Taking    aspirin 81 MG chewable tablet Chew 1 tablet Daily.   1/21/2025    diclofenac (VOLTAREN) 75 MG EC tablet Take 1 tablet by mouth 2 (Two) Times a Day.   Taking    losartan (COZAAR) 50 MG tablet Take 1 tablet by mouth Daily.   1/20/2025    NIFEdipine XL (PROCARDIA XL) 90 MG 24 hr tablet Take 1 tablet by mouth Daily.   1/21/2025    omeprazole (PriLOSEC) 40 MG capsule Take 1 capsule by mouth Daily.   1/21/2025    simvastatin (ZOCOR) 40 MG tablet Take 1 tablet by mouth Every Night.   1/20/2025    sildenafil (VIAGRA) 100 MG tablet Take 1 tablet by mouth Daily As Needed for Erectile Dysfunction.          Allergies:  Penicillins, Terramycin [oxytetracycline], Codeine, Influenza vaccines, Lipitor [atorvastatin], and Mycinaire nasal mist [sodium chloride]    Social History:   Social History     Socioeconomic  "History    Marital status:    Tobacco Use    Smoking status: Former     Types: Cigarettes     Passive exposure: Past    Smokeless tobacco: Current     Types: Chew   Vaping Use    Vaping status: Never Used   Substance and Sexual Activity    Alcohol use: No    Drug use: No    Sexual activity: Defer       Family History:   Family History   Problem Relation Age of Onset    Heart disease Mother     Hypertension Mother     Colon cancer Father     Hypertension Father     Heart disease Father     Cancer Father     Hypertension Sister     Diabetes Sister     Hypertension Brother     Diabetes Paternal Grandmother     Colon polyps Neg Hx     Esophageal cancer Neg Hx            Physical Exam:    Vitals: /76 (BP Location: Left arm, Patient Position: Lying)   Pulse 70   Temp 97.4 °F (36.3 °C) (Oral)   Resp 18   Ht 165.1 cm (65\")   Wt 73.3 kg (161 lb 8 oz)   SpO2 97%   BMI 26.88 kg/m²   Physical Exam  Vitals and nursing note reviewed. Exam conducted with a chaperone present.   Constitutional:       Appearance: Normal appearance.   HENT:      Head: Normocephalic and atraumatic.      Mouth/Throat:      Mouth: Mucous membranes are moist.   Eyes:      Pupils: Pupils are equal, round, and reactive to light.   Cardiovascular:      Rate and Rhythm: Normal rate and regular rhythm.      Pulses: Normal pulses.      Heart sounds: Normal heart sounds.   Pulmonary:      Effort: Pulmonary effort is normal.      Breath sounds: Normal breath sounds.   Abdominal:      General: Abdomen is flat. Bowel sounds are normal. There is distension.      Tenderness: There is abdominal tenderness. There is right CVA tenderness, left CVA tenderness and guarding.   Skin:     General: Skin is warm.      Capillary Refill: Capillary refill takes less than 2 seconds.   Neurological:      General: No focal deficit present.      Mental Status: He is alert.           Lab Results (last 24 hours)       Procedure Component Value Units Date/Time    " Urine Culture - Urine, Urine, Clean Catch [[id number]]  (Abnormal) Collected: 01/21/25 1241    Specimen: Urine, Clean Catch Updated: 01/22/25 1103     Urine Culture >100,000 CFU/mL Escherichia coli    Narrative:      Colonization of the urinary tract without infection is common. Treatment is discouraged unless the patient is symptomatic, pregnant, or undergoing an invasive urologic procedure.    Magnesium [[id number]]  (Normal) Collected: 01/22/25 0450    Specimen: Blood Updated: 01/22/25 0538     Magnesium 1.9 mg/dL     Comprehensive Metabolic Panel [[id number]]  (Abnormal) Collected: 01/22/25 0450    Specimen: Blood Updated: 01/22/25 0538     Glucose 93 mg/dL      BUN 17 mg/dL      Creatinine 0.98 mg/dL      Sodium 137 mmol/L      Potassium 3.3 mmol/L      Chloride 103 mmol/L      CO2 23.0 mmol/L      Calcium 8.5 mg/dL      Total Protein 5.9 g/dL      Albumin 3.1 g/dL      ALT (SGPT) 26 U/L      AST (SGOT) 19 U/L      Alkaline Phosphatase 93 U/L      Total Bilirubin 0.4 mg/dL      Globulin 2.8 gm/dL      A/G Ratio 1.1 g/dL      BUN/Creatinine Ratio 17.3     Anion Gap 11.0 mmol/L      eGFR 81.9 mL/min/1.73     Narrative:      GFR Categories in Chronic Kidney Disease (CKD)      GFR Category          GFR (mL/min/1.73)    Interpretation  G1                     90 or greater         Normal or high (1)  G2                      60-89                Mild decrease (1)  G3a                   45-59                Mild to moderate decrease  G3b                   30-44                Moderate to severe decrease  G4                    15-29                Severe decrease  G5                    14 or less           Kidney failure          (1)In the absence of evidence of kidney disease, neither GFR category G1 or G2 fulfill the criteria for CKD.    eGFR calculation 2021 CKD-EPI creatinine equation, which does not include race as a factor    Magnesium [[id number]]  (Normal) Collected: 01/21/25 1937    Specimen: Blood Updated:  01/21/25 2012     Magnesium 1.9 mg/dL     Comprehensive Metabolic Panel [[id number]]  (Abnormal) Collected: 01/21/25 1937    Specimen: Blood Updated: 01/21/25 2012     Glucose 113 mg/dL      BUN 18 mg/dL      Creatinine 1.01 mg/dL      Sodium 139 mmol/L      Potassium 3.7 mmol/L      Chloride 104 mmol/L      CO2 24.0 mmol/L      Calcium 8.4 mg/dL      Total Protein 6.2 g/dL      Albumin 3.1 g/dL      ALT (SGPT) 28 U/L      AST (SGOT) 18 U/L      Alkaline Phosphatase 96 U/L      Total Bilirubin 0.4 mg/dL      Globulin 3.1 gm/dL      A/G Ratio 1.0 g/dL      BUN/Creatinine Ratio 17.8     Anion Gap 11.0 mmol/L      eGFR 79.0 mL/min/1.73     Narrative:      GFR Categories in Chronic Kidney Disease (CKD)      GFR Category          GFR (mL/min/1.73)    Interpretation  G1                     90 or greater         Normal or high (1)  G2                      60-89                Mild decrease (1)  G3a                   45-59                Mild to moderate decrease  G3b                   30-44                Moderate to severe decrease  G4                    15-29                Severe decrease  G5                    14 or less           Kidney failure          (1)In the absence of evidence of kidney disease, neither GFR category G1 or G2 fulfill the criteria for CKD.    eGFR calculation 2021 CKD-EPI creatinine equation, which does not include race as a factor    Procalcitonin [[id number]]  (Abnormal) Collected: 01/21/25 1246    Specimen: Blood Updated: 01/21/25 1413     Procalcitonin 0.29 ng/mL     Narrative:      As a Marker for Sepsis (Non-Neonates):    1. <0.5 ng/mL represents a low risk of severe sepsis and/or septic shock.  2. >2 ng/mL represents a high risk of severe sepsis and/or septic shock.    As a Marker for Lower Respiratory Tract Infections that require antibiotic therapy:    PCT on Admission    Antibiotic Therapy       6-12 Hrs later    >0.5                Strongly Recommended  >0.25 - <0.5        Recommended  "  0.1 - 0.25          Discouraged              Remeasure/reassess PCT  <0.1                Strongly Discouraged     Remeasure/reassess PCT    As 28 day mortality risk marker: \"Change in Procalcitonin Result\" (>80% or <=80%) if Day 0 (or Day 1) and Day 4 values are available. Refer to http://www.[facility] [place] [facility] [place]pct[place]cal[place]    Change in PCT <=80%  A decrease of PCT levels below or equal to 80% defines a positive change in PCT test result representing a higher risk for 28-day all-cause mortality of patients diagnosed with severe sepsis for septic shock.    Change in PCT >80%  A decrease of PCT levels of more than 80% defines a negative change in PCT result representing a lower risk for 28-day all-cause mortality of patients diagnosed with severe sepsis or septic shock.       Lactic Acid, Plasma [[id number]]  (Normal) Collected: 01/21/25 1246    Specimen: Blood Updated: 01/21/25 1404     Lactate 1.4 mmol/L              -----------------------------------------------------------------    Radiology:     CT Abdomen Pelvis With Contrast    Result Date: 1/21/2025  EXAMINATION: CT ABDOMEN PELVIS W CONTRAST- 1/21/2025 2:51 PM  HISTORY: Altered mental status; R41.0-Disorientation, unspecified; N39.0-Urinary tract infection, site not specified  TOTAL DOSE: 305.43 mGy.cm (Automatic exposure control technique was implemented in an effort to keep the radiation dose as low as possible without compromising image quality)  REPORT: Spiral CT of the abdomen and pelvis was performed after administration of intravenous contrast from the lung bases through the pubic symphysis. Reconstructed coronal and sagittal images are also reviewed.  COMPARISON: There are no correlative imaging studies for comparison.  Review of the lung windows demonstrates mild respiratory motion artifact, the lung bases are clear. The stomach is decompressed, the gallbladder appears unremarkable. The liver and spleen are homogeneous without evidence of a mass. The " pancreas appears within normal limits. There is somewhat lobular thickening of both adrenal glands, could be related to adrenal hyperplasia or adrenal adenomas. The kidney on the left enhances normally there are 2 cysts within the left renal cortex, with a benign appearance. The largest measures 2.4 cm, at the upper pole medially. Small parapelvic cysts are suspected within the renal hilum on the left. On the right, there is patchy enhancement of the right kidney, suspicious for acute pyelonephritis. Questionable 10 mm cyst versus a small developing abscess within the medial aspect of the left mid kidney seen on images 45 through 47. There is a separate questionable small abscess or cyst in the lateral cortex of the right mid kidney measuring 11 mm. The ureters are decompressed. There is mild urothelial thickening involving the right renal pelvis and right ureter. The bladder is decompressed.  No free fluid or free air is identified. Bowel loops are normal in caliber and appear unremarkable. The appendix is normal. Small accessory splenule is noted medial to the spleen. There is abnormal increased attenuation of fat planes within the central mesentery compatible with mesenteric panniculitis. No intra-abdominal or pelvic lymphadenopathy is identified. Review of bone windows shows no acute osseous abnormality. Fused and unfused syndesmophytes are seen throughout the spine. There are degenerative changes of both hips, including CAM deformities which can be associated with chronic impingement.      1. Findings compatible with acute pyelonephritis, possibly with 2 small developing cortical abscesses within the right kidney, each measuring approximately 10 to 11 mm. The left kidney appears within normal limits. Urothelial thickening of the right renal pelvis and right ureter compatible with UTI. 2. Increased attenuation of mesenteric fat planes compatible with mesenteric panniculitis. 3. Normal appendix.    This report  was signed and finalized on 1/21/2025 2:57 PM by Dr. Oneil Small MD.        Assessment and Plan     Primary Problem:  Urinary tract infection  Pyelonephritis  Cortical abscesses  Panniculitis    Hospital Problem list:    Urinary tract infection    UTI (urinary tract infection)      PMH:  Past Medical History:   Diagnosis Date    Allergic rhinitis     Ankylosis     Cancer     prostate    CVA (cerebral vascular accident)     DDD (degenerative disc disease), lumbar     GERD (gastroesophageal reflux disease)     Hypercholesteremia     Hypertension     Radiculopathy, lumbar region     Scalp psoriasis     Vertigo        Treatment Plan:  IV antibiotics in the form of Rocephin  IV fluid resuscitation  IV analgesia  IV antiemetics  Urology consultation  Follow clinical course    Disposition: Home    Oneil Awad MD 1/22/2025 13:28 CST

## 2025-01-22 NOTE — PLAN OF CARE
Goal Outcome Evaluation:  Plan of Care Reviewed With: patient           Outcome Evaluation: PT eval complete. Pt in fowlers position, AOx4 without complaints currently, reports indep at baseline and hopes to return home soon. Pt brought self to EOB with Mod I and stood Indep upon bringing self to EOB. Pt ambulated in [place] 300' with Spv/SBA and performed safe stair negotiation to simulate home activities and demo with no deficits. Pt returned to room and left in fowlers position. Currently no skiled needs identified and PT to sign off. pt educated to continue mobilizing as tolerated and call for assist as needed. Anticipate d/c home with assist when medically stable. please reconsult with change in status or need regarding this pt care.    Anticipated Discharge Disposition (PT): home

## 2025-01-22 NOTE — PLAN OF CARE
Goal Outcome Evaluation:  Plan of Care Reviewed With: patient        Progress: no change  Outcome Evaluation: Arrived from ED in stable condition. Awaiting admission orders from MD.

## 2025-01-22 NOTE — THERAPY DISCHARGE NOTE
Patient Name: Laureano Saunders  : 1950    MRN: [id number]                              Today's Date: 2025       Admit Date: 2025    Visit Dx:     ICD-10-CM ICD-9-CM   1. Confusion  R41.0 298.9   2. Urinary tract infection without hematuria, site unspecified  N39.0 599.0   3. Impaired mobility [Z74.09]  Z74.09 799.89     Patient Active Problem List   Diagnosis    Dehydration    Syncope and collapse    Essential hypertension    CAP (community acquired pneumonia)    Right-sided epistaxis    Dos Santos's esophagus determined by biopsy    Family history of colon cancer    Sensorineural hearing loss (SNHL) of right ear with restricted hearing of left ear    Mixed conductive and sensorineural hearing loss of left ear with restricted hearing of right ear    Tinnitus of both ears    Impacted cerumen of left ear    Degeneration of lumbar intervertebral disc    Gastroesophageal reflux disease    Prostate cancer    Urinary tract infection    UTI (urinary tract infection)     Past Medical History:   Diagnosis Date    Allergic rhinitis     Ankylosis     Cancer     prostate    CVA (cerebral vascular accident)     DDD (degenerative disc disease), lumbar     GERD (gastroesophageal reflux disease)     Hypercholesteremia     Hypertension     Radiculopathy, lumbar region     Scalp psoriasis     Vertigo      Past Surgical History:   Procedure Laterality Date    COLONOSCOPY  2011    normal    COLONOSCOPY N/A 2019    Procedure: COLONOSCOPY WITH ANESTHESIA;  Surgeon: Reuben Gruber DO;  Location: [facility] ENDOSCOPY;  Service: Gastroenterology    COLONOSCOPY N/A 2024    Procedure: COLONOSCOPY WITH ANESTHESIA;  Surgeon: Reuben Gruber DO;  Location: [facility] ENDOSCOPY;  Service: Gastroenterology;  Laterality: N/A;  pre: family hx colon cancer  post: diverticulosis  [place]    ENDOSCOPY  2015    stable dos santos esophagus    ENDOSCOPY N/A 2019    Procedure: ESOPHAGOGASTRODUODENOSCOPY WITH ANESTHESIA;   Surgeon: Reuben Gruber DO;  Location: [facility] ENDOSCOPY;  Service: Gastroenterology    ENDOSCOPY N/A 9/26/2022    Procedure: ESOPHAGOGASTRODUODENOSCOPY WITH ANESTHESIA;  Surgeon: Reuben Gruber DO;  Location: [facility] ENDOSCOPY;  Service: Gastroenterology;  Laterality: N/A;  preop; GERD  postop; r/o barretts   PCP Esau [place]     EYE SURGERY      KNEE ARTHROSCOPY      right knee x 2    NECK SURGERY      PROSTATECTOMY N/A 12/3/2024    Procedure: RADICAL PROSTATECTOMY LAPAROSCOPIC WITH DAVINCI ROBOT;  Surgeon: Laureano Matamoros MD;  Location: [facility] OR;  Service: Robotics - DaVinci;  Laterality: N/A;    ROTATOR CUFF REPAIR        General Information       Row Name 01/22/25 1300          Physical Therapy Time and Intention    Document Type evaluation  pt presents with general malaise and fatigue Dx; UTI  -     Mode of Treatment physical therapy  -       Row Name 01/22/25 1300          General Information    Patient Profile Reviewed yes  -AJ     Prior Level of Function independent:;community mobility;gait;home management;transfer;bed mobility;ADL's  -     Barriers to Rehab medically complex  -       Row Name 01/22/25 1300          Living Environment    People in Home spouse;child(reza), adult  -       Row Name 01/22/25 1300          Home Main Entrance    Number of Stairs, Main Entrance four  -AJ     Stair Railings, Main Entrance railing on right side (ascending)  -       Row Name 01/22/25 1300          Stairs Within Home, Primary    Number of Stairs, Within Home, Primary four  -AJ     Stair Railings, Within Home, Primary railing on left side (ascending)  -       Row Name 01/22/25 1300          Cognition    Orientation Status (Cognition) oriented x 4  -AJ               User Key  (r) = Recorded By, (t) = Taken By, (c) = Cosigned By      Initials Name Provider Type    AJ Sharath Salcido, PT DPT Physical Therapist                   Mobility       Row Name 01/22/25 1300          Bed Mobility    Bed  Mobility rolling left;supine-sit;sit-supine  -     Rolling Left [place] (Bed Mobility) modified independence  -     Supine-Sit [place] (Bed Mobility) modified independence  -     Sit-Supine [place] (Bed Mobility) independent  -     Assistive Device (Bed Mobility) bed rails;head of bed elevated  -[facility] Name 01/22/25 1300          Bed-Chair Transfer    Bed-Chair [place] (Transfers) supervision  -     Comment, (Bed-Chair Transfer) HHA as needed  -[facility] Name 01/22/25 1300          Sit-Stand Transfer    Sit-Stand [place] (Transfers) independent  -[facility] Name 01/22/25 1300          Gait/Stairs (Locomotion)    [place] Level (Gait) standby assist;supervision  -     Distance in Feet (Gait) 300  -     [place] Level (Stairs) supervision  -     Handrail Location (Stairs) right side (ascending);left side (descending)  -     Number of Steps (Stairs) 10  -     Ascending Technique (Stairs) step-over-step  -     Descending Technique (Stairs) step-over-step  -               User Key  (r) = Recorded By, (t) = Taken By, (c) = Cosigned By      Initials Name Provider Type    Sharath Ernandez, BRIAN DPT Physical Therapist                   Obj/Interventions       [facility] Name 01/22/25 1300          Range of Motion Comprehensive    General Range of Motion no range of motion deficits identified  -     Comment, General Range of Motion no formal AROM or MMT, pt stood EOB without deficits and ambulated in [facility] Name 01/22/25 1300          Strength Comprehensive (MMT)    General Manual Muscle Testing (MMT) Assessment no strength deficits identified  -     Comment, General Manual Muscle Testing (MMT) Assessment no formal AROM or MMT, pt stood EOB without deficits and ambulated in [facility] Name 01/22/25 1300          Balance    Balance Assessment sitting static balance;sitting dynamic balance;standing static balance;standing dynamic balance  -      Static Sitting Balance independent  -AJ     Dynamic Sitting Balance independent  -AJ     Position, Sitting Balance unsupported;sitting edge of bed  -AJ     Static Standing Balance independent  -AJ     Dynamic Standing Balance independent  -AJ     Position/Device Used, Standing Balance unsupported  -AJ     Balance Interventions sitting;standing;sit to stand;supported;static;dynamic  -       Row Name 01/22/25 1300          Sensory Assessment (Somatosensory)    Sensory Assessment (Somatosensory) sensation intact  -               User Key  (r) = Recorded By, (t) = Taken By, (c) = Cosigned By      Initials Name Provider Type    Sharath Ernandez, PT DPT Physical Therapist                   Goals/Plan    No documentation.                  Clinical Impression       Row Name 01/22/25 1300          Pain    Pretreatment Pain Rating 0/10 - no pain  -     Posttreatment Pain Rating 0/10 - no pain  -     Pain Management Interventions nursing notified  -     Response to Pain Interventions no change per patient report  -       Row Name 01/22/25 1300          Plan of Care Review    Plan of Care Reviewed With patient  -     Outcome Evaluation PT eval complete. Pt in fowlers position, AOx4 without complaints currently, reports indep at baseline and hopes to return home soon. Pt brought self to EOB with Mod I and stood Indep upon bringing self to EOB. Pt ambulated in [place] 300' with Spv/SBA and performed safe stair negotiation to simulate home activities and demo with no deficits. Pt returned to room and left in fowlers position. Currently no skiled needs identified and PT to sign off. pt educated to continue mobilizing as tolerated and call for assist as needed. Anticipate d/c home with assist when medically stable. please reconsult with change in status or need regarding this pt care.  -       Row Name 01/22/25 1300          Therapy Assessment/Plan (PT)    Criteria for Skilled Interventions Met (PT) no;does not meet  criteria for skilled intervention;no problems identified which require skilled intervention  -     Therapy Frequency (PT) evaluation only  -AJ       Row Name 01/22/25 1300          Vital Signs    Pre Patient Position Supine  -AJ     Intra Patient Position Standing  -AJ     Post Patient Position Supine  -AJ       Row Name 01/22/25 1300          Positioning and Restraints    Pre-Treatment Position in bed  -AJ     Post Treatment Position bed  -AJ     In Bed notified nsg;fowlers;call light within reach;encouraged to call for assist;side rails up x2  -               User Key  (r) = Recorded By, (t) = Taken By, (c) = Cosigned By      Initials Name Provider Type    Sharath Ernandez PT DPT Physical Therapist                   Outcome Measures       Row Name 01/22/25 1300 01/22/25 0900       How much help from another person do you currently need...    Turning from your back to your side while in flat bed without using bedrails? 4  -AJ 4  -EE    Moving from lying on back to sitting on the side of a flat bed without bedrails? 4  -AJ 4  -EE    Moving to and from a bed to a chair (including a wheelchair)? 4  -AJ 4  -EE    Standing up from a chair using your arms (e.g., wheelchair, bedside chair)? 4  -AJ 4  -EE    Climbing 3-5 steps with a railing? 4  -AJ 4  -EE    To walk in hospital room? 4  -AJ 4  -EE    AM-PAC 6 Clicks Score (PT) 24  - 24  -EE    Highest Level of Mobility Goal 8 --> Walked 250 feet or more  - 8 --> Walked 250 feet or more  -EE      Row Name 01/22/25 1300          Functional Assessment    Outcome Measure Options AM-PAC 6 Clicks Basic Mobility (PT)  -               User Key  (r) = Recorded By, (t) = Taken By, (c) = Cosigned By      Initials Name Provider Type    Sharath Ernandez PT DPT Physical Therapist    Shanna Ramsay RN Registered Nurse                  Physical Therapy Education       Title: PT OT SLP Therapies (Done)       Topic: Physical Therapy (Done)       Point: Mobility training  (Done)       Learning Progress Summary            Patient Acceptance, E, VU by CHHAYA at 1/22/2025 1416    Comment: role of PT, no skilled needs present, mobility ad jo                      Point: Home exercise program (Done)       Learning Progress Summary            Patient Acceptance, E, VU by CHHAYA at 1/22/2025 1416    Comment: role of PT, no skilled needs present, mobility ad jo                      Point: Body mechanics (Done)       Learning Progress Summary            Patient Acceptance, E, VU by CHHAYA at 1/22/2025 1416    Comment: role of PT, no skilled needs present, mobility ad jo                      Point: Precautions (Done)       Learning Progress Summary            Patient Acceptance, E, VU by CHHAYA at 1/22/2025 1416    Comment: role of PT, no skilled needs present, mobility ad jo                                      User Key       Initials Effective Dates Name Provider Type Discipline     08/15/24 -  Sharath Salcido, PT DPT Physical Therapist PT                  PT Recommendation and Plan     Outcome Evaluation: PT eval complete. Pt in fowlers position, AOx4 without complaints currently, reports indep at baseline and hopes to return home soon. Pt brought self to EOB with Mod I and stood Indep upon bringing self to EOB. Pt ambulated in [place] 300' with Spv/SBA and performed safe stair negotiation to simulate home activities and demo with no deficits. Pt returned to room and left in fowlers position. Currently no skiled needs identified and PT to sign off. pt educated to continue mobilizing as tolerated and call for assist as needed. Anticipate d/c home with assist when medically stable. please reconsult with change in status or need regarding this pt care.     Time Calculation:         PT Charges       Row Name 01/22/25 1300             Time Calculation    Start Time 1300  -      Stop Time 1338  -      Time Calculation (min) 38 min  -      PT Received On 01/22/25  -         Untimed Charges    PT  Eval/Re-eval Minutes 38  -AJ         Total Minutes    Untimed Charges Total Minutes 38  -AJ       Total Minutes 38  -AJ                User Key  (r) = Recorded By, (t) = Taken By, (c) = Cosigned By      Initials Name Provider Type    Sharath Ernandez, PT DPT Physical Therapist                  Therapy Charges for Today       Code Description Service Date Service Provider Modifiers Qty    [id number] HC PT EVAL LOW COMPLEXITY 3 1/22/2025 Sharath Salcido, PT DPT GP 1            PT G-Codes  Outcome Measure Options: AM-PAC 6 Clicks Basic Mobility (PT)  AM-PAC 6 Clicks Score (PT): 24    PT Discharge Summary  Anticipated Discharge Disposition (PT): home    Sharath Salcido PT DPT  1/22/2025

## 2025-01-23 LAB
ALBUMIN SERPL-MCNC: 3 G/DL (ref 3.5–5.2)
ALBUMIN/GLOB SERPL: 1 G/DL
ALP SERPL-CCNC: 92 U/L (ref 39–117)
ALT SERPL W P-5'-P-CCNC: 27 U/L (ref 1–41)
ANION GAP SERPL CALCULATED.3IONS-SCNC: 12 MMOL/L (ref 5–15)
AST SERPL-CCNC: 23 U/L (ref 1–40)
BACTERIA SPEC AEROBE CULT: ABNORMAL
BILIRUB SERPL-MCNC: 0.4 MG/DL (ref 0–1.2)
BUN SERPL-MCNC: 20 MG/DL (ref 8–23)
BUN/CREAT SERPL: 15.9 (ref 7–25)
CALCIUM SPEC-SCNC: 8.4 MG/DL (ref 8.6–10.5)
CHLORIDE SERPL-SCNC: 105 MMOL/L (ref 98–107)
CO2 SERPL-SCNC: 22 MMOL/L (ref 22–29)
CREAT SERPL-MCNC: 1.26 MG/DL (ref 0.76–1.27)
EGFRCR SERPLBLD CKD-EPI 2021: 60.6 ML/MIN/1.73
GLOBULIN UR ELPH-MCNC: 3 GM/DL
GLUCOSE SERPL-MCNC: 86 MG/DL (ref 65–99)
MAGNESIUM SERPL-MCNC: 1.9 MG/DL (ref 1.6–2.4)
POTASSIUM SERPL-SCNC: 3.9 MMOL/L (ref 3.5–5.2)
PROT SERPL-MCNC: 6 G/DL (ref 6–8.5)
SODIUM SERPL-SCNC: 139 MMOL/L (ref 136–145)

## 2025-01-23 PROCEDURE — 83735 ASSAY OF MAGNESIUM: CPT | Performed by: FAMILY MEDICINE

## 2025-01-23 PROCEDURE — 25010000002 METOCLOPRAMIDE PER 10 MG: Performed by: FAMILY MEDICINE

## 2025-01-23 PROCEDURE — 80053 COMPREHEN METABOLIC PANEL: CPT | Performed by: FAMILY MEDICINE

## 2025-01-23 PROCEDURE — 25010000002 CEFTRIAXONE PER 250 MG: Performed by: FAMILY MEDICINE

## 2025-01-23 RX ADMIN — ASPIRIN 81 MG: 81 TABLET, CHEWABLE ORAL at 08:43

## 2025-01-23 RX ADMIN — LOSARTAN POTASSIUM 50 MG: 50 TABLET, FILM COATED ORAL at 08:43

## 2025-01-23 RX ADMIN — NIFEDIPINE 90 MG: 60 TABLET, FILM COATED, EXTENDED RELEASE ORAL at 08:44

## 2025-01-23 RX ADMIN — METOCLOPRAMIDE 10 MG: 5 INJECTION, SOLUTION INTRAMUSCULAR; INTRAVENOUS at 05:45

## 2025-01-23 RX ADMIN — SODIUM CHLORIDE 1000 MG: 900 INJECTION INTRAVENOUS at 15:02

## 2025-01-23 RX ADMIN — ATORVASTATIN CALCIUM 20 MG: 10 TABLET, FILM COATED ORAL at 08:43

## 2025-01-23 RX ADMIN — PANTOPRAZOLE SODIUM 40 MG: 40 TABLET, DELAYED RELEASE ORAL at 05:45

## 2025-01-23 NOTE — PLAN OF CARE
Problem: Adult Inpatient Plan of Care  Goal: Plan of Care Review  Outcome: Progressing  Goal: Patient-Specific Goal (Individualized)  Outcome: Progressing  Goal: Absence of Hospital-Acquired Illness or Injury  Outcome: Progressing  Intervention: Identify and Manage Fall Risk  Recent Flowsheet Documentation  Taken 1/23/2025 0600 by Crystal Panchal RN  Safety Promotion/Fall Prevention: safety round/check completed  Taken 1/23/2025 0400 by Crystal Panchal RN  Safety Promotion/Fall Prevention: safety round/check completed  Taken 1/23/2025 0200 by Crystal Panchal RN  Safety Promotion/Fall Prevention: safety round/check completed  Taken 1/23/2025 0000 by Crystal Panchal RN  Safety Promotion/Fall Prevention: safety round/check completed  Taken 1/22/2025 2200 by Crystal Panchal RN  Safety Promotion/Fall Prevention: safety round/check completed  Taken 1/22/2025 2100 by Crystal Panchal RN  Safety Promotion/Fall Prevention: safety round/check completed  Taken 1/22/2025 2000 by Crystal Panchal RN  Safety Promotion/Fall Prevention: safety round/check completed  Intervention: Prevent Skin Injury  Recent Flowsheet Documentation  Taken 1/22/2025 2000 by Crystal Panchal RN  Body Position: position changed independently  Goal: Optimal Comfort and Wellbeing  Outcome: Progressing  Intervention: Provide Person-Centered Care  Recent Flowsheet Documentation  Taken 1/22/2025 2000 by Crystal Panchal RN  Trust Relationship/Rapport:   care explained   choices provided   questions answered   questions encouraged   reassurance provided  Goal: Readiness for Transition of Care  Outcome: Progressing       Goal Outcome Evaluation:      No changes overnight.

## 2025-01-23 NOTE — PROGRESS NOTES
Daily Progress Note  Laureano Saunders  MRN: [id number] LOS: 1    Admit Date: 1/21/2025 1/23/2025 07:51 CST    Subjective:      Chief Complaint:  Chief Complaint   Patient presents with    Fatigue    Chills    Bloated    Altered Mental Status       Interval History:    Reviewed overnight events and nursing notes.   Reports feeling much better today.  His abdominal pain is significantly less and his nausea is improved.  He was able to eat dinner last night.    Review of Systems   Constitutional:  Positive for activity change, chills and fever.   HENT: Negative.     Respiratory: Negative.     Cardiovascular: Negative.    Gastrointestinal:  Positive for abdominal distention and abdominal pain.   Genitourinary:  Positive for dysuria.   Neurological:  Positive for weakness.       DIET:  Diet: Cardiac; Healthy Heart (2-3 Na+); Fluid Consistency: Thin (IDDSI 0)    Medications:      aspirin, 81 mg, Oral, Daily  atorvastatin, 20 mg, Oral, Daily  cefTRIAXone, 1,000 mg, Intravenous, Q24H  losartan, 50 mg, Oral, Daily  NIFEdipine XL, 90 mg, Oral, Daily  pantoprazole, 40 mg, Oral, Q AM        Data:     Code Status:   Code Status and Medical Interventions: CPR (Attempt to Resuscitate); Full Support   Ordered at: 01/21/25 1918     Level Of Support Discussed With:    Patient     Code Status (Patient has no pulse and is not breathing):    CPR (Attempt to Resuscitate)     Medical Interventions (Patient has pulse or is breathing):    Full Support       Family History   Problem Relation Age of Onset    Heart disease Mother     Hypertension Mother     Colon cancer Father     Hypertension Father     Heart disease Father     Cancer Father     Hypertension Sister     Diabetes Sister     Hypertension Brother     Diabetes Paternal Grandmother     Colon polyps Neg Hx     Esophageal cancer Neg Hx      Social History     Socioeconomic History    Marital status:    Tobacco Use    Smoking status: Former     Types: Cigarettes     Passive  exposure: Past    Smokeless tobacco: Current     Types: Chew   Vaping Use    Vaping status: Never Used   Substance and Sexual Activity    Alcohol use: No    Drug use: No    Sexual activity: Defer       Labs:    Lab Results (last 72 hours)       Procedure Component Value Units Date/Time    Magnesium [[id number]]  (Normal) Collected: 01/23/25 0341    Specimen: Blood Updated: 01/23/25 0427     Magnesium 1.9 mg/dL     Comprehensive Metabolic Panel [[id number]]  (Abnormal) Collected: 01/23/25 0341    Specimen: Blood Updated: 01/23/25 0427     Glucose 86 mg/dL      BUN 20 mg/dL      Creatinine 1.26 mg/dL      Sodium 139 mmol/L      Potassium 3.9 mmol/L      Chloride 105 mmol/L      CO2 22.0 mmol/L      Calcium 8.4 mg/dL      Total Protein 6.0 g/dL      Albumin 3.0 g/dL      ALT (SGPT) 27 U/L      AST (SGOT) 23 U/L      Alkaline Phosphatase 92 U/L      Total Bilirubin 0.4 mg/dL      Globulin 3.0 gm/dL      A/G Ratio 1.0 g/dL      BUN/Creatinine Ratio 15.9     Anion Gap 12.0 mmol/L      eGFR 60.6 mL/min/1.73     Narrative:      GFR Categories in Chronic Kidney Disease (CKD)      GFR Category          GFR (mL/min/1.73)    Interpretation  G1                     90 or greater         Normal or high (1)  G2                      60-89                Mild decrease (1)  G3a                   45-59                Mild to moderate decrease  G3b                   30-44                Moderate to severe decrease  G4                    15-29                Severe decrease  G5                    14 or less           Kidney failure          (1)In the absence of evidence of kidney disease, neither GFR category G1 or G2 fulfill the criteria for CKD.    eGFR calculation 2021 CKD-EPI creatinine equation, which does not include race as a factor    Potassium [[id number]]  (Normal) Collected: 01/22/25 1623    Specimen: Blood Updated: 01/22/25 1650     Potassium 4.3 mmol/L     Urine Culture - Urine, Urine, Clean Catch [[id number]]  (Abnormal)  Collected: 01/21/25 1241    Specimen: Urine, Clean Catch Updated: 01/22/25 1103     Urine Culture >100,000 CFU/mL Escherichia coli    Narrative:      Colonization of the urinary tract without infection is common. Treatment is discouraged unless the patient is symptomatic, pregnant, or undergoing an invasive urologic procedure.    Magnesium [[id number]]  (Normal) Collected: 01/22/25 0450    Specimen: Blood Updated: 01/22/25 0538     Magnesium 1.9 mg/dL     Comprehensive Metabolic Panel [[id number]]  (Abnormal) Collected: 01/22/25 0450    Specimen: Blood Updated: 01/22/25 0538     Glucose 93 mg/dL      BUN 17 mg/dL      Creatinine 0.98 mg/dL      Sodium 137 mmol/L      Potassium 3.3 mmol/L      Chloride 103 mmol/L      CO2 23.0 mmol/L      Calcium 8.5 mg/dL      Total Protein 5.9 g/dL      Albumin 3.1 g/dL      ALT (SGPT) 26 U/L      AST (SGOT) 19 U/L      Alkaline Phosphatase 93 U/L      Total Bilirubin 0.4 mg/dL      Globulin 2.8 gm/dL      A/G Ratio 1.1 g/dL      BUN/Creatinine Ratio 17.3     Anion Gap 11.0 mmol/L      eGFR 81.9 mL/min/1.73     Narrative:      GFR Categories in Chronic Kidney Disease (CKD)      GFR Category          GFR (mL/min/1.73)    Interpretation  G1                     90 or greater         Normal or high (1)  G2                      60-89                Mild decrease (1)  G3a                   45-59                Mild to moderate decrease  G3b                   30-44                Moderate to severe decrease  G4                    15-29                Severe decrease  G5                    14 or less           Kidney failure          (1)In the absence of evidence of kidney disease, neither GFR category G1 or G2 fulfill the criteria for CKD.    eGFR calculation 2021 CKD-EPI creatinine equation, which does not include race as a factor    Magnesium [[id number]]  (Normal) Collected: 01/21/25 1937    Specimen: Blood Updated: 01/21/25 2012     Magnesium 1.9 mg/dL     Comprehensive Metabolic Panel  [[id number]]  (Abnormal) Collected: 01/21/25 1937    Specimen: Blood Updated: 01/21/25 2012     Glucose 113 mg/dL      BUN 18 mg/dL      Creatinine 1.01 mg/dL      Sodium 139 mmol/L      Potassium 3.7 mmol/L      Chloride 104 mmol/L      CO2 24.0 mmol/L      Calcium 8.4 mg/dL      Total Protein 6.2 g/dL      Albumin 3.1 g/dL      ALT (SGPT) 28 U/L      AST (SGOT) 18 U/L      Alkaline Phosphatase 96 U/L      Total Bilirubin 0.4 mg/dL      Globulin 3.1 gm/dL      A/G Ratio 1.0 g/dL      BUN/Creatinine Ratio 17.8     Anion Gap 11.0 mmol/L      eGFR 79.0 mL/min/1.73     Narrative:      GFR Categories in Chronic Kidney Disease (CKD)      GFR Category          GFR (mL/min/1.73)    Interpretation  G1                     90 or greater         Normal or high (1)  G2                      60-89                Mild decrease (1)  G3a                   45-59                Mild to moderate decrease  G3b                   30-44                Moderate to severe decrease  G4                    15-29                Severe decrease  G5                    14 or less           Kidney failure          (1)In the absence of evidence of kidney disease, neither GFR category G1 or G2 fulfill the criteria for CKD.    eGFR calculation 2021 CKD-EPI creatinine equation, which does not include race as a factor    Procalcitonin [[id number]]  (Abnormal) Collected: 01/21/25 1246    Specimen: Blood Updated: 01/21/25 1413     Procalcitonin 0.29 ng/mL     Narrative:      As a Marker for Sepsis (Non-Neonates):    1. <0.5 ng/mL represents a low risk of severe sepsis and/or septic shock.  2. >2 ng/mL represents a high risk of severe sepsis and/or septic shock.    As a Marker for Lower Respiratory Tract Infections that require antibiotic therapy:    PCT on Admission    Antibiotic Therapy       6-12 Hrs later    >0.5                Strongly Recommended  >0.25 - <0.5        Recommended   0.1 - 0.25          Discouraged              Remeasure/reassess PCT  <0.1  "               Strongly Discouraged     Remeasure/reassess PCT    As 28 day mortality risk marker: \"Change in Procalcitonin Result\" (>80% or <=80%) if Day 0 (or Day 1) and Day 4 values are available. Refer to [email or web address]-[email or web address]    Change in PCT <=80%  A decrease of PCT levels below or equal to 80% defines a positive change in PCT test result representing a higher risk for 28-day all-cause mortality of patients diagnosed with severe sepsis for septic shock.    Change in PCT >80%  A decrease of PCT levels of more than 80% defines a negative change in PCT result representing a lower risk for 28-day all-cause mortality of patients diagnosed with severe sepsis or septic shock.       Lactic Acid, Plasma [[id number]]  (Normal) Collected: 01/21/25 1246    Specimen: Blood Updated: 01/21/25 1404     Lactate 1.4 mmol/L     Comprehensive Metabolic Panel [[id number]]  (Abnormal) Collected: 01/21/25 1246    Specimen: Blood Updated: 01/21/25 1315     Glucose 133 mg/dL      BUN 21 mg/dL      Creatinine 1.23 mg/dL      Sodium 138 mmol/L      Potassium 3.3 mmol/L      Chloride 102 mmol/L      CO2 22.0 mmol/L      Calcium 9.0 mg/dL      Total Protein 7.1 g/dL      Albumin 3.4 g/dL      ALT (SGPT) 34 U/L      AST (SGOT) 23 U/L      Alkaline Phosphatase 117 U/L      Total Bilirubin 0.5 mg/dL      Globulin 3.7 gm/dL      A/G Ratio 0.9 g/dL      BUN/Creatinine Ratio 17.1     Anion Gap 14.0 mmol/L      eGFR 62.4 mL/min/1.73     Narrative:      GFR Categories in Chronic Kidney Disease (CKD)      GFR Category          GFR (mL/min/1.73)    Interpretation  G1                     90 or greater         Normal or high (1)  G2                      60-89                Mild decrease (1)  G3a                   45-59                Mild to moderate decrease  G3b                   30-44                Moderate to severe decrease  G4                    15-29                Severe decrease  G5                    14 or less           " Kidney failure          (1)In the absence of evidence of kidney disease, neither GFR category G1 or G2 fulfill the criteria for CKD.    eGFR calculation 2021 CKD-EPI creatinine equation, which does not include race as a factor    CBC & Differential [[id number]]  (Abnormal) Collected: 01/21/25 1246    Specimen: Blood Updated: 01/21/25 1303    Narrative:      The following orders were created for panel order CBC & Differential.  Procedure                               Abnormality         Status                     ---------                               -----------         ------                     CBC Auto Differential[[id number]]        Abnormal            Final result                 Please view results for these tests on the individual orders.    CBC Auto Differential [[id number]]  (Abnormal) Collected: 01/21/25 1246    Specimen: Blood Updated: 01/21/25 1303     WBC 7.36 10*3/mm3      RBC 4.56 10*6/mm3      Hemoglobin 13.5 g/dL      Hematocrit 40.0 %      MCV 87.7 fL      MCH 29.6 pg      MCHC 33.8 g/dL      RDW 13.8 %      RDW-SD 44.4 fl      MPV 10.2 fL      Platelets 162 10*3/mm3      Neutrophil % 79.0 %      Lymphocyte % 5.2 %      Monocyte % 13.0 %      Eosinophil % 0.4 %      Basophil % 0.5 %      Immature Grans % 1.9 %      Neutrophils, Absolute 5.81 10*3/mm3      Lymphocytes, Absolute 0.38 10*3/mm3      Monocytes, Absolute 0.96 10*3/mm3      Eosinophils, Absolute 0.03 10*3/mm3      Basophils, Absolute 0.04 10*3/mm3      Immature Grans, Absolute 0.14 10*3/mm3      nRBC 0.0 /100 WBC     Urinalysis With Microscopic If Indicated (No Culture) - Urine, Clean Catch [[id number]]  (Abnormal) Collected: 01/21/25 1241    Specimen: Urine, Clean Catch Updated: 01/21/25 1302     Color, UA Yellow     Appearance, UA Clear     pH, UA 6.5     Specific Gravity, UA 1.014     Glucose, UA Negative     Ketones, UA 15 mg/dL (1+)     Bilirubin, UA Negative     Blood, UA Negative     Protein, UA 30 mg/dL (1+)     Leuk Esterase, UA  Trace     Nitrite, UA Negative     Urobilinogen, UA 1.0 E.U./dL    Urinalysis, Microscopic Only - Urine, Clean Catch [[id number]]  (Abnormal) Collected: 01/21/25 1241    Specimen: Urine, Clean Catch Updated: 01/21/25 1302     RBC, UA 3-5 /HPF      WBC, UA 11-20 /HPF      Bacteria, UA 3+ /HPF      Squamous Epithelial Cells, UA 0-2 /HPF      Hyaline Casts, UA 0-2 /LPF      Methodology Automated Microscopy    [place] Draw [[id number]] Collected: 01/21/25 1246    Specimen: Blood Updated: 01/21/25 1300    Narrative:      The following orders were created for panel order [place] Draw.  Procedure                               Abnormality         Status                     ---------                               -----------         ------                     Green Top (Gel)[[id number]]                                  Final result               Lavender Top[[id number]]                                     Final result               Red Top[[id number]]                                          Final result               [place]man Top[[id number]]                                         Final result               Light Blue Top[[id number]]                                   Final result                 Please view results for these tests on the individual orders.    Green Top (Gel) [[id number]] Collected: 01/21/25 1246    Specimen: Blood Updated: 01/21/25 1300     Extra Tube Hold for add-ons.     Comment: Auto resulted.       Lavender Top [[id number]] Collected: 01/21/25 1246    Specimen: Blood Updated: 01/21/25 1300     Extra Tube hold for add-on     Comment: Auto resulted       Red Top [[id number]] Collected: 01/21/25 1246    Specimen: Blood Updated: 01/21/25 1300     Extra Tube Hold for add-ons.     Comment: Auto resulted.       Newman Top [[id number]] Collected: 01/21/25 1246    Specimen: Blood Updated: 01/21/25 1300     Extra Tube Hold for add-ons.     Comment: Auto resulted.       Light Blue Top [[id number]] Collected: 01/21/25 1246     "Specimen: Blood Updated: 25 1300     Extra Tube Hold for add-ons.     Comment: Auto resulted                 Objective:     Vitals: /71 (BP Location: Left arm, Patient Position: Lying)   Pulse 62   Temp 98.3 °F (36.8 °C) (Oral)   Resp 16   Ht 165.1 cm (65\")   Wt 73.3 kg (161 lb 8 oz)   SpO2 94%   BMI 26.88 kg/m²    Intake/Output Summary (Last 24 hours) at 2025 0751  Last data filed at 2025 0900  Gross per 24 hour   Intake 240 ml   Output --   Net 240 ml    Temp (24hrs), Av °F (36.7 °C), Min:97.4 °F (36.3 °C), Max:98.9 °F (37.2 °C)      Physical Exam  Vitals and nursing note reviewed. Exam conducted with a chaperone present.   Constitutional:       Appearance: Normal appearance.   HENT:      Head: Normocephalic and atraumatic.   Eyes:      Pupils: Pupils are equal, round, and reactive to light.   Cardiovascular:      Rate and Rhythm: Normal rate and regular rhythm.      Pulses: Normal pulses.      Heart sounds: Normal heart sounds.   Pulmonary:      Effort: Pulmonary effort is normal.      Breath sounds: Normal breath sounds.   Abdominal:      General: Abdomen is flat. Bowel sounds are normal.      Palpations: Abdomen is soft.   Musculoskeletal:         General: Normal range of motion.   Skin:     General: Skin is warm.      Capillary Refill: Capillary refill takes less than 2 seconds.   Neurological:      General: No focal deficit present.      Mental Status: He is alert.             Assessment and Plan:     Primary Problem:  Urinary tract infection  Pyelonephritis  Renal abscess  Panniculitis  Hospital Problem list:    Urinary tract infection    UTI (urinary tract infection)      PMH:  Past Medical History:   Diagnosis Date    Allergic rhinitis     Ankylosis     Cancer     prostate    CVA (cerebral vascular accident)     DDD (degenerative disc disease), lumbar     GERD (gastroesophageal reflux disease)     Hypercholesteremia     Hypertension     Radiculopathy, lumbar region     " Scalp psoriasis     Vertigo        Treatment Plan:  IV antibiotics in the form of Rocephin-await final ID and sensitivity adjust to oral antibiotics tomorrow.    IV fluid resuscitation  IV analgesia  IV antiemetics  Urology consultation  Follow clinical course       Disposition: Home    Reviewed treatment plans with the patient and/or family.   30 minutes spent in face to face interaction and coordination of care.     Electronically signed by Oneil Awad MD on 1/23/2025 at 07:51 CST

## 2025-01-23 NOTE — PLAN OF CARE
Goal Outcome Evaluation:  Plan of Care Reviewed With: patient, spouse        Progress: improving  Outcome Evaluation: Medication ordered for N/V and abd pain this am. Improvement noted. VSS. IV abx given. Up ad jo. NSR on monitor. Poor appetite at this time. Urology consulted.

## 2025-01-24 ENCOUNTER — READMISSION MANAGEMENT (OUTPATIENT)
Dept: CALL CENTER | Facility: HOSPITAL | Age: 75
End: 2025-01-24
Payer: MEDICARE

## 2025-01-24 VITALS
OXYGEN SATURATION: 94 % | HEART RATE: 58 BPM | SYSTOLIC BLOOD PRESSURE: 177 MMHG | TEMPERATURE: 98.5 F | DIASTOLIC BLOOD PRESSURE: 83 MMHG | BODY MASS INDEX: 26.91 KG/M2 | RESPIRATION RATE: 18 BRPM | HEIGHT: 65 IN | WEIGHT: 161.5 LBS

## 2025-01-24 LAB
ALBUMIN SERPL-MCNC: 2.8 G/DL (ref 3.5–5.2)
ALBUMIN/GLOB SERPL: 0.8 G/DL
ALP SERPL-CCNC: 98 U/L (ref 39–117)
ALT SERPL W P-5'-P-CCNC: 33 U/L (ref 1–41)
ANION GAP SERPL CALCULATED.3IONS-SCNC: 13 MMOL/L (ref 5–15)
AST SERPL-CCNC: 31 U/L (ref 1–40)
BILIRUB SERPL-MCNC: 0.3 MG/DL (ref 0–1.2)
BUN SERPL-MCNC: 22 MG/DL (ref 8–23)
BUN/CREAT SERPL: 18.8 (ref 7–25)
CALCIUM SPEC-SCNC: 8.1 MG/DL (ref 8.6–10.5)
CHLORIDE SERPL-SCNC: 104 MMOL/L (ref 98–107)
CO2 SERPL-SCNC: 20 MMOL/L (ref 22–29)
CREAT SERPL-MCNC: 1.17 MG/DL (ref 0.76–1.27)
EGFRCR SERPLBLD CKD-EPI 2021: 66.2 ML/MIN/1.73
GLOBULIN UR ELPH-MCNC: 3.3 GM/DL
GLUCOSE SERPL-MCNC: 112 MG/DL (ref 65–99)
POTASSIUM SERPL-SCNC: 4 MMOL/L (ref 3.5–5.2)
PROT SERPL-MCNC: 6.1 G/DL (ref 6–8.5)
SODIUM SERPL-SCNC: 137 MMOL/L (ref 136–145)

## 2025-01-24 PROCEDURE — 80053 COMPREHEN METABOLIC PANEL: CPT | Performed by: FAMILY MEDICINE

## 2025-01-24 RX ORDER — ONDANSETRON 8 MG/1
8 TABLET, ORALLY DISINTEGRATING ORAL EVERY 8 HOURS PRN
Qty: 30 TABLET | Refills: 1 | Status: SHIPPED | OUTPATIENT
Start: 2025-01-24

## 2025-01-24 RX ORDER — SULFAMETHOXAZOLE AND TRIMETHOPRIM 800; 160 MG/1; MG/1
1 TABLET ORAL 2 TIMES DAILY
Qty: 20 TABLET | Refills: 0 | Status: SHIPPED | OUTPATIENT
Start: 2025-01-24

## 2025-01-24 NOTE — PROGRESS NOTES
[facility]     Progress Note    Patient Name: Laureano Saunders  : 1950  MRN: [id number]  Primary Care Physician:  Oneil Awad MD  Date of admission: 2025    Subjective   Subjective     Chief Complaint: Urinary tract infection    History of Present Illness  Patient Reports able to tolerate p.o.  Patient reports improved abdominal pain.  He denies flank pain or fever    Review of Systems    Objective   Objective     Vitals:   Temp:  [97.8 °F (36.6 °C)-98.3 °F (36.8 °C)] 97.8 °F (36.6 °C)  Heart Rate:  [61-72] 67  Resp:  [16-18] 18  BP: (154-176)/(68-79) 170/79    Physical Exam  Vitals reviewed.   Constitutional:       General: He is not in acute distress.     Appearance: He is well-developed. He is not diaphoretic.   Pulmonary:      Effort: Pulmonary effort is normal.   Abdominal:      General: There is no distension.      Palpations: Abdomen is soft. There is no mass.      Tenderness: There is no abdominal tenderness. There is no guarding or rebound.      Hernia: No hernia is present.   Skin:     General: Skin is warm and dry.   Neurological:      Mental Status: He is alert and oriented to person, place, and time.          Result Review    Result Review:  I have personally reviewed the results from the time of this admission to 2025 07:01 CST and agree with these findings:  []  Laboratory list / accordion  [x]  Microbiology  []  Radiology  []  EKG/Telemetry   []  Cardiology/Vascular   []  Pathology  [x]  Old records  []  Other:  Most notable findings include: Urine culture greater than 100,000 E. coli with resistance only to ampicillin and ampicillin plus sulbactam    Reviewed Dr. Garcia's note indicating transition to p.o. antibiotics and then discharge    Creatinine 1.17      Assessment & Plan   Assessment / Plan     Brief Patient Summary:  Laureano Saunders is a 74 y.o. male who pyelonephritis/prostate cancer    Active Hospital Problems:  Active Hospital Problems    Diagnosis      **Urinary tract infection     UTI (urinary tract infection)      Plan:   Agree with transition to oral antibiotics.  Will plan to reimage patient in 6 weeks with a repeat CT with and without contrast.  Will also check PSA at that time.    VTE Prophylaxis:  No VTE prophylaxis order currently exists.        CODE STATUS:    Level Of Support Discussed With: Patient  Code Status (Patient has no pulse and is not breathing): CPR (Attempt to Resuscitate)  Medical Interventions (Patient has pulse or is breathing): Full Support    Disposition:  I expect patient to be discharged per primary team.    Laureano Matamoros MD

## 2025-01-24 NOTE — PLAN OF CARE
Patient had no c/o pain this shift.   IV antibiotics given per MAR.  Plan for DC tomorrow.  VSS    Problem: Adult Inpatient Plan of Care  Goal: Plan of Care Review  Outcome: Progressing  Goal: Patient-Specific Goal (Individualized)  Outcome: Progressing  Goal: Absence of Hospital-Acquired Illness or Injury  Outcome: Progressing  Intervention: Identify and Manage Fall Risk  Recent Flowsheet Documentation  Taken 1/23/2025 0845 by Radha Diez, RN  Safety Promotion/Fall Prevention: safety round/check completed  Intervention: Prevent Skin Injury  Recent Flowsheet Documentation  Taken 1/23/2025 0845 by Radha Diez, RN  Body Position:   right   side-lying  Goal: Optimal Comfort and Wellbeing  Outcome: Progressing  Goal: Readiness for Transition of Care  Outcome: Progressing     Problem: Comorbidity Management  Goal: Blood Pressure in Desired Range  Outcome: Progressing   Goal Outcome Evaluation:

## 2025-01-24 NOTE — DISCHARGE SUMMARY
Hospital Discharge Summary    Laureano Saunders  :  1950  MRN:  [id number]    Admit date:  2025  Discharge date:  2025    Admitting Physician:    Oneil Awad MD    Discharge Diagnoses:      Urinary tract infection    UTI (urinary tract infection)      Hospital Course:   The patient is a 74 y.o. male who presents with 4-week history of progressively worsening abdominal distention, nausea vomiting and abdominal pain.  Over the preceding 24 to 48 hours developed fever and chills as well.  Upon my evaluation in the office is felt the patient likely has a urinary tract infection and possible intra-abdominal abscess with ileus.  We attempted to directly admit him but unfortunately was told that there was no beds available and we had to send him to the emergency department where he subsequently got a bed.  Subsequent CT showed him to have bilateral pyelonephritis and cortical abscesses on the kidneys.  Urine culture subsequently grew greater than 100,000 colony-forming units of E. coli.  He responded nicely to Rocephin and his ID and sensitivity showed that the bacteria was sensitive to Bactrim he will be switched to this orally.  His pain resolved he defervesced and his nausea vomiting resolved as well to be discharged home with a 10-day course of Bactrim with follow-up with me in my office in 1 week.      The patient was admitted for the above noted medical/surgical issues. Please see daily progress note for further details concerning their stay. The patient improved throughout their stay and reached maximum medical improvement on the day of discharge. The patient/family agree with the treatment plan as outlined above. All questions concerning their stay were answered prior to discharge. They understand the importance of follow up concerning any abnormal test results.     Physical Exam  Vitals and nursing note reviewed.   Constitutional:       Appearance: Normal appearance.   HENT:      Head:  Normocephalic and atraumatic.   Eyes:      Pupils: Pupils are equal, round, and reactive to light.   Cardiovascular:      Rate and Rhythm: Normal rate and regular rhythm.      Pulses: Normal pulses.      Heart sounds: Normal heart sounds.   Pulmonary:      Effort: Pulmonary effort is normal.      Breath sounds: Normal breath sounds.   Abdominal:      General: Abdomen is flat. Bowel sounds are normal.      Palpations: Abdomen is soft.   Musculoskeletal:         General: Normal range of motion.   Skin:     General: Skin is warm.      Capillary Refill: Capillary refill takes less than 2 seconds.   Neurological:      General: No focal deficit present.      Mental Status: He is alert.           Discharge Medications:         Discharge Medications        New Medications        Instructions Start Date   sulfamethoxazole-trimethoprim 800-160 MG per tablet  Commonly known as: Bactrim DS   1 tablet, Oral, 2 Times Daily             Continue These Medications        Instructions Start Date   aspirin 81 MG chewable tablet   81 mg, Daily      diclofenac 75 MG EC tablet  Commonly known as: VOLTAREN   75 mg, Oral, 2 Times Daily      losartan 50 MG tablet  Commonly known as: COZAAR   50 mg, Daily      NIFEdipine XL 90 MG 24 hr tablet  Commonly known as: PROCARDIA XL   90 mg, Daily      omeprazole 40 MG capsule  Commonly known as: priLOSEC   40 mg, Daily      sildenafil 100 MG tablet  Commonly known as: VIAGRA   100 mg, Oral, Daily PRN      simvastatin 40 MG tablet  Commonly known as: ZOCOR   40 mg, Nightly               Activity: As tolerated    Diet: Cardiac    Consults: Dr. Matamoros urology    Significant Diagnostic Studies:    CT Abdomen Pelvis With Contrast    Result Date: 1/21/2025  1. Findings compatible with acute pyelonephritis, possibly with 2 small developing cortical abscesses within the right kidney, each measuring approximately 10 to 11 mm. The left kidney appears within normal limits. Urothelial thickening of the right  renal pelvis and right ureter compatible with UTI. 2. Increased attenuation of mesenteric fat planes compatible with mesenteric panniculitis. 3. Normal appendix.    This report was signed and finalized on 1/21/2025 2:57 PM by Dr. Oneil Small MD.            Treatments:   As above    Disposition:   Home    Time spent on discharge including discussion with patient/family, SW, and coordination of care.     Follow up with Oneil Awad MD in 1 week    Signed:  Oneil Awad MD   1/24/2025, 08:59 CST

## 2025-01-24 NOTE — PLAN OF CARE
Goal Outcome Evaluation:      No changes overnight. VSS. No c/o pain. Safety maintained.      Problem: Adult Inpatient Plan of Care  Goal: Plan of Care Review  Outcome: Progressing  Goal: Patient-Specific Goal (Individualized)  Outcome: Progressing  Goal: Absence of Hospital-Acquired Illness or Injury  Outcome: Progressing  Intervention: Identify and Manage Fall Risk  Recent Flowsheet Documentation  Taken 1/24/2025 0000 by Crystal Panchal RN  Safety Promotion/Fall Prevention: safety round/check completed  Taken 1/23/2025 2200 by Crystal Panchal RN  Safety Promotion/Fall Prevention: safety round/check completed  Taken 1/23/2025 2100 by Crystal Panchal RN  Safety Promotion/Fall Prevention: safety round/check completed  Taken 1/23/2025 1953 by Crystal Panchal RN  Safety Promotion/Fall Prevention: safety round/check completed  Intervention: Prevent Skin Injury  Recent Flowsheet Documentation  Taken 1/23/2025 1953 by Crystal Panchal RN  Body Position: position changed independently  Goal: Optimal Comfort and Wellbeing  Outcome: Progressing  Intervention: Provide Person-Centered Care  Recent Flowsheet Documentation  Taken 1/23/2025 1953 by Crystal Panchal RN  Trust Relationship/Rapport:   care explained   choices provided   questions answered   questions encouraged   reassurance provided  Goal: Readiness for Transition of Care  Outcome: Progressing

## 2025-01-25 NOTE — OUTREACH NOTE
Prep Survey      Flowsheet Row Responses   [facility] patient discharged from? [place]   Is LACE score < 7 ? No   Eligibility Readm Mgmt   Discharge diagnosis Urinary tract infection    UTI (urinary tract infection)   Does the patient have one of the following disease processes/diagnoses(primary or secondary)? Other   Does the patient have Home health ordered? No   Is there a DME ordered? No   Prep survey completed? Yes            MIRIAN MURPHY - Registered Nurse

## 2025-01-29 ENCOUNTER — READMISSION MANAGEMENT (OUTPATIENT)
Dept: CALL CENTER | Facility: HOSPITAL | Age: 75
End: 2025-01-29
Payer: MEDICARE

## 2025-01-29 NOTE — OUTREACH NOTE
Medical Week 1 Survey      Flowsheet Row Responses   [facility] patient discharged from? [place]   Does the patient have one of the following disease processes/diagnoses(primary or secondary)? Other   Week 1 attempt successful? Yes   Call start time 1003   Call end time 1007   Meds reviewed with patient/caregiver? Yes   Is the patient having any side effects they believe may be caused by any medication additions or changes? No   Does the patient have all medications ordered at discharge? Yes   Is the patient taking all medications as directed (includes completed medication regime)? Yes   Comments regarding appointments PCP appt 01/30/25. Urology appt 03/10/25.   Does the patient have a primary care provider?  Yes   Does the patient have an appointment with their PCP within 7 days of discharge? Yes   Has the patient kept scheduled appointments due by today? N/A   Has home health visited the patient within 72 hours of discharge? N/A   Psychosocial issues? No   Did the patient receive a copy of their discharge instructions? Yes   Nursing interventions Reviewed instructions with patient   What is the patient's perception of their health status since discharge? Improving   Is the patient/caregiver able to teach back signs and symptoms related to disease process for when to call PCP? Yes   Is the patient/caregiver able to teach back signs and symptoms related to disease process for when to call 911? Yes   Is the patient/caregiver able to teach back the hierarchy of who to call/visit for symptoms/problems? PCP, Specialist, Home health nurse, Urgent Care, ED, 911 Yes   If the patient is a current smoker, are they able to teach back resources for cessation? Not a smoker   Week 1 call completed? Yes   Graduated Yes   Would this patient benefit from a Referral to Amb Social Work? No   Is the patient interested in additional calls from an ambulatory ? No   Wrap up additional comments Patient states is  improving. Denies any UTI s/s. Will see his PCP tomorrow. Denies any needs or concerns today.   Call end time 1007            Fanta GALDAMEZ - Registered Nurse

## 2025-02-04 LAB
QT INTERVAL: 396 MS
QTC INTERVAL: 448 MS

## 2025-02-26 NOTE — PROGRESS NOTES
Subjective    Mr. Saunders is 74 y.o. male    Chief Complaint: Pyelonephritis/ Prostate Cancer     History of Present Illness  Patient is status post robotic assisted lap prostatectomy 12/3/2024.  Final pathology pT2 pN0 Joyce 3+3 equal 6 surgically margin negative adenocarcinoma the prostate. 5ppd NESTOR.  Patient on Viagra for ED. patient was admitted to the hospital with pyelonephritis urinary tract infection and discharged on January 24, 2025.AUA 20/35 with incomplete emptying, frequency, intermittency, urgency, weak stream, straining, nocturia.    Lab Results   Component Value Date    PSA <0.014 03/07/2025    PSA 0.015 01/09/2025       The following portions of the patient's history were reviewed and updated as appropriate: allergies, current medications, past family history, past medical history, past social history, past surgical history and problem list.    Review of Systems      Current Outpatient Medications:     aspirin 81 MG chewable tablet, Chew 1 tablet Daily., Disp: , Rfl:     diclofenac (VOLTAREN) 75 MG EC tablet, Take 1 tablet by mouth 2 (Two) Times a Day., Disp: , Rfl:     losartan (COZAAR) 50 MG tablet, Take 1 tablet by mouth Daily., Disp: , Rfl:     NIFEdipine XL (PROCARDIA XL) 90 MG 24 hr tablet, Take 1 tablet by mouth Daily., Disp: , Rfl:     omeprazole (PriLOSEC) 40 MG capsule, Take 1 capsule by mouth Daily., Disp: , Rfl:     ondansetron ODT (ZOFRAN-ODT) 8 MG disintegrating tablet, Place 1 tablet on the tongue Every 8 (Eight) Hours As Needed for Nausea or Vomiting., Disp: 30 tablet, Rfl: 1    sildenafil (VIAGRA) 100 MG tablet, Take 1 tablet by mouth Daily As Needed for Erectile Dysfunction., Disp: , Rfl:     simvastatin (ZOCOR) 40 MG tablet, Take 1 tablet by mouth Every Night., Disp: , Rfl:     sulfamethoxazole-trimethoprim (Bactrim DS) 800-160 MG per tablet, Take 1 tablet by mouth 2 (Two) Times a Day., Disp: 20 tablet, Rfl: 0    Past Medical History:   Diagnosis Date    Allergic rhinitis      Ankylosis     Cancer     prostate    CVA (cerebral vascular accident)     DDD (degenerative disc disease), lumbar     GERD (gastroesophageal reflux disease)     Hypercholesteremia     Hypertension     Radiculopathy, lumbar region     Scalp psoriasis     Vertigo        Past Surgical History:   Procedure Laterality Date    COLONOSCOPY  09/01/2011    normal    COLONOSCOPY N/A 8/27/2019    Procedure: COLONOSCOPY WITH ANESTHESIA;  Surgeon: Reuben Gruber DO;  Location: [facility] ENDOSCOPY;  Service: Gastroenterology    COLONOSCOPY N/A 9/4/2024    Procedure: COLONOSCOPY WITH ANESTHESIA;  Surgeon: Reuben Gruber DO;  Location: [facility] ENDOSCOPY;  Service: Gastroenterology;  Laterality: N/A;  pre: family hx colon cancer  post: diverticulosis  [place]    ENDOSCOPY  06/19/2015    stable dos santos esophagus    ENDOSCOPY N/A 8/29/2019    Procedure: ESOPHAGOGASTRODUODENOSCOPY WITH ANESTHESIA;  Surgeon: Reuben Gruber DO;  Location: [facility] ENDOSCOPY;  Service: Gastroenterology    ENDOSCOPY N/A 9/26/2022    Procedure: ESOPHAGOGASTRODUODENOSCOPY WITH ANESTHESIA;  Surgeon: Reuben Gruber DO;  Location: [facility] ENDOSCOPY;  Service: Gastroenterology;  Laterality: N/A;  preop; GERD  postop; r/o barretts   PCP [facility]     EYE SURGERY      KNEE ARTHROSCOPY      right knee x 2    NECK SURGERY      PROSTATECTOMY N/A 12/3/2024    Procedure: RADICAL PROSTATECTOMY LAPAROSCOPIC WITH DAVINCI ROBOT;  Surgeon: Laureano Matamoros MD;  Location: [facility] OR;  Service: Robotics - DaVinci;  Laterality: N/A;    ROTATOR CUFF REPAIR         Social History     Socioeconomic History    Marital status:    Tobacco Use    Smoking status: Former     Types: Cigarettes     Passive exposure: Past    Smokeless tobacco: Current     Types: Chew   Vaping Use    Vaping status: Never Used   Substance and Sexual Activity    Alcohol use: No    Drug use: No    Sexual activity: Defer       Family History   Problem Relation Age of Onset    Heart  "disease Mother     Hypertension Mother     Colon cancer Father     Hypertension Father     Heart disease Father     Cancer Father     Hypertension Sister     Diabetes Sister     Hypertension Brother     Diabetes Paternal Grandmother     Colon polyps Neg Hx     Esophageal cancer Neg Hx        Objective    Temp 97.5 °F (36.4 °C)   Ht 165.1 cm (65\")   Wt 76 kg (167 lb 9.6 oz)   BMI 27.89 kg/m²     Physical Exam        Results for orders placed or performed in visit on 03/10/25   POC Urinalysis Dipstick, Multipro    Collection Time: 03/10/25  9:50 AM    Specimen: Urine   Result Value Ref Range    Color Yellow Yellow, Straw, Dark Yellow, Sussy    Clarity, UA Clear Clear    Glucose, UA Negative Negative mg/dL    Bilirubin Small (1+) (A) Negative    Ketones, UA Trace (A) Negative    Specific Gravity  1.025 1.005 - 1.030    Blood, UA Negative Negative    pH, Urine 6.0 5.0 - 8.0    Protein, POC 30 mg/dL (A) Negative mg/dL    Urobilinogen, UA 0.2 E.U./dL Normal, 0.2 E.U./dL    Nitrite, UA Negative Negative    Leukocytes Negative Negative   IPSS Questionnaire (AUA-7):  Incomplete emptying  Over the past month, how often have you had a sensation of not emptying your bladder completely after you finished urinating?: Almost always (03/10/25 0946)  Frequency  Over the past month, how often have you had to urinate again less than two hours after you finishied urinating ?: Almost always (03/10/25 0946)  Intermittency  Over the past month, how often have you found you stopped and started again several time when you urinated ?: Less than 1 time in 5 (03/10/25 0946)  Urgency  Over the last month, how often have you found it difficult  have you found it difficult to postpone urination ?: Less than half the time (03/10/25 0946)  Weak Stream  Over the past month, how often have you had a weak urinary stream ?: Less than 1 time in 5 (03/10/25 0946)  Straining  Over the past month, how often have you had to push or strain to begin " urination ?: Less than 1 time in 5 (03/10/25 0946)  Nocturia  Over the past month, how many times did you most typically get up to urinate from the time you went to bed until the time you got up in the morning ?: At least 5 times (03/10/25 0946)  Quality of life due to urinary symptoms  If you were to spend the rest of your life with your urinary condition the way it is now, how would feel about that?: Terrible (03/10/25 0946)    Scores  Total IPSS Score: (!) 20 (03/10/25 0946)  Total Score = Symptomatic Level: Severely symptomatic: 20-35 (03/10/25 0946)        Assessment and Plan    Diagnoses and all orders for this visit:    1. Prostate CA (Primary)  -     POC Urinalysis Dipstick, Multipro    2. Lower urinary tract symptoms (LUTS)    3. Male stress incontinence    4. Impotence of organic origin         Patient status post robotic assisted lap prostatectomy 12/3/2024.  Final pathology pT2 N0 Joyce 3+3 equal 6 adenocarcinoma prostate with negative surgical margins. PSA undetectable.     Regarding his continence I did discuss referral to physical therapy he does not want to do that.  I will continue Kegel's exercises.  I did discuss with him that he is not at the point where his incontinence is improving in 6 months we will explore potential surgical options.     Continue Viagra for ED and penile rehabilitation.    Recheck PSA in six months.

## 2025-03-03 ENCOUNTER — TELEPHONE (OUTPATIENT)
Dept: UROLOGY | Facility: CLINIC | Age: 75
End: 2025-03-03
Payer: MEDICARE

## 2025-03-03 NOTE — TELEPHONE ENCOUNTER
Called pt and let him know that Dr. Awad did a CT without contrast. We ordered a CT with and without contrast, he v/u that he needs to complete this.

## 2025-03-03 NOTE — TELEPHONE ENCOUNTER
Caller: Laureano Saunders    Relationship to patient: Self    Best call back number: [phone] (home)       Patient is needing: IS THE CT 2.7.25 (IN CHART) ACCEPTABLE BY DR PATE? OR DO I NEED TO HAVE THE CT SCHED 3.7.25 COMPLETED?       MY INSURANCE IS DENYING THE 3.7.25 APPT BECAUSE MY PCP DR BREWSTER HAD A CT COMPLETED 2.7.25. I WOULD LIKE TO KNOW WHAT I NEED TO DO. THANK YOU.

## 2025-03-05 ENCOUNTER — TELEPHONE (OUTPATIENT)
Dept: UROLOGY | Facility: CLINIC | Age: 75
End: 2025-03-05
Payer: MEDICARE

## 2025-03-07 ENCOUNTER — LAB (OUTPATIENT)
Dept: LAB | Facility: HOSPITAL | Age: 75
End: 2025-03-07
Payer: MEDICARE

## 2025-03-07 ENCOUNTER — HOSPITAL ENCOUNTER (OUTPATIENT)
Dept: CT IMAGING | Facility: HOSPITAL | Age: 75
Discharge: HOME | End: 2025-03-07
Payer: MEDICARE

## 2025-03-07 DIAGNOSIS — C61 PROSTATE CA: ICD-10-CM

## 2025-03-07 DIAGNOSIS — N39.0 URINARY TRACT INFECTION WITHOUT HEMATURIA, SITE UNSPECIFIED: ICD-10-CM

## 2025-03-07 LAB — PSA SERPL-MCNC: <0.014 NG/ML (ref 0–4)

## 2025-03-07 PROCEDURE — 74178 CT ABD&PLV WO CNTR FLWD CNTR: CPT

## 2025-03-07 PROCEDURE — 84153 ASSAY OF PSA TOTAL: CPT

## 2025-03-07 PROCEDURE — 36415 COLL VENOUS BLD VENIPUNCTURE: CPT

## 2025-03-07 PROCEDURE — 25510000001 IOPAMIDOL PER 1 ML: Performed by: UROLOGY

## 2025-03-07 RX ORDER — IOPAMIDOL 755 MG/ML
100 INJECTION, SOLUTION INTRAVASCULAR
Status: COMPLETED | OUTPATIENT
Start: 2025-03-07 | End: 2025-03-07

## 2025-03-07 RX ADMIN — IOPAMIDOL 100 ML: 755 INJECTION, SOLUTION INTRAVENOUS at 08:31

## 2025-03-10 ENCOUNTER — OFFICE VISIT (OUTPATIENT)
Dept: UROLOGY | Facility: CLINIC | Age: 75
End: 2025-03-10
Payer: MEDICARE

## 2025-03-10 VITALS — HEIGHT: 65 IN | WEIGHT: 167.6 LBS | TEMPERATURE: 97.5 F | BODY MASS INDEX: 27.92 KG/M2

## 2025-03-10 DIAGNOSIS — C61 PROSTATE CA: Primary | ICD-10-CM

## 2025-03-10 DIAGNOSIS — N52.9 IMPOTENCE OF ORGANIC ORIGIN: ICD-10-CM

## 2025-03-10 DIAGNOSIS — R39.9 LOWER URINARY TRACT SYMPTOMS (LUTS): ICD-10-CM

## 2025-03-10 DIAGNOSIS — N39.3 MALE STRESS INCONTINENCE: ICD-10-CM

## 2025-03-10 LAB
BILIRUB BLD-MCNC: ABNORMAL MG/DL
CLARITY, POC: CLEAR
COLOR UR: YELLOW
GLUCOSE UR STRIP-MCNC: NEGATIVE MG/DL
KETONES UR QL: ABNORMAL
LEUKOCYTE EST, POC: NEGATIVE
NITRITE UR-MCNC: NEGATIVE MG/ML
PH UR: 6 [PH] (ref 5–8)
PROT UR STRIP-MCNC: ABNORMAL MG/DL
RBC # UR STRIP: NEGATIVE /UL
SP GR UR: 1.02 (ref 1–1.03)
UROBILINOGEN UR QL: ABNORMAL

## 2025-03-10 PROCEDURE — 1160F RVW MEDS BY RX/DR IN RCRD: CPT | Performed by: UROLOGY

## 2025-03-10 PROCEDURE — 99213 OFFICE O/P EST LOW 20 MIN: CPT | Performed by: UROLOGY

## 2025-03-10 PROCEDURE — 1159F MED LIST DOCD IN RCRD: CPT | Performed by: UROLOGY

## 2025-03-10 PROCEDURE — 81003 URINALYSIS AUTO W/O SCOPE: CPT | Performed by: UROLOGY

## 2025-06-10 ENCOUNTER — TRANSCRIBE ORDERS (OUTPATIENT)
Dept: ADMINISTRATIVE | Facility: HOSPITAL | Age: 75
End: 2025-06-10
Payer: MEDICARE

## 2025-06-10 DIAGNOSIS — R10.9 LEFT FLANK PAIN: Primary | ICD-10-CM

## 2025-07-03 ENCOUNTER — HOSPITAL ENCOUNTER (OUTPATIENT)
Dept: CT IMAGING | Facility: HOSPITAL | Age: 75
Discharge: HOME | End: 2025-07-03
Admitting: FAMILY MEDICINE
Payer: MEDICARE

## 2025-07-03 DIAGNOSIS — R10.9 LEFT FLANK PAIN: ICD-10-CM

## 2025-07-03 PROCEDURE — 74176 CT ABD & PELVIS W/O CONTRAST: CPT

## (undated) DEVICE — SUT VIC 0 UR6 27IN VCP603H

## (undated) DEVICE — THE CHANNEL CLEANING BRUSH IS A NYLON FLEXI BRUSH ATTACHED TO A FLEXIBLE PLASTIC SHEATH DESIGNED TO SAFELY REMOVE DEBRIS FROM FLEXIBLE ENDOSCOPES.

## (undated) DEVICE — CONMED SCOPE SAVER BITE BLOCK, 20X27 MM: Brand: SCOPE SAVER

## (undated) DEVICE — RESERVOIR,SUCTION,100CC,SILICONE: Brand: MEDLINE

## (undated) DEVICE — SEAL

## (undated) DEVICE — SYS CLOSE PORTII CARTR/THOMASN XL

## (undated) DEVICE — VIOLET POLYDIOXANONE POLYMER, SYNTHETIC ABSORBABLE SUTURE CLIPS: Brand: LAPRA-TY

## (undated) DEVICE — ENDOPOUCH RETRIEVER SPECIMEN RETRIEVAL BAGS: Brand: ENDOPOUCH RETRIEVER

## (undated) DEVICE — SENSR O2 OXIMAX FNGR A/ 18IN NONSTR

## (undated) DEVICE — GLV SURG BIOGEL M LTX PF 7 1/2

## (undated) DEVICE — TROC BLADLES ANCHORPORT/OPTI LP 12X120MM 1P/U

## (undated) DEVICE — ENDOPATH XCEL WITH OPTIVIEW TECHNOLOGY BLADELESS TROCARS WITH STABILITY SLEEVES: Brand: ENDOPATH XCEL OPTIVIEW

## (undated) DEVICE — YANKAUER,BULB TIP WITH VENT: Brand: ARGYLE

## (undated) DEVICE — DAVINCI: Brand: MEDLINE INDUSTRIES, INC.

## (undated) DEVICE — TRAP FLD MINIVAC MEGADYNE 100ML

## (undated) DEVICE — TBG SMPL FLTR LINE NASL 02/C02 A/ BX/100

## (undated) DEVICE — MONOPOLAR METZENBAUM SCISSOR, MINI BLADE TIP, DISPOSABLE: Brand: MONOPOLAR METZENBAUM SCISSOR, MINI BLADE TIP, DISPOSABLE

## (undated) DEVICE — Device: Brand: DEFENDO AIR/WATER/SUCTION AND BIOPSY VALVE

## (undated) DEVICE — GLV SURG BIOGEL M LTX PF 8

## (undated) DEVICE — FRCP BX RADJAW4 NDL 2.8 240 STD OG

## (undated) DEVICE — CATHETER,FOLEY,SILI-ELAST,LTX,20FR,10ML: Brand: MEDLINE

## (undated) DEVICE — SNAR POLYP CAPTIVATOR MICROHEX 13 240CM

## (undated) DEVICE — MASK,OXYGEN,MED CONC,ADLT,7' TUB, UC: Brand: PENDING

## (undated) DEVICE — THE SINGLE USE ETRAP – POLYP TRAP IS USED FOR SUCTION RETRIEVAL OF ENDOSCOPICALLY REMOVED POLYPS.: Brand: ETRAP

## (undated) DEVICE — KT CLN CLEANOR SCPE

## (undated) DEVICE — CUFF,BP,DISP,1 TUBE,ADULT,HP: Brand: MEDLINE

## (undated) DEVICE — APPL HEMO ENDO SURGICEL 2IN1 1P/U STRL

## (undated) DEVICE — ENDOGATOR AUXILIARY WATER JET CONNECTOR: Brand: ENDOGATOR

## (undated) DEVICE — SUT VIC 0 SUTUPAK TIES 18IN J906G

## (undated) DEVICE — DRN JP RND NO TROC SIL 15F 3/16IN

## (undated) DEVICE — TIP COVER ACCESSORY

## (undated) DEVICE — ST TBG AIRSEAL FLTR TRI LUM

## (undated) DEVICE — SUT SILK 2/0 FS BLK 18IN 685G

## (undated) DEVICE — ANTIBACTERIAL UNDYED BRAIDED (POLYGLACTIN 910), SYNTHETIC ABSORBABLE SUTURE: Brand: COATED VICRYL

## (undated) DEVICE — SUT GUT CHRM 3/0 SH 27IN G122H

## (undated) DEVICE — BLADELESS OBTURATOR: Brand: WECK VISTA

## (undated) DEVICE — CODMAN® SURGICAL STRIPS 1" X 6" (25MM X 152MM): Brand: CODMAN®

## (undated) DEVICE — ADHS SKIN PREMIERPRO EXOFIN TOPICAL HI/VISC .5ML

## (undated) DEVICE — ARM DRAPE

## (undated) DEVICE — PATIENT RETURN ELECTRODE, SINGLE-USE, CONTACT QUALITY MONITORING, ADULT, WITH 9FT CORD, FOR PATIENTS WEIGING OVER 33LBS. (15KG): Brand: MEGADYNE

## (undated) DEVICE — DRSNG WND SIL OPTIFOAM GENTLE BRDR ADHS W/SA 4X4IN

## (undated) DEVICE — PK POSTN TRENGUARD450 PROC

## (undated) DEVICE — 4-PORT MANIFOLD: Brand: NEPTUNE 2